# Patient Record
Sex: FEMALE | Race: WHITE | NOT HISPANIC OR LATINO | Employment: OTHER | URBAN - METROPOLITAN AREA
[De-identification: names, ages, dates, MRNs, and addresses within clinical notes are randomized per-mention and may not be internally consistent; named-entity substitution may affect disease eponyms.]

---

## 2017-01-17 ENCOUNTER — ALLSCRIPTS OFFICE VISIT (OUTPATIENT)
Dept: OTHER | Facility: OTHER | Age: 57
End: 2017-01-17

## 2017-01-17 ENCOUNTER — TRANSCRIBE ORDERS (OUTPATIENT)
Dept: ADMINISTRATIVE | Facility: HOSPITAL | Age: 57
End: 2017-01-17

## 2017-01-17 DIAGNOSIS — Z12.31 ENCOUNTER FOR SCREENING MAMMOGRAM FOR MALIGNANT NEOPLASM OF BREAST: ICD-10-CM

## 2017-01-17 DIAGNOSIS — Z12.39 OTHER SCREENING BREAST EXAMINATION: Primary | ICD-10-CM

## 2017-01-27 ENCOUNTER — GENERIC CONVERSION - ENCOUNTER (OUTPATIENT)
Dept: OTHER | Facility: OTHER | Age: 57
End: 2017-01-27

## 2017-01-30 ENCOUNTER — GENERIC CONVERSION - ENCOUNTER (OUTPATIENT)
Dept: OTHER | Facility: OTHER | Age: 57
End: 2017-01-30

## 2017-02-01 ENCOUNTER — ANESTHESIA EVENT (OUTPATIENT)
Dept: GASTROENTEROLOGY | Facility: HOSPITAL | Age: 57
End: 2017-02-01

## 2017-02-02 ENCOUNTER — ANESTHESIA (OUTPATIENT)
Dept: GASTROENTEROLOGY | Facility: HOSPITAL | Age: 57
End: 2017-02-02

## 2017-03-06 ENCOUNTER — ANESTHESIA EVENT (OUTPATIENT)
Dept: GASTROENTEROLOGY | Facility: HOSPITAL | Age: 57
End: 2017-03-06
Payer: COMMERCIAL

## 2017-03-07 ENCOUNTER — ANESTHESIA (OUTPATIENT)
Dept: GASTROENTEROLOGY | Facility: HOSPITAL | Age: 57
End: 2017-03-07
Payer: COMMERCIAL

## 2017-04-11 ENCOUNTER — HOSPITAL ENCOUNTER (OUTPATIENT)
Facility: HOSPITAL | Age: 57
Setting detail: OUTPATIENT SURGERY
Discharge: HOME | End: 2017-04-11
Attending: INTERNAL MEDICINE | Admitting: INTERNAL MEDICINE
Payer: COMMERCIAL

## 2017-04-11 VITALS
TEMPERATURE: 96.7 F | OXYGEN SATURATION: 98 % | WEIGHT: 220 LBS | HEIGHT: 62 IN | HEART RATE: 59 BPM | SYSTOLIC BLOOD PRESSURE: 125 MMHG | RESPIRATION RATE: 18 BRPM | DIASTOLIC BLOOD PRESSURE: 76 MMHG | BODY MASS INDEX: 40.48 KG/M2

## 2017-04-11 PROCEDURE — C1726 CATH, BAL DIL, NON-VASCULAR: HCPCS | Performed by: INTERNAL MEDICINE

## 2017-04-11 RX ORDER — IBUPROFEN 200 MG
200 TABLET ORAL EVERY 6 HOURS PRN
COMMUNITY
End: 2017-07-28 | Stop reason: ALTCHOICE

## 2017-04-11 RX ORDER — SODIUM CHLORIDE 9 MG/ML
125 INJECTION, SOLUTION INTRAVENOUS CONTINUOUS
Status: DISCONTINUED | OUTPATIENT
Start: 2017-04-11 | End: 2017-04-11 | Stop reason: HOSPADM

## 2017-04-11 RX ORDER — PROPOFOL 10 MG/ML
INJECTION, EMULSION INTRAVENOUS AS NEEDED
Status: DISCONTINUED | OUTPATIENT
Start: 2017-04-11 | End: 2017-04-11 | Stop reason: SURG

## 2017-04-11 RX ADMIN — PROPOFOL 120 MG: 10 INJECTION, EMULSION INTRAVENOUS at 14:04

## 2017-04-11 RX ADMIN — PROPOFOL 30 MG: 10 INJECTION, EMULSION INTRAVENOUS at 14:19

## 2017-04-11 RX ADMIN — PROPOFOL 40 MG: 10 INJECTION, EMULSION INTRAVENOUS at 14:12

## 2017-04-11 RX ADMIN — PROPOFOL 40 MG: 10 INJECTION, EMULSION INTRAVENOUS at 14:15

## 2017-04-11 RX ADMIN — SODIUM CHLORIDE 125 ML/HR: 0.9 INJECTION, SOLUTION INTRAVENOUS at 13:08

## 2017-04-19 ENCOUNTER — ALLSCRIPTS OFFICE VISIT (OUTPATIENT)
Dept: OTHER | Facility: OTHER | Age: 57
End: 2017-04-19

## 2017-04-19 DIAGNOSIS — M79.641 PAIN OF RIGHT HAND: ICD-10-CM

## 2017-04-19 DIAGNOSIS — D50.9 IRON DEFICIENCY ANEMIA: ICD-10-CM

## 2017-04-19 DIAGNOSIS — E03.9 HYPOTHYROIDISM: ICD-10-CM

## 2017-04-19 DIAGNOSIS — E78.5 HYPERLIPIDEMIA: ICD-10-CM

## 2017-05-17 ENCOUNTER — ALLSCRIPTS OFFICE VISIT (OUTPATIENT)
Dept: OTHER | Facility: OTHER | Age: 57
End: 2017-05-17

## 2017-06-05 ENCOUNTER — HOSPITAL ENCOUNTER (EMERGENCY)
Facility: HOSPITAL | Age: 57
Discharge: HOME | End: 2017-06-05
Attending: EMERGENCY MEDICINE | Admitting: EMERGENCY MEDICINE
Payer: COMMERCIAL

## 2017-06-05 ENCOUNTER — APPOINTMENT (EMERGENCY)
Dept: CT IMAGING | Facility: HOSPITAL | Age: 57
End: 2017-06-05
Payer: COMMERCIAL

## 2017-06-05 VITALS
RESPIRATION RATE: 18 BRPM | SYSTOLIC BLOOD PRESSURE: 122 MMHG | OXYGEN SATURATION: 97 % | HEART RATE: 64 BPM | DIASTOLIC BLOOD PRESSURE: 60 MMHG | TEMPERATURE: 97.8 F

## 2017-06-05 DIAGNOSIS — S23.9XXA THORACIC BACK SPRAIN, INITIAL ENCOUNTER: Primary | ICD-10-CM

## 2017-06-05 DIAGNOSIS — R10.9 RIGHT FLANK DISCOMFORT: ICD-10-CM

## 2017-06-05 LAB
ALBUMIN SERPL BCP-MCNC: 3.1 G/DL (ref 3.5–5)
ALP SERPL-CCNC: 98 U/L (ref 46–116)
ALT SERPL W P-5'-P-CCNC: 13 U/L (ref 12–78)
ANION GAP SERPL CALCULATED.3IONS-SCNC: 2 MMOL/L (ref 4–13)
AST SERPL W P-5'-P-CCNC: 11 U/L (ref 5–45)
BACTERIA UR QL AUTO: ABNORMAL /HPF
BASOPHILS # BLD AUTO: 0.04 THOUSANDS/ΜL (ref 0–0.1)
BASOPHILS NFR BLD AUTO: 0 % (ref 0–1)
BILIRUB SERPL-MCNC: 0.24 MG/DL (ref 0.2–1)
BILIRUB UR QL STRIP: NEGATIVE
BUN SERPL-MCNC: 18 MG/DL (ref 5–25)
CALCIUM SERPL-MCNC: 9 MG/DL (ref 8.3–10.1)
CHLORIDE SERPL-SCNC: 107 MMOL/L (ref 100–108)
CLARITY UR: CLEAR
CO2 SERPL-SCNC: 32 MMOL/L (ref 21–32)
COLOR UR: YELLOW
CREAT SERPL-MCNC: 0.94 MG/DL (ref 0.6–1.3)
EOSINOPHIL # BLD AUTO: 0.27 THOUSAND/ΜL (ref 0–0.61)
EOSINOPHIL NFR BLD AUTO: 3 % (ref 0–6)
ERYTHROCYTE [DISTWIDTH] IN BLOOD BY AUTOMATED COUNT: 13.6 % (ref 11.6–15.1)
GFR SERPL CREATININE-BSD FRML MDRD: >60 ML/MIN/1.73SQ M
GLUCOSE SERPL-MCNC: 97 MG/DL (ref 65–140)
GLUCOSE UR STRIP-MCNC: NEGATIVE MG/DL
HCT VFR BLD AUTO: 34.3 % (ref 34.8–46.1)
HGB BLD-MCNC: 11.2 G/DL (ref 11.5–15.4)
HGB UR QL STRIP.AUTO: NEGATIVE
KETONES UR STRIP-MCNC: NEGATIVE MG/DL
LEUKOCYTE ESTERASE UR QL STRIP: ABNORMAL
LYMPHOCYTES # BLD AUTO: 2.82 THOUSANDS/ΜL (ref 0.6–4.47)
LYMPHOCYTES NFR BLD AUTO: 31 % (ref 14–44)
MCH RBC QN AUTO: 28.6 PG (ref 26.8–34.3)
MCHC RBC AUTO-ENTMCNC: 32.7 G/DL (ref 31.4–37.4)
MCV RBC AUTO: 88 FL (ref 82–98)
MONOCYTES # BLD AUTO: 0.67 THOUSAND/ΜL (ref 0.17–1.22)
MONOCYTES NFR BLD AUTO: 8 % (ref 4–12)
NEUTROPHILS # BLD AUTO: 5.18 THOUSANDS/ΜL (ref 1.85–7.62)
NEUTS SEG NFR BLD AUTO: 58 % (ref 43–75)
NITRITE UR QL STRIP: NEGATIVE
NON-SQ EPI CELLS URNS QL MICRO: ABNORMAL /HPF
NRBC BLD AUTO-RTO: 0 /100 WBCS
PH UR STRIP.AUTO: 6 [PH] (ref 4.5–8)
PLATELET # BLD AUTO: 287 THOUSANDS/UL (ref 149–390)
PMV BLD AUTO: 9.2 FL (ref 8.9–12.7)
POTASSIUM SERPL-SCNC: 4.5 MMOL/L (ref 3.5–5.3)
PROT SERPL-MCNC: 6.7 G/DL (ref 6.4–8.2)
PROT UR STRIP-MCNC: NEGATIVE MG/DL
RBC # BLD AUTO: 3.91 MILLION/UL (ref 3.81–5.12)
RBC #/AREA URNS AUTO: ABNORMAL /HPF
SODIUM SERPL-SCNC: 141 MMOL/L (ref 136–145)
SP GR UR STRIP.AUTO: <=1.005 (ref 1–1.03)
UROBILINOGEN UR QL STRIP.AUTO: 0.2 E.U./DL
WBC # BLD AUTO: 8.98 THOUSAND/UL (ref 4.31–10.16)
WBC #/AREA URNS AUTO: ABNORMAL /HPF

## 2017-06-05 PROCEDURE — 80053 COMPREHEN METABOLIC PANEL: CPT | Performed by: EMERGENCY MEDICINE

## 2017-06-05 PROCEDURE — 81002 URINALYSIS NONAUTO W/O SCOPE: CPT | Performed by: EMERGENCY MEDICINE

## 2017-06-05 PROCEDURE — 99284 EMERGENCY DEPT VISIT MOD MDM: CPT

## 2017-06-05 PROCEDURE — 85025 COMPLETE CBC W/AUTO DIFF WBC: CPT | Performed by: EMERGENCY MEDICINE

## 2017-06-05 PROCEDURE — 36415 COLL VENOUS BLD VENIPUNCTURE: CPT | Performed by: EMERGENCY MEDICINE

## 2017-06-05 PROCEDURE — 81001 URINALYSIS AUTO W/SCOPE: CPT

## 2017-06-05 PROCEDURE — 74176 CT ABD & PELVIS W/O CONTRAST: CPT

## 2017-06-05 RX ORDER — LIDOCAINE 50 MG/G
1 PATCH TOPICAL EVERY 24 HOURS
Qty: 30 PATCH | Refills: 0 | Status: SHIPPED | OUTPATIENT
Start: 2017-06-05 | End: 2017-07-28 | Stop reason: ALTCHOICE

## 2017-06-05 RX ORDER — TRAMADOL HYDROCHLORIDE 50 MG/1
50 TABLET ORAL EVERY 6 HOURS PRN
Qty: 20 TABLET | Refills: 0 | Status: SHIPPED | OUTPATIENT
Start: 2017-06-05 | End: 2017-06-10

## 2017-06-05 RX ORDER — LIDOCAINE 50 MG/G
1 PATCH TOPICAL ONCE
Status: DISCONTINUED | OUTPATIENT
Start: 2017-06-05 | End: 2017-06-06 | Stop reason: HOSPADM

## 2017-06-05 RX ORDER — METHOCARBAMOL 500 MG/1
1000 TABLET, FILM COATED ORAL ONCE
Status: COMPLETED | OUTPATIENT
Start: 2017-06-05 | End: 2017-06-05

## 2017-06-05 RX ORDER — IBUPROFEN 600 MG/1
600 TABLET ORAL EVERY 8 HOURS PRN
Qty: 30 TABLET | Refills: 0 | Status: SHIPPED | OUTPATIENT
Start: 2017-06-05 | End: 2017-07-28 | Stop reason: ALTCHOICE

## 2017-06-05 RX ORDER — TRAMADOL HYDROCHLORIDE 50 MG/1
50 TABLET ORAL ONCE
Status: COMPLETED | OUTPATIENT
Start: 2017-06-05 | End: 2017-06-05

## 2017-06-05 RX ORDER — METHOCARBAMOL 500 MG/1
1000 TABLET, FILM COATED ORAL EVERY 8 HOURS PRN
Qty: 30 TABLET | Refills: 0 | Status: SHIPPED | OUTPATIENT
Start: 2017-06-05 | End: 2017-07-28 | Stop reason: ALTCHOICE

## 2017-06-05 RX ORDER — IBUPROFEN 600 MG/1
600 TABLET ORAL ONCE
Status: COMPLETED | OUTPATIENT
Start: 2017-06-05 | End: 2017-06-05

## 2017-06-05 RX ADMIN — METHOCARBAMOL 1000 MG: 500 TABLET ORAL at 22:34

## 2017-06-05 RX ADMIN — IBUPROFEN 600 MG: 600 TABLET, FILM COATED ORAL at 22:34

## 2017-06-05 RX ADMIN — TRAMADOL HYDROCHLORIDE 50 MG: 50 TABLET, COATED ORAL at 22:34

## 2017-06-05 RX ADMIN — LIDOCAINE 1 PATCH: 50 PATCH CUTANEOUS at 22:32

## 2017-06-29 ENCOUNTER — ALLSCRIPTS OFFICE VISIT (OUTPATIENT)
Dept: OTHER | Facility: OTHER | Age: 57
End: 2017-06-29

## 2017-07-10 ENCOUNTER — ALLSCRIPTS OFFICE VISIT (OUTPATIENT)
Dept: OTHER | Facility: OTHER | Age: 57
End: 2017-07-10

## 2017-07-10 DIAGNOSIS — R10.9 ABDOMINAL PAIN: ICD-10-CM

## 2017-07-10 DIAGNOSIS — R22.1 LOCALIZED SWELLING, MASS OR LUMP OF NECK: ICD-10-CM

## 2017-07-10 DIAGNOSIS — N12 TUBULO-INTERSTITIAL NEPHRITIS: ICD-10-CM

## 2017-07-11 ENCOUNTER — HOSPITAL ENCOUNTER (OUTPATIENT)
Dept: ULTRASOUND IMAGING | Facility: HOSPITAL | Age: 57
Discharge: HOME | End: 2017-07-11
Payer: COMMERCIAL

## 2017-07-11 DIAGNOSIS — R22.1 LOCALIZED SWELLING, MASS OR LUMP OF NECK: ICD-10-CM

## 2017-07-11 PROCEDURE — 76536 US EXAM OF HEAD AND NECK: CPT

## 2017-07-16 ENCOUNTER — HOSPITAL ENCOUNTER (EMERGENCY)
Facility: HOSPITAL | Age: 57
Discharge: HOME | End: 2017-07-16
Attending: EMERGENCY MEDICINE | Admitting: EMERGENCY MEDICINE
Payer: COMMERCIAL

## 2017-07-16 VITALS
HEART RATE: 99 BPM | RESPIRATION RATE: 16 BRPM | OXYGEN SATURATION: 94 % | DIASTOLIC BLOOD PRESSURE: 89 MMHG | SYSTOLIC BLOOD PRESSURE: 161 MMHG | TEMPERATURE: 97.3 F

## 2017-07-16 DIAGNOSIS — T78.3XXA ANGIOEDEMA, INITIAL ENCOUNTER: Primary | ICD-10-CM

## 2017-07-16 PROCEDURE — 99283 EMERGENCY DEPT VISIT LOW MDM: CPT

## 2017-07-16 PROCEDURE — 96374 THER/PROPH/DIAG INJ IV PUSH: CPT

## 2017-07-16 RX ORDER — ZIPRASIDONE MESYLATE 20 MG/ML
INJECTION, POWDER, LYOPHILIZED, FOR SOLUTION INTRAMUSCULAR
Status: DISCONTINUED
Start: 2017-07-16 | End: 2017-07-16 | Stop reason: WASHOUT

## 2017-07-16 RX ORDER — DIPHENHYDRAMINE HCL 25 MG
25 TABLET ORAL ONCE
Status: COMPLETED | OUTPATIENT
Start: 2017-07-16 | End: 2017-07-16

## 2017-07-16 RX ORDER — LORAZEPAM 2 MG/ML
INJECTION INTRAMUSCULAR
Status: DISCONTINUED
Start: 2017-07-16 | End: 2017-07-16 | Stop reason: WASHOUT

## 2017-07-16 RX ADMIN — DEXAMETHASONE SODIUM PHOSPHATE 10 MG: 10 INJECTION, SOLUTION INTRAMUSCULAR; INTRAVENOUS at 22:37

## 2017-07-16 RX ADMIN — DIPHENHYDRAMINE HCL 25 MG: 25 TABLET ORAL at 22:37

## 2017-07-26 ENCOUNTER — TRANSCRIBE ORDERS (OUTPATIENT)
Dept: ADMINISTRATIVE | Facility: HOSPITAL | Age: 57
End: 2017-07-26

## 2017-07-26 DIAGNOSIS — R22.1 MASS OF LEFT SIDE OF NECK: ICD-10-CM

## 2017-07-26 DIAGNOSIS — Z87.891 EX-SMOKER: Primary | ICD-10-CM

## 2017-07-28 ENCOUNTER — APPOINTMENT (EMERGENCY)
Dept: CT IMAGING | Facility: HOSPITAL | Age: 57
End: 2017-07-28
Payer: COMMERCIAL

## 2017-07-28 ENCOUNTER — HOSPITAL ENCOUNTER (OUTPATIENT)
Dept: CT IMAGING | Facility: HOSPITAL | Age: 57
Discharge: HOME | End: 2017-07-28
Payer: COMMERCIAL

## 2017-07-28 ENCOUNTER — HOSPITAL ENCOUNTER (EMERGENCY)
Facility: HOSPITAL | Age: 57
Discharge: HOME | End: 2017-07-28
Attending: EMERGENCY MEDICINE | Admitting: EMERGENCY MEDICINE
Payer: COMMERCIAL

## 2017-07-28 VITALS
WEIGHT: 210 LBS | RESPIRATION RATE: 18 BRPM | OXYGEN SATURATION: 96 % | HEART RATE: 69 BPM | BODY MASS INDEX: 38.41 KG/M2 | TEMPERATURE: 97.6 F | DIASTOLIC BLOOD PRESSURE: 72 MMHG | SYSTOLIC BLOOD PRESSURE: 145 MMHG

## 2017-07-28 DIAGNOSIS — R22.1 NECK MASS: ICD-10-CM

## 2017-07-28 DIAGNOSIS — R22.1 MASS OF LEFT SIDE OF NECK: ICD-10-CM

## 2017-07-28 DIAGNOSIS — N10 PYELONEPHRITIS, ACUTE: Primary | ICD-10-CM

## 2017-07-28 LAB
ANION GAP SERPL CALCULATED.3IONS-SCNC: 5 MMOL/L (ref 4–13)
BACTERIA UR QL AUTO: ABNORMAL /HPF
BASOPHILS # BLD AUTO: 0.03 THOUSANDS/ΜL (ref 0–0.1)
BASOPHILS NFR BLD AUTO: 0 % (ref 0–1)
BILIRUB UR QL STRIP: NEGATIVE
BUN SERPL-MCNC: 22 MG/DL (ref 5–25)
CALCIUM SERPL-MCNC: 9.1 MG/DL (ref 8.3–10.1)
CHLORIDE SERPL-SCNC: 105 MMOL/L (ref 100–108)
CLARITY UR: ABNORMAL
CO2 SERPL-SCNC: 29 MMOL/L (ref 21–32)
COLOR UR: YELLOW
CREAT SERPL-MCNC: 1.05 MG/DL (ref 0.6–1.3)
EOSINOPHIL # BLD AUTO: 0.26 THOUSAND/ΜL (ref 0–0.61)
EOSINOPHIL NFR BLD AUTO: 3 % (ref 0–6)
ERYTHROCYTE [DISTWIDTH] IN BLOOD BY AUTOMATED COUNT: 13.3 % (ref 11.6–15.1)
GFR SERPL CREATININE-BSD FRML MDRD: 58 ML/MIN/1.73SQ M
GLUCOSE SERPL-MCNC: 84 MG/DL (ref 65–140)
GLUCOSE UR STRIP-MCNC: NEGATIVE MG/DL
HCT VFR BLD AUTO: 38.8 % (ref 34.8–46.1)
HGB BLD-MCNC: 12.9 G/DL (ref 11.5–15.4)
HGB UR QL STRIP.AUTO: NEGATIVE
KETONES UR STRIP-MCNC: NEGATIVE MG/DL
LEUKOCYTE ESTERASE UR QL STRIP: ABNORMAL
LYMPHOCYTES # BLD AUTO: 1.88 THOUSANDS/ΜL (ref 0.6–4.47)
LYMPHOCYTES NFR BLD AUTO: 23 % (ref 14–44)
MCH RBC QN AUTO: 29.4 PG (ref 26.8–34.3)
MCHC RBC AUTO-ENTMCNC: 33.2 G/DL (ref 31.4–37.4)
MCV RBC AUTO: 88 FL (ref 82–98)
MONOCYTES # BLD AUTO: 0.59 THOUSAND/ΜL (ref 0.17–1.22)
MONOCYTES NFR BLD AUTO: 7 % (ref 4–12)
NEUTROPHILS # BLD AUTO: 5.31 THOUSANDS/ΜL (ref 1.85–7.62)
NEUTS SEG NFR BLD AUTO: 67 % (ref 43–75)
NITRITE UR QL STRIP: POSITIVE
NON-SQ EPI CELLS URNS QL MICRO: ABNORMAL /HPF
NRBC BLD AUTO-RTO: 0 /100 WBCS
PH UR STRIP.AUTO: 5.5 [PH] (ref 4.5–8)
PLATELET # BLD AUTO: 290 THOUSANDS/UL (ref 149–390)
PMV BLD AUTO: 9.3 FL (ref 8.9–12.7)
POTASSIUM SERPL-SCNC: 4.5 MMOL/L (ref 3.5–5.3)
PROT UR STRIP-MCNC: NEGATIVE MG/DL
RBC # BLD AUTO: 4.39 MILLION/UL (ref 3.81–5.12)
RBC #/AREA URNS AUTO: ABNORMAL /HPF
SODIUM SERPL-SCNC: 139 MMOL/L (ref 136–145)
SP GR UR STRIP.AUTO: 1.02 (ref 1–1.03)
UROBILINOGEN UR QL STRIP.AUTO: 0.2 E.U./DL
WBC # BLD AUTO: 8.07 THOUSAND/UL (ref 4.31–10.16)
WBC #/AREA URNS AUTO: ABNORMAL /HPF

## 2017-07-28 PROCEDURE — 85025 COMPLETE CBC W/AUTO DIFF WBC: CPT | Performed by: EMERGENCY MEDICINE

## 2017-07-28 PROCEDURE — 36415 COLL VENOUS BLD VENIPUNCTURE: CPT | Performed by: EMERGENCY MEDICINE

## 2017-07-28 PROCEDURE — 87077 CULTURE AEROBIC IDENTIFY: CPT

## 2017-07-28 PROCEDURE — 96361 HYDRATE IV INFUSION ADD-ON: CPT

## 2017-07-28 PROCEDURE — 81002 URINALYSIS NONAUTO W/O SCOPE: CPT | Performed by: EMERGENCY MEDICINE

## 2017-07-28 PROCEDURE — 70490 CT SOFT TISSUE NECK W/O DYE: CPT

## 2017-07-28 PROCEDURE — 81001 URINALYSIS AUTO W/SCOPE: CPT

## 2017-07-28 PROCEDURE — 74176 CT ABD & PELVIS W/O CONTRAST: CPT

## 2017-07-28 PROCEDURE — 87186 SC STD MICRODIL/AGAR DIL: CPT

## 2017-07-28 PROCEDURE — 71250 CT THORAX DX C-: CPT

## 2017-07-28 PROCEDURE — 87086 URINE CULTURE/COLONY COUNT: CPT

## 2017-07-28 PROCEDURE — 96374 THER/PROPH/DIAG INJ IV PUSH: CPT

## 2017-07-28 PROCEDURE — 99284 EMERGENCY DEPT VISIT MOD MDM: CPT

## 2017-07-28 PROCEDURE — 96375 TX/PRO/DX INJ NEW DRUG ADDON: CPT

## 2017-07-28 PROCEDURE — 80048 BASIC METABOLIC PNL TOTAL CA: CPT | Performed by: EMERGENCY MEDICINE

## 2017-07-28 RX ORDER — KETOROLAC TROMETHAMINE 30 MG/ML
15 INJECTION, SOLUTION INTRAMUSCULAR; INTRAVENOUS ONCE
Status: COMPLETED | OUTPATIENT
Start: 2017-07-28 | End: 2017-07-28

## 2017-07-28 RX ORDER — CIPROFLOXACIN 500 MG/1
500 TABLET, FILM COATED ORAL EVERY 12 HOURS
Qty: 20 TABLET | Refills: 0 | Status: SHIPPED | OUTPATIENT
Start: 2017-07-28 | End: 2017-08-07

## 2017-07-28 RX ORDER — MORPHINE SULFATE 4 MG/ML
4 INJECTION, SOLUTION INTRAMUSCULAR; INTRAVENOUS ONCE
Status: COMPLETED | OUTPATIENT
Start: 2017-07-28 | End: 2017-07-28

## 2017-07-28 RX ORDER — TRAMADOL HYDROCHLORIDE 50 MG/1
50 TABLET ORAL EVERY 6 HOURS PRN
Qty: 15 TABLET | Refills: 0 | Status: SHIPPED | OUTPATIENT
Start: 2017-07-28 | End: 2017-09-15 | Stop reason: ALTCHOICE

## 2017-07-28 RX ADMIN — SODIUM CHLORIDE 1000 ML: 0.9 INJECTION, SOLUTION INTRAVENOUS at 16:03

## 2017-07-28 RX ADMIN — MORPHINE SULFATE 4 MG: 4 INJECTION, SOLUTION INTRAMUSCULAR; INTRAVENOUS at 16:01

## 2017-07-28 RX ADMIN — KETOROLAC TROMETHAMINE 15 MG: 30 INJECTION, SOLUTION INTRAMUSCULAR at 16:02

## 2017-07-30 LAB
BACTERIA UR CULT: NORMAL
BACTERIA UR CULT: NORMAL

## 2017-07-31 ENCOUNTER — GENERIC CONVERSION - ENCOUNTER (OUTPATIENT)
Dept: OTHER | Facility: OTHER | Age: 57
End: 2017-07-31

## 2017-08-04 ENCOUNTER — GENERIC CONVERSION - ENCOUNTER (OUTPATIENT)
Dept: OTHER | Facility: OTHER | Age: 57
End: 2017-08-04

## 2017-08-04 ENCOUNTER — ALLSCRIPTS OFFICE VISIT (OUTPATIENT)
Dept: OTHER | Facility: OTHER | Age: 57
End: 2017-08-04

## 2017-08-04 ENCOUNTER — APPOINTMENT (OUTPATIENT)
Dept: LAB | Facility: HOSPITAL | Age: 57
End: 2017-08-04
Payer: COMMERCIAL

## 2017-08-04 DIAGNOSIS — N12 TUBULO-INTERSTITIAL NEPHRITIS: ICD-10-CM

## 2017-08-04 LAB
BILIRUB UR QL STRIP: NORMAL
CLARITY UR: NORMAL
COLOR UR: YELLOW
GLUCOSE (HISTORICAL): NORMAL
HGB UR QL STRIP.AUTO: NORMAL
KETONES UR STRIP-MCNC: NORMAL MG/DL
LEUKOCYTE ESTERASE UR QL STRIP: NORMAL
NITRITE UR QL STRIP: NORMAL
PH UR STRIP.AUTO: 5 [PH]
PROT UR STRIP-MCNC: 15 MG/DL
SP GR UR STRIP.AUTO: 1.03
UROBILINOGEN UR QL STRIP.AUTO: 0.2

## 2017-08-04 PROCEDURE — 87147 CULTURE TYPE IMMUNOLOGIC: CPT

## 2017-08-04 PROCEDURE — 87186 SC STD MICRODIL/AGAR DIL: CPT

## 2017-08-04 PROCEDURE — 87086 URINE CULTURE/COLONY COUNT: CPT

## 2017-08-05 ENCOUNTER — HOSPITAL ENCOUNTER (OUTPATIENT)
Dept: ULTRASOUND IMAGING | Facility: HOSPITAL | Age: 57
Discharge: HOME | End: 2017-08-05
Payer: COMMERCIAL

## 2017-08-05 DIAGNOSIS — R10.9 ABDOMINAL PAIN: ICD-10-CM

## 2017-08-05 PROCEDURE — 76700 US EXAM ABDOM COMPLETE: CPT

## 2017-08-06 LAB
BACTERIA UR CULT: NORMAL
BACTERIA UR CULT: NORMAL

## 2017-08-07 ENCOUNTER — GENERIC CONVERSION - ENCOUNTER (OUTPATIENT)
Dept: OTHER | Facility: OTHER | Age: 57
End: 2017-08-07

## 2017-08-09 ENCOUNTER — GENERIC CONVERSION - ENCOUNTER (OUTPATIENT)
Dept: OTHER | Facility: OTHER | Age: 57
End: 2017-08-09

## 2017-08-15 ENCOUNTER — ALLSCRIPTS OFFICE VISIT (OUTPATIENT)
Dept: OTHER | Facility: OTHER | Age: 57
End: 2017-08-15

## 2017-08-30 ENCOUNTER — GENERIC CONVERSION - ENCOUNTER (OUTPATIENT)
Dept: OTHER | Facility: OTHER | Age: 57
End: 2017-08-30

## 2017-09-15 ENCOUNTER — HOSPITAL ENCOUNTER (EMERGENCY)
Facility: HOSPITAL | Age: 57
Discharge: HOME | End: 2017-09-15
Admitting: EMERGENCY MEDICINE
Payer: COMMERCIAL

## 2017-09-15 ENCOUNTER — APPOINTMENT (EMERGENCY)
Dept: RADIOLOGY | Facility: HOSPITAL | Age: 57
End: 2017-09-15
Payer: COMMERCIAL

## 2017-09-15 ENCOUNTER — APPOINTMENT (EMERGENCY)
Dept: CT IMAGING | Facility: HOSPITAL | Age: 57
End: 2017-09-15
Payer: COMMERCIAL

## 2017-09-15 VITALS
DIASTOLIC BLOOD PRESSURE: 61 MMHG | SYSTOLIC BLOOD PRESSURE: 128 MMHG | WEIGHT: 223 LBS | OXYGEN SATURATION: 98 % | BODY MASS INDEX: 40.79 KG/M2 | TEMPERATURE: 97.7 F | RESPIRATION RATE: 16 BRPM | HEART RATE: 81 BPM

## 2017-09-15 DIAGNOSIS — M25.561 BILATERAL KNEE PAIN: ICD-10-CM

## 2017-09-15 DIAGNOSIS — S92.251A: ICD-10-CM

## 2017-09-15 DIAGNOSIS — E78.5 HYPERLIPIDEMIA: ICD-10-CM

## 2017-09-15 DIAGNOSIS — S93.401A RIGHT ANKLE SPRAIN: ICD-10-CM

## 2017-09-15 DIAGNOSIS — M25.562 BILATERAL KNEE PAIN: ICD-10-CM
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
## 2017-09-15 DIAGNOSIS — S00.83XA TRAUMATIC HEMATOMA OF FOREHEAD, INITIAL ENCOUNTER: ICD-10-CM

## 2017-09-15 DIAGNOSIS — S63.501A RIGHT WRIST SPRAIN, INITIAL ENCOUNTER: ICD-10-CM

## 2017-09-15 PROCEDURE — 99284 EMERGENCY DEPT VISIT MOD MDM: CPT

## 2017-09-15 PROCEDURE — 73564 X-RAY EXAM KNEE 4 OR MORE: CPT

## 2017-09-15 PROCEDURE — 70450 CT HEAD/BRAIN W/O DYE: CPT

## 2017-09-15 PROCEDURE — 73110 X-RAY EXAM OF WRIST: CPT

## 2017-09-15 PROCEDURE — 73610 X-RAY EXAM OF ANKLE: CPT

## 2017-09-15 RX ORDER — HYDROCODONE BITARTRATE AND ACETAMINOPHEN 5; 325 MG/1; MG/1
1 TABLET ORAL EVERY 4 HOURS PRN
Qty: 8 TABLET | Refills: 0 | Status: SHIPPED | OUTPATIENT
Start: 2017-09-15 | End: 2017-09-25

## 2017-09-15 RX ORDER — HYDROCODONE BITARTRATE AND ACETAMINOPHEN 5; 325 MG/1; MG/1
1 TABLET ORAL EVERY 4 HOURS PRN
Qty: 8 TABLET | Refills: 0 | Status: SHIPPED | OUTPATIENT
Start: 2017-09-15 | End: 2017-09-15 | Stop reason: CLARIF

## 2017-09-15 RX ORDER — HYDROCODONE BITARTRATE AND ACETAMINOPHEN 5; 325 MG/1; MG/1
1 TABLET ORAL ONCE
Status: COMPLETED | OUTPATIENT
Start: 2017-09-15 | End: 2017-09-15

## 2017-09-15 RX ADMIN — HYDROCODONE BITARTRATE AND ACETAMINOPHEN 1 TABLET: 5; 325 TABLET ORAL at 13:59

## 2017-09-26 ENCOUNTER — GENERIC CONVERSION - ENCOUNTER (OUTPATIENT)
Dept: OTHER | Facility: OTHER | Age: 57
End: 2017-09-26

## 2017-11-29 ENCOUNTER — HOSPITAL ENCOUNTER (OUTPATIENT)
Dept: MAMMOGRAPHY | Facility: MEDICAL CENTER | Age: 57
Discharge: HOME | End: 2017-11-29
Payer: COMMERCIAL

## 2017-11-29 DIAGNOSIS — Z12.31 ENCOUNTER FOR SCREENING MAMMOGRAM FOR MALIGNANT NEOPLASM OF BREAST: ICD-10-CM

## 2017-11-29 PROCEDURE — G0202 SCR MAMMO BI INCL CAD: HCPCS

## 2017-12-08 ENCOUNTER — ALLSCRIPTS OFFICE VISIT (OUTPATIENT)
Dept: OTHER | Facility: OTHER | Age: 57
End: 2017-12-08

## 2017-12-08 DIAGNOSIS — N60.19 DIFFUSE CYSTIC MASTOPATHY OF BREAST: ICD-10-CM

## 2017-12-08 DIAGNOSIS — E03.9 HYPOTHYROIDISM: ICD-10-CM

## 2017-12-08 DIAGNOSIS — E78.5 HYPERLIPIDEMIA: ICD-10-CM

## 2017-12-08 DIAGNOSIS — D50.9 IRON DEFICIENCY ANEMIA: ICD-10-CM

## 2018-01-10 NOTE — RESULT NOTES
Verified Results  * US ABDOMEN COMPLETE [id number] 11:18AM Yaw Mir Order Number: [id number]    - Patient Instructions: To schedule this appointment, please contact Central Scheduling at [phone]  Test Name Result Flag Reference   US ABDOMEN COMPLETE (Report)     ABDOMEN ULTRASOUND, COMPLETE     INDICATION: Right flank pain for a few weeks, history of cholecystectomy  COMPARISON: CT abdomen and pelvis 7/28/2017     TECHNIQUE:  Real-time ultrasound of the abdomen was performed with a curvilinear transducer with both volumetric sweeps and still imaging techniques  FINDINGS:     PANCREAS: Visualized portions of the pancreas are within normal limits  AORTA AND IVC: Visualized portions are normal for patient age  LIVER:   Size: Within normal range  The liver measures 14 6 cm in the midclavicular line  Contour: Surface contour is smooth  Parenchyma: Echogenicity and echotexture are within normal limits  No evidence of suspicious mass  The main portal vein is patent and hepatopetal       BILIARY:   The gallbladder is surgically absent  No intrahepatic biliary dilatation  CBD measures 5 mm  No choledocholithiasis  KIDNEY:    Right kidney measures 10 2 x 3 1 cm  Within normal limits  Left kidney measures 11 0 x 5 2 cm  Within normal limits  SPLEEN:    Measures 9 7 cm  Within normal limits  ASCITES: None  IMPRESSION:     Unremarkable abdominal ultrasound status post cholecystectomy         Workstation performed: [id number]     Signed by:   Ulises Rudolph DO   8/9/17

## 2018-01-11 NOTE — RESULT NOTES
Verified Results  Urine Dip Non-Automated- POC [id number] 01:34PM Princess Scherer     Test Name Result Flag Reference   Color Yellow     Clarity Transparent     Leukocytes neg     Nitrite neg     Blood neg     Bilirubin neg     Urobilinogen 0 2     Protein 15     Ph 5     Specific Gravity 1 030     Ketone neg     Glucose neg     Color Yellow     Clarity Transparent     Leukocytes neg     Nitrite neg     Blood neg     Bilirubin neg     Urobilinogen 0 2     Protein 15     Ph 5     Specific Gravity 1 030     Ketone neg     Glucose neg

## 2018-01-11 NOTE — RESULT NOTES
Verified Results  (1) THIN PREP PAP FOLLOW UP WITH IMAGING [id number] 11:07AM Ant Juarez     Test Name Result Flag Reference   LAB AP CASE REPORT (Report)     Gynecologic Cytology Report            Case: [id number]                  Authorizing Provider: Fleurette Severin, MD      Collected:      03/24/2016 1107        First Screen:     CARSON Seo    Received:      03/29/2016 1107        Specimen:  LIQUID-BASED PAP, DIAGNOSTIC, Vaginal   LAB AP GYN PRIMARY INTERPRETATION      Negative for intraepithelial lesion or malignancy   LAB AP GYN SPECIMEN ADEQUACY      Satisfactory for evaluation  LAB AP GYN ADDITIONAL INFORMATION (Report)     [employer]'s FDA approved ,  and ThinPrep Imaging System are   utilized with strict adherence to the 's instruction manual to   prepare gynecologic and non-gynecologic cytology specimens for the   production of ThinPrep slides as well as for gynecologic ThinPrep imaging  These processes have been validated by our laboratory and/or by the     The Pap test is not a diagnostic procedure and should not be used as the   sole means to detect cervical cancer  It is only a screening procedure to   aid in the detection of cervical cancer and its precursors  Both   false-negative and false-positive results have been experienced  Your   patient's test result should be interpreted in this context together with   the history and clinical findings     LAB AP LMP years     years

## 2018-01-12 VITALS
WEIGHT: 238 LBS | HEIGHT: 64 IN | DIASTOLIC BLOOD PRESSURE: 72 MMHG | BODY MASS INDEX: 40.63 KG/M2 | SYSTOLIC BLOOD PRESSURE: 120 MMHG | HEART RATE: 70 BPM

## 2018-01-12 NOTE — MISCELLANEOUS
Message  left message to call back for 2nd po missed      Active Problems    1  Anxiety (300 00) (F41 9)   2  Asthma (493 90) (J45 909)   3  Attention deficit disorder (ADD) (314 00) (F90 0)   4  Benign essential hypertension (401 1) (I10)   5  Bipolar Disorder (296 00)   6  Breast cancer genetic susceptibility (V84 01) (Z15 01)   7  Depression (311) (F32 9)   8  Encounter for screening mammogram for breast cancer (V76 12) (Z12 31)   9  Family History (V19 8)   10  Ganglion of hand (727 43) (M67 449)   11  Hammer toe, acquired, right (735 4) (M20 41)   12  Hyperlipidemia (272 4) (E78 5)   13  Hypothyroidism (244 9) (E03 9)   14  Iron deficiency anemia (280 9) (D50 9)   15  Leg cramps (729 82) (R25 2)   16  Lipoma of skin and subcutaneous tissue (214 1) (D17 30)   17  Morbid obesity (278 01) (E66 01)   18  Obesity (278 00) (E66 9)   19  Obstructive sleep apnea (327 23) (G47 33)   20  Paraesophageal hiatal hernia (553 3) (K44 9)   21  Postgastrectomy malabsorption (579 3) (K91 2,Z90 3)   22  Pre-operative cardiovascular examination (V72 81) (Z01 810)   23  Shortness of breath (786 05) (R06 02)   24  Sleep apnea (780 57) (G47 30)   25  Status post gastric bypass for obesity (V45 86) (Z98 84)    Current Meds   1  Amphetamine-Dextroamphetamine 30 MG Oral Tablet (Adderall); 1 PO BID; Therapy: 48LSG0596 to (Evaluate:23Mar2016); Last Rx:48Vzp4290 Ordered   2  Calcium Citrate + D3 TABS Recorded   3  ClonazePAM 2 MG Oral Tablet; 1 PO QD; Therapy: 08FZB9311 to (Evaluate:27Mar2016); Last Rx:59Vdn5698 Ordered   4  FLUoxetine HCl - 40 MG Oral Capsule; TAKE 2 CAPSULES BY MOUTH DAILY; Therapy: 08HNJ6203 to (Evaluate:02Wtd0142)  Requested for: 42JXT2192; Last   Rx:19Jun2015 Ordered   5  Levothyroxine Sodium 100 MCG Oral Tablet; Take 1 tablet daily  Requested for:   28Dec2015; Last Rx:28Dec2015 Ordered   6  Lisinopril 10 MG Oral Tablet; TAKE ONE TABLET BY MOUTH ONCE DAILY;    Therapy: 53BWB8011 to  Requested for: 83Vft7298; Last   Rx:46Wqi5042 Ordered   7  LORazepam 2 MG Oral Tablet; 1 po QID; Therapy: 91XXF6888 to (Nguyen Thompson)  Requested for: 15LEW0386; Last   Rx:62Pvm9642 Ordered   8  Montelukast Sodium 10 MG Oral Tablet; take 1 tablet by mouth daily; Therapy: 66VNA3485 to (Evaluate:27Cov2296)  Requested for: 04QIE3083; Last   Rx:08Mar2016 Ordered   9  Multivitamins/Iron FC TABS Recorded   10  Omeprazole 20 MG Oral Capsule Delayed Release; TAKE 1 CAPSULE DAILY; Therapy: 93Ddt2765 to (Evaluate:98Ehv2485)  Requested for: 33SHN3203; Last    Rx:62Wjl7011 Ordered   11  QUEtiapine Fumarate 100 MG Oral Tablet (SEROquel); Take one at bedtime; Therapy: 97VWM5493 to (Evaluate:13Mvb2409)  Requested for: 71WRH6874; Last    Rx:18Jan2016 Ordered   12  QUEtiapine Fumarate 50 MG Oral Tablet; TAKE 1 TABLET AT BEDTIME; Therapy: 30LXW5587 to (Evaluate:88Tia9986)  Requested for: 18RFX6344; Last    Rx:18Jan2016 Ordered   13  Singulair 10 MG Oral Tablet (Montelukast Sodium); TAKE 1 TABLET DAILY; Therapy: 77EUQ9405 to (Evaluate:18Jan2016)  Requested for: 49Aan6021; Last    Rx:59Mxx4752 Ordered   14  Ventolin  (90 Base) MCG/ACT Inhalation Aerosol Solution; INHALE 1 TO 2    PUFFS EVERY 4 TO 6 HOURS AS NEEDED; Therapy: 67IWN9748 to (Evaluate:28Jun2015)  Requested for: 86KAM1100; Last    Rx:00Lab0542 Ordered    Allergies    1   Viibryd TABS    Signatures   Electronically signed by : Stephany Masterson, ; Mar 17 2016 11:25AM EST                       (Author)

## 2018-01-12 NOTE — MISCELLANEOUS
Provider Comments  Provider Comments:   Patient was a no show for today's new patient appointment  Called patient regarding missed appointment, patient states she forgot  Family had the stomach bug and with Vinny she forgot  Appointment was rescheduled        Signatures   Electronically signed by : Silvestre Forbes, ; Dec 28 2016 12:28PM EST                       (Author)

## 2018-01-12 NOTE — MISCELLANEOUS
Message   Recorded as Task   Date: 01/30/2017 11:07 AM, Created By: Chandrakant Mendez   Task Name: Follow Up   Assigned To: Jina Earl   Regarding Patient: Juan Francisco Saunders, Status: Active   Comment:    Yulissa Webber - 30 Jan 2017 11:07 AM     TASK CREATED  Please call patient to schedule follow up appointment, thanks  Per assigned task, I left message for Adolfo Varela patient about scheduling an appointment at our office since patient is overdue for a follow up  Active Problems    1  Anxiety (300 00) (F41 9)   2  Attention deficit disorder (ADD) (314 00) (F98 8)   3  Benign essential hypertension (401 1) (I10)   4  Bipolar Disorder (296 00)   5  Closed fracture of fourth toe of left foot with routine healing (V54 19) (S92 502D)   6  Depression (311) (F32 9)   7  Encounter for breast cancer screening other than mammogram (V76 10) (Z12 39)   8  Encounter for colorectal cancer screening (V76 51) (Z12 11,Z12 12)   9  Encounter for gynecological examination with abnormal finding (V72 31) (Z01 411)   10  Encounter for screening mammogram for breast cancer (V76 12) (Z12 31)   11  Fibrocystic breast disease (610 1) (N60 19)   12  Flu vaccine need (V04 81) (Z23)   13  Ganglion of hand (727 43) (M67 449)   14  Groin discomfort, left (789 09) (R10 32)   15  Hammer toe, acquired, right (735 4) (M20 41)   16  Hand joint pain, right (719 44) (M79 641)   17  Hyperlipidemia (272 4) (E78 5)   18  Hypothyroidism (244 9) (E03 9)   19  Iron deficiency anemia (280 9) (D50 9)   20  Leg cramps (729 82) (R25 2)   21  Lipoma of skin and subcutaneous tissue (214 1) (D17 30)   22  Mild intermittent asthma without complication (844 71) (K58 39)   23  Morbid obesity (278 01) (E66 01)   24  Nausea and vomiting (787 01) (R11 2)   25  Obesity (278 00) (E66 9)   26  Obstructive sleep apnea (327 23) (G47 33)   27  Pain of toe of left foot (729 5) (M79 675)   28  Paraesophageal hiatal hernia (553 3) (K44 9)   29   Pelvic relaxation (138 89) (N81 89) 30  Postgastrectomy malabsorption (579 3) (K91 2,Z90 3)   31  Pre-operative cardiovascular examination (V72 81) (Z01 810)   32  Sciatica of right side (724 3) (M54 31)   33  Shortness of breath (786 05) (R06 02)   34  Sleep apnea (780 57) (G47 30)   35  Status post gastric bypass for obesity (V45 86) (Z98 84)   36  Vaginal atrophy (627 3) (N95 2)    Current Meds   1  Amphetamine-Dextroamphetamine 30 MG Oral Tablet (Adderall); 1 PO BID; Therapy: 76LLO9242 to (Evaluate:82Kxb1137); Last Rx:24Jan2017 Ordered   2  Calcium Citrate + D3 TABS Recorded   3  ClonazePAM 2 MG Oral Tablet; take 1 tablet by mouth every day; Therapy: 56KTD8864 to (Evaluate:18Feb2017)  Requested for: 57Tku9479; Last   Rx:44Zzj4082 Ordered   4  FLUoxetine HCl - 20 MG Oral Capsule; TAKE 2 CAPSULES BY MOUTH DAILY; Therapy: 55Zka1953 to (Evaluate:25Jun2017)  Requested for: 45Fyt0733; Last   Rx:73Xvr3110 Ordered   5  FLUoxetine HCl - 20 MG Oral Tablet; TAKE 2 TABLETS DAILY; Therapy: 57FET4099 to (Evaluate:25Jun2017)  Requested for: 15Veu0389; Last   Rx:42Fnm1754 Ordered   6  Levothyroxine Sodium 100 MCG Oral Tablet; TAKE ONE TABLET BY MOUTH ONCE   DAILY; Therapy: 44VDA1852 to (Evaluate:26Jun2017)  Requested for: 28XKG9652; Last   Rx:44Cud6938 Ordered   7  Lisinopril 10 MG Oral Tablet; TAKE ONE TABLET BY MOUTH ONCE DAILY; Therapy: 60CQT5442 to (Evaluate:77Ghq6875)  Requested for: 69Goi0440; Last   Rx:07Dxw7084 Ordered   8  LORazepam 2 MG Oral Tablet; 1 po QID; Therapy: 72BVU5876 to (Evaluate:25Mar2017)  Requested for: 76YRK2078; Last   Rx:02Ypo5125 Ordered   9  Montelukast Sodium 10 MG Oral Tablet; take 1 tablet by mouth daily; Therapy: 09XMM1171 to (Silke Nehal)  Requested for: 43KFE9578; Last   Rx:05Oct2016 Ordered   10  Multivitamins/Iron FC TABS Recorded   11  Orphenadrine Citrate  MG Oral Tablet Extended Release 12 Hour; TAKE 1    TABLET TWICE DAILY;     Therapy: 20Apr2016 to (Evaluate:39Eav2178)  Requested for: 20Apr2016; Last    Rx:20Apr2016 Ordered   12  QUEtiapine Fumarate 100 MG Oral Tablet (SEROquel); Take one at bedtime; Therapy: 84ESK7580 to (Reyes Alvarez)  Requested for: 69FHL6293; Last    Rx:27Jan2017 Ordered   13  QUEtiapine Fumarate 50 MG Oral Tablet; Take 1 tablet by mouth at bedtime; Therapy: 37DZM0416 to (Evaluate:86Yds8488)  Requested for: 11AOO9319; Last    Rx:29Jun2016 Ordered   14  Singulair 10 MG Oral Tablet (Montelukast Sodium); TAKE 1 TABLET DAILY; Therapy: 30SEN5149 to (Evaluate:18Jan2016)  Requested for: 57Fxv7514; Last    Rx:74Fik1200 Ordered    Allergies    1  Augmentin   2   Viibryd TABS    Signatures   Electronically signed by : Queen Primitivo, ; Jan 30 2017 12:58PM EST                       (Author)

## 2018-01-13 VITALS
HEART RATE: 72 BPM | OXYGEN SATURATION: 97 % | SYSTOLIC BLOOD PRESSURE: 112 MMHG | WEIGHT: 236.78 LBS | BODY MASS INDEX: 40.42 KG/M2 | TEMPERATURE: 97.5 F | HEIGHT: 64 IN | DIASTOLIC BLOOD PRESSURE: 86 MMHG | RESPIRATION RATE: 15 BRPM

## 2018-01-13 VITALS
BODY MASS INDEX: 40.36 KG/M2 | SYSTOLIC BLOOD PRESSURE: 154 MMHG | DIASTOLIC BLOOD PRESSURE: 98 MMHG | WEIGHT: 236.38 LBS | HEART RATE: 87 BPM | OXYGEN SATURATION: 97 % | TEMPERATURE: 97.4 F | RESPIRATION RATE: 15 BRPM | HEIGHT: 64 IN

## 2018-01-13 VITALS
WEIGHT: 237 LBS | BODY MASS INDEX: 40.46 KG/M2 | TEMPERATURE: 97.6 F | HEART RATE: 76 BPM | SYSTOLIC BLOOD PRESSURE: 124 MMHG | DIASTOLIC BLOOD PRESSURE: 80 MMHG | HEIGHT: 64 IN

## 2018-01-13 NOTE — MISCELLANEOUS
Provider Comments  Provider Comments:   Pt was a no show for today's visit       [address] 8/15/2017      Signatures   Electronically signed by : ERNST Driscoll ; Aug 15 2017  4:23PM EST                       (Author)

## 2018-01-13 NOTE — MISCELLANEOUS
Provider Comments  Provider Comments:     Dear Jori Macias had a scheduled appointment at our office today but were unable to keep  We attempted to call you back but were unable to reach you  It is very important that you follow up with us so that we can assess your physical and nutritional safety after your surgery  Please call our office at [phone] to reschedule your appointment       Sincerely,     Lissette Gleason [facility]            Signatures   Electronically signed by : ERNST Adair ; Apr 14 2016 12:24PM EST                       (Validation)

## 2018-01-14 VITALS
HEART RATE: 70 BPM | RESPIRATION RATE: 16 BRPM | BODY MASS INDEX: 39.23 KG/M2 | WEIGHT: 229.8 LBS | DIASTOLIC BLOOD PRESSURE: 100 MMHG | HEIGHT: 64 IN | TEMPERATURE: 97.1 F | SYSTOLIC BLOOD PRESSURE: 130 MMHG

## 2018-01-14 VITALS
DIASTOLIC BLOOD PRESSURE: 82 MMHG | WEIGHT: 228.38 LBS | RESPIRATION RATE: 18 BRPM | TEMPERATURE: 97.5 F | OXYGEN SATURATION: 96 % | HEIGHT: 64 IN | HEART RATE: 86 BPM | BODY MASS INDEX: 38.99 KG/M2 | SYSTOLIC BLOOD PRESSURE: 128 MMHG

## 2018-01-14 NOTE — RESULT NOTES
Verified Results  (1) URINE CULTURE [id number] 05:00PM Ganesh Arroyo Order Number: [id number]     Test Name Result Flag Reference   CLINICAL REPORT (Report)     Test:        Urine culture  Specimen Type:   Urine  Specimen Date:   8/4/2017 5:00 PM  Result Date:    8/6/2017 5:59 PM  Result Status:   Final result  Resulting Lab:   [address] [address]            Tel: [phone]      CULTURE                                       ------------------                                   40,000-49,000 cfu/ml Staphylococcus coagulase negative    10,000-19,000 cfu/ml Mixed Contaminants X3      SUSCEPTIBILITY                                   ------------------                                                       Staphylococcus coagulase                       negative  METHOD                 CLINT  -------------------------------------  -------------------------  AMOXICILLIN + CLAVULANATE        <=4/2 ug/ml  Resistant  AMPICILLIN ($$)             >8 00 ug/ml  Resistant  AMPICILLIN + SULBACTAM ($)       <=8/4 ug/ml  Resistant  CEFAZOLIN ($)              <=8 00 ug/ml Resistant  GENTAMICIN ($$)             >8 ug/ml   Resistant  NITROFURANTOIN             <=32 ug/ml  Susceptible  OXACILLIN                >2 00 ug/ml  Resistant  TETRACYCLINE              <=4 ug/ml   Susceptible  TRIMETHOPRIM + SULFAMETHOXAZOLE ($$$)  >2/38 ug/ml  Resistant  VANCOMYCIN ($)             4 00 ug/ml  Susceptible

## 2018-01-15 NOTE — RESULT NOTES
Verified Results  * MAMMO SCREENING BILATERAL W CAD [id number] 01:23PM [facility] Order Number: [id number]    - Patient Instructions: To schedule this appointment, please contact Central Scheduling at [phone]  Do not wear any perfume, powder, lotion or deodorant on breast or underarm area  Please bring your doctors order, referral (if needed) and insurance information with you on the day of the test  Failure to bring this information may result in this test being rescheduled  Arrive 15 minutes prior to your appointment time to register  On the day of your test, please bring any prior mammogram or breast studies with you that were not performed at a [facility]  Failure to bring prior exams may result in your test needing to be rescheduled  Test Name Result Flag Reference   MAMMO SCREENING BILATERAL W CAD (Report)     Patient History:   Family history of unknown cancer in father at age 67 and breast    cancer in mother at age 72  Patient is a former smoker  Patient's BMI is 43 3  Reason for exam: screening (asymptomatic)  Mammo Screening Bilateral W CAD: November 18, 2016 - Check In #:    [de-identified]   Bilateral CC and MLO view(s) were taken  Technologist: ALEKSANDRA Mansfield T (R)(M)   Prior study comparison: September 21, 2015, bilateral AMA dig    scrn mamm w/CAD performed at [facility]  April 18, 2014, bilateral digital screening mammogram,    performed at [address]  There are scattered fibroglandular densities  No dominant soft tissue mass, architectural distortion or    suspicious calcifications are noted  The skin and nipple    contours are within normal limits  No evidence of malignancy  No significant changes   when compared with prior studies  ASSESSMENT: BiRad:1 - Negative     Recommendation:   Routine screening mammogram of both breasts in 1 year  A    reminder letter will be sent  Analyzed by CAD     8-10% of cancers will be missed on mammography  Management of a    palpable abnormality must be based on clinical grounds  Patients   will be notified of their results via letter from our facility  Accredited by [employer] of [employer] and FDA  Transcription Location: ALEKSANDRA Sánchez 98: [id number]     Risk Value(s):   Tyrer-Cuzick 10 Year: 6 582%, Tyrer-Cuzick Lifetime: 17 434%,    Myriad Table: 1 5%, SÁNCHEZ 5 Year: 2 8%, NCI Lifetime: 15 5%   Signed by:    Devang Molina MD   11/18/16       Plan  Breast cancer genetic susceptibility    · 1 - Theron Vaughn MD, Aleyda Hua  (Surgical Oncology) Physician Referral  Consult-risk factor analysis  highe rthan excpected-? need for mor eadvanced testing, Thanks  Status: Active   Requested for: 20Nov2016  Care Summary provided  : Yes

## 2018-01-17 NOTE — RESULT NOTES
Verified Results  * CT CHEST WO CONTRAST [id number] 03:23PM Marsha Ownes     Test Name Result Flag Reference   CT CHEST WO CONTRAST (Report)     CT NECK AND CHEST WITHOUT IV CONTRAST     INDICATION: Palpable abnormality left supraclavicular region  COMPARISON: Ultrasound dated 7/11/2017  TECHNIQUE: CT examination of the neck and chest was performed  Reformatted images were created in axial, sagittal, and coronal planes  Radiation dose length product (DLP) for this visit: 792 66 mGy-cm (accession [id number]), 844 13 mGy-cm (accession [id number])  This examination, like all CT scans performed in the [facility], was performed utilizing techniques to minimize   radiation dose exposure, including the use of iterative reconstruction and automated exposure control  IMAGE QUALITY: Diagnostic  FINDINGS:     NECK     VISUALIZED BRAIN PARENCHYMA: No acute intracranial pathology of the visualized brain parenchyma  VISUALIZED ORBITS AND PARANASAL SINUSES: Normal      NASAL CAVITY AND NASOPHARYNX: Normal      SUPRAHYOID NECK: Normal oral cavity, tongue base, tonsillar fossa and epiglottis  INFRAHYOID NECK: Aryepiglottic folds, laryngeal tissues, and piriform sinuses are normal         THYROID GLAND: Normal      PAROTID AND SUBMANDIBULAR GLANDS: Normal      NECK LYMPH NODES: No pathologic or enlarged adenopathy  NECK VASCULAR STRUCTURES: Mild vascular calcification of the distal common carotid arteries and proximal cervical internal carotid arteries  Luminal stenosis cannot be assessed without contrast      NECK BONY STRUCTURES: Minimal cervical degenerative change  No significant central canal stenosis  At C5-6 there is mild canal stenosis with mild to moderate bilateral foraminal narrowing  Mild canal stenosis at C6-7  CHEST     LUNGS: Lungs are clear  There is no tracheal or endobronchial lesion  PLEURA: Unremarkable  No pleural effusion       HEART/GREAT VESSELS: No thoracic aortic aneurysm  Normal cardiac size  No pericardial effusion  MEDIASTINUM AND GIACOMO: Unremarkable  CHEST WALL: Unremarkable  In particular there is no adenopathy or soft tissue mass identified in the left supraclavicular region  The clavicle is unremarkable  VISUALIZED STRUCTURES OF THE UPPER ABDOMEN: Prior gastric bypass surgery  Previous cholecystectomy  CHEST OSSEOUS STRUCTURES: Minimal endplate degenerative change involving the thoracic spine without canal stenosis or foraminal narrowing  IMPRESSION:     Unremarkable CT of the neck and chest without contrast  In particular there is no adenopathy or mass identified in the left supraclavicular region to account for palpable abnormality  Cervical spondylitic degenerative change at C5-6 and C6-7  Workstation performed: [id number]     Signed by:   Carey Santacruz DO   7/31/17     CT SOFT TISSUE NECK WO CONTRAST [id number] 02:54PM Marsha Owens     Test Name Result Flag Reference   CT SOFT TISSUE NECK WO CONTRAST (Report)     CT NECK AND CHEST WITHOUT IV CONTRAST     INDICATION: Palpable abnormality left supraclavicular region  COMPARISON: Ultrasound dated 7/11/2017  TECHNIQUE: CT examination of the neck and chest was performed  Reformatted images were created in axial, sagittal, and coronal planes  Radiation dose length product (DLP) for this visit: 792 66 mGy-cm (accession [id number]), 844 13 mGy-cm (accession [id number])  This examination, like all CT scans performed in the [facility], was performed utilizing techniques to minimize   radiation dose exposure, including the use of iterative reconstruction and automated exposure control  IMAGE QUALITY: Diagnostic  FINDINGS:     NECK     VISUALIZED BRAIN PARENCHYMA: No acute intracranial pathology of the visualized brain parenchyma       VISUALIZED ORBITS AND PARANASAL SINUSES: Normal      NASAL CAVITY AND NASOPHARYNX: Normal      SUPRAHYOID NECK: Normal oral cavity, tongue base, tonsillar fossa and epiglottis  INFRAHYOID NECK: Aryepiglottic folds, laryngeal tissues, and piriform sinuses are normal         THYROID GLAND: Normal      PAROTID AND SUBMANDIBULAR GLANDS: Normal      NECK LYMPH NODES: No pathologic or enlarged adenopathy  NECK VASCULAR STRUCTURES: Mild vascular calcification of the distal common carotid arteries and proximal cervical internal carotid arteries  Luminal stenosis cannot be assessed without contrast      NECK BONY STRUCTURES: Minimal cervical degenerative change  No significant central canal stenosis  At C5-6 there is mild canal stenosis with mild to moderate bilateral foraminal narrowing  Mild canal stenosis at C6-7  CHEST     LUNGS: Lungs are clear  There is no tracheal or endobronchial lesion  PLEURA: Unremarkable  No pleural effusion  HEART/GREAT VESSELS: No thoracic aortic aneurysm  Normal cardiac size  No pericardial effusion  MEDIASTINUM AND GIACOMO: Unremarkable  CHEST WALL: Unremarkable  In particular there is no adenopathy or soft tissue mass identified in the left supraclavicular region  The clavicle is unremarkable  VISUALIZED STRUCTURES OF THE UPPER ABDOMEN: Prior gastric bypass surgery  Previous cholecystectomy  CHEST OSSEOUS STRUCTURES: Minimal endplate degenerative change involving the thoracic spine without canal stenosis or foraminal narrowing  IMPRESSION:     Unremarkable CT of the neck and chest without contrast  In particular there is no adenopathy or mass identified in the left supraclavicular region to account for palpable abnormality  Cervical spondylitic degenerative change at C5-6 and C6-7         Workstation performed: [id number]     Signed by:   Velma Dia DO   7/31/17

## 2018-01-23 VITALS
WEIGHT: 244 LBS | DIASTOLIC BLOOD PRESSURE: 80 MMHG | HEART RATE: 80 BPM | BODY MASS INDEX: 41.66 KG/M2 | RESPIRATION RATE: 16 BRPM | HEIGHT: 64 IN | SYSTOLIC BLOOD PRESSURE: 130 MMHG | TEMPERATURE: 98.9 F

## 2018-02-05 DIAGNOSIS — I10 ESSENTIAL HYPERTENSION: Primary | ICD-10-CM

## 2018-02-05 RX ORDER — AMLODIPINE BESYLATE 5 MG/1
TABLET ORAL
Qty: 30 TABLET | Refills: 0 | Status: SHIPPED | OUTPATIENT
Start: 2018-02-05 | End: 2018-06-18

## 2018-02-07 ENCOUNTER — TELEPHONE (OUTPATIENT)
Dept: FAMILY MEDICINE CLINIC | Facility: CLINIC | Age: 58
End: 2018-02-07

## 2018-02-07 DIAGNOSIS — F98.8 ATTENTION DEFICIT DISORDER (ADD) WITHOUT HYPERACTIVITY: Primary | ICD-10-CM

## 2018-02-07 RX ORDER — DEXTROAMPHETAMINE SACCHARATE, AMPHETAMINE ASPARTATE, DEXTROAMPHETAMINE SULFATE AND AMPHETAMINE SULFATE 7.5; 7.5; 7.5; 7.5 MG/1; MG/1; MG/1; MG/1
30 TABLET ORAL 2 TIMES DAILY
Qty: 60 TABLET | Refills: 0 | Status: SHIPPED | OUTPATIENT
Start: 2018-02-07 | End: 2018-03-08 | Stop reason: SDUPTHER

## 2018-03-02 DIAGNOSIS — G47.00 INSOMNIA, UNSPECIFIED TYPE: Primary | ICD-10-CM

## 2018-03-02 RX ORDER — CLONAZEPAM 2 MG/1
TABLET ORAL
Qty: 90 TABLET | Refills: 0 | Status: SHIPPED | OUTPATIENT
Start: 2018-03-02 | End: 2018-05-22 | Stop reason: SDUPTHER

## 2018-03-05 ENCOUNTER — TELEPHONE (OUTPATIENT)
Dept: FAMILY MEDICINE CLINIC | Facility: CLINIC | Age: 58
End: 2018-03-05

## 2018-03-08 ENCOUNTER — TELEPHONE (OUTPATIENT)
Dept: FAMILY MEDICINE CLINIC | Facility: CLINIC | Age: 58
End: 2018-03-08

## 2018-03-08 ENCOUNTER — DOCUMENTATION (OUTPATIENT)
Dept: FAMILY MEDICINE CLINIC | Facility: CLINIC | Age: 58
End: 2018-03-08

## 2018-03-08 DIAGNOSIS — F98.8 ATTENTION DEFICIT DISORDER (ADD) WITHOUT HYPERACTIVITY: ICD-10-CM

## 2018-03-08 DIAGNOSIS — F41.9 ANXIETY: Primary | ICD-10-CM

## 2018-03-08 RX ORDER — LORAZEPAM 2 MG/1
2 TABLET ORAL 4 TIMES DAILY PRN
Qty: 120 TABLET | Refills: 2 | Status: SHIPPED | OUTPATIENT
Start: 2018-03-08 | End: 2018-07-23 | Stop reason: SDUPTHER

## 2018-03-08 RX ORDER — DEXTROAMPHETAMINE SACCHARATE, AMPHETAMINE ASPARTATE, DEXTROAMPHETAMINE SULFATE AND AMPHETAMINE SULFATE 7.5; 7.5; 7.5; 7.5 MG/1; MG/1; MG/1; MG/1
30 TABLET ORAL 2 TIMES DAILY
Qty: 60 TABLET | Refills: 0 | Status: SHIPPED | OUTPATIENT
Start: 2018-03-08 | End: 2018-04-06 | Stop reason: SDUPTHER

## 2018-04-01 ENCOUNTER — HOSPITAL ENCOUNTER (EMERGENCY)
Facility: HOSPITAL | Age: 58
Discharge: HOME | End: 2018-04-01
Attending: EMERGENCY MEDICINE | Admitting: EMERGENCY MEDICINE
Payer: COMMERCIAL

## 2018-04-01 ENCOUNTER — APPOINTMENT (EMERGENCY)
Dept: CT IMAGING | Facility: HOSPITAL | Age: 58
End: 2018-04-01
Payer: COMMERCIAL

## 2018-04-01 ENCOUNTER — APPOINTMENT (EMERGENCY)
Dept: RADIOLOGY | Facility: HOSPITAL | Age: 58
End: 2018-04-01
Payer: COMMERCIAL

## 2018-04-01 VITALS
HEART RATE: 64 BPM | RESPIRATION RATE: 18 BRPM | DIASTOLIC BLOOD PRESSURE: 74 MMHG | OXYGEN SATURATION: 97 % | TEMPERATURE: 97.8 F | SYSTOLIC BLOOD PRESSURE: 165 MMHG

## 2018-04-01 DIAGNOSIS — R42 VERTIGO: Primary | ICD-10-CM

## 2018-04-01 DIAGNOSIS — N39.0 ACUTE URINARY TRACT INFECTION: ICD-10-CM

## 2018-04-01 DIAGNOSIS — Z91.81 HX OF FALL: ICD-10-CM

## 2018-04-01 LAB
ANION GAP SERPL CALCULATED.3IONS-SCNC: 8 MMOL/L (ref 4–13)
BACTERIA UR QL AUTO: ABNORMAL /HPF
BASOPHILS # BLD AUTO: 0.05 THOUSANDS/ΜL (ref 0–0.1)
BASOPHILS NFR BLD AUTO: 1 % (ref 0–1)
BILIRUB UR QL STRIP: NEGATIVE
BUN SERPL-MCNC: 20 MG/DL (ref 5–25)
CALCIUM SERPL-MCNC: 9.7 MG/DL (ref 8.3–10.1)
CHLORIDE SERPL-SCNC: 102 MMOL/L (ref 100–108)
CLARITY UR: ABNORMAL
CO2 SERPL-SCNC: 30 MMOL/L (ref 21–32)
COLOR UR: YELLOW
CREAT SERPL-MCNC: 1.09 MG/DL (ref 0.6–1.3)
EOSINOPHIL # BLD AUTO: 0.31 THOUSAND/ΜL (ref 0–0.61)
EOSINOPHIL NFR BLD AUTO: 4 % (ref 0–6)
ERYTHROCYTE [DISTWIDTH] IN BLOOD BY AUTOMATED COUNT: 14.6 % (ref 11.6–15.1)
GFR SERPL CREATININE-BSD FRML MDRD: 55 ML/MIN/1.73SQ M
GLUCOSE SERPL-MCNC: 95 MG/DL (ref 65–140)
GLUCOSE UR STRIP-MCNC: NEGATIVE MG/DL
HCT VFR BLD AUTO: 39.7 % (ref 34.8–46.1)
HGB BLD-MCNC: 12.9 G/DL (ref 11.5–15.4)
HGB UR QL STRIP.AUTO: ABNORMAL
KETONES UR STRIP-MCNC: NEGATIVE MG/DL
LEUKOCYTE ESTERASE UR QL STRIP: ABNORMAL
LYMPHOCYTES # BLD AUTO: 2.09 THOUSANDS/ΜL (ref 0.6–4.47)
LYMPHOCYTES NFR BLD AUTO: 28 % (ref 14–44)
MCH RBC QN AUTO: 27.9 PG (ref 26.8–34.3)
MCHC RBC AUTO-ENTMCNC: 32.5 G/DL (ref 31.4–37.4)
MCV RBC AUTO: 86 FL (ref 82–98)
MONOCYTES # BLD AUTO: 0.59 THOUSAND/ΜL (ref 0.17–1.22)
MONOCYTES NFR BLD AUTO: 8 % (ref 4–12)
NEUTROPHILS # BLD AUTO: 4.49 THOUSANDS/ΜL (ref 1.85–7.62)
NEUTS SEG NFR BLD AUTO: 59 % (ref 43–75)
NITRITE UR QL STRIP: POSITIVE
NON-SQ EPI CELLS URNS QL MICRO: ABNORMAL /HPF
NRBC BLD AUTO-RTO: 0 /100 WBCS
PH UR STRIP.AUTO: 5.5 [PH] (ref 4.5–8)
PLATELET # BLD AUTO: 257 THOUSANDS/UL (ref 149–390)
PMV BLD AUTO: 9.8 FL (ref 8.9–12.7)
POTASSIUM SERPL-SCNC: 4.1 MMOL/L (ref 3.5–5.3)
PROT UR STRIP-MCNC: NEGATIVE MG/DL
RBC # BLD AUTO: 4.62 MILLION/UL (ref 3.81–5.12)
RBC #/AREA URNS AUTO: ABNORMAL /HPF
SODIUM SERPL-SCNC: 140 MMOL/L (ref 136–145)
SP GR UR STRIP.AUTO: <=1.005 (ref 1–1.03)
UROBILINOGEN UR QL STRIP.AUTO: 0.2 E.U./DL
WBC # BLD AUTO: 7.53 THOUSAND/UL (ref 4.31–10.16)
WBC #/AREA URNS AUTO: ABNORMAL /HPF

## 2018-04-01 PROCEDURE — 81002 URINALYSIS NONAUTO W/O SCOPE: CPT | Performed by: EMERGENCY MEDICINE

## 2018-04-01 PROCEDURE — 99285 EMERGENCY DEPT VISIT HI MDM: CPT

## 2018-04-01 PROCEDURE — 70450 CT HEAD/BRAIN W/O DYE: CPT

## 2018-04-01 PROCEDURE — 93005 ELECTROCARDIOGRAM TRACING: CPT

## 2018-04-01 PROCEDURE — 36415 COLL VENOUS BLD VENIPUNCTURE: CPT | Performed by: EMERGENCY MEDICINE

## 2018-04-01 PROCEDURE — 80048 BASIC METABOLIC PNL TOTAL CA: CPT | Performed by: EMERGENCY MEDICINE

## 2018-04-01 PROCEDURE — 71046 X-RAY EXAM CHEST 2 VIEWS: CPT

## 2018-04-01 PROCEDURE — 85025 COMPLETE CBC W/AUTO DIFF WBC: CPT | Performed by: EMERGENCY MEDICINE

## 2018-04-01 PROCEDURE — 81001 URINALYSIS AUTO W/SCOPE: CPT

## 2018-04-01 RX ORDER — SCOLOPAMINE TRANSDERMAL SYSTEM 1 MG/1
1 PATCH, EXTENDED RELEASE TRANSDERMAL
Qty: 4 PATCH | Refills: 0 | Status: SHIPPED | OUTPATIENT
Start: 2018-04-01 | End: 2018-05-07 | Stop reason: CLARIF

## 2018-04-01 RX ORDER — NITROFURANTOIN 25; 75 MG/1; MG/1
100 CAPSULE ORAL 2 TIMES DAILY
Qty: 10 CAPSULE | Refills: 0 | Status: SHIPPED | OUTPATIENT
Start: 2018-04-01 | End: 2018-04-06 | Stop reason: CLARIF

## 2018-04-01 RX ORDER — SCOLOPAMINE TRANSDERMAL SYSTEM 1 MG/1
1 PATCH, EXTENDED RELEASE TRANSDERMAL
Status: DISCONTINUED | OUTPATIENT
Start: 2018-04-01 | End: 2018-04-01 | Stop reason: HOSPADM

## 2018-04-01 RX ADMIN — SCOPALAMINE 1 PATCH: 1 PATCH, EXTENDED RELEASE TRANSDERMAL at 15:46

## 2018-04-01 NOTE — ED PROVIDER NOTES
History  Chief Complaint   Patient presents with    Dizziness     fell aprox 2 weeks ago d/t dizziness and hit posterior head on cement  denies LOC at that time  denies thinners  reports dizziness/stumbling x "a couple of years"   "stabbing pain inside R ear comes and goes for years"  denies nausea/vomiting/headache  denies CP/SOB     58-year-old female presents for evaluation of intermittent dizziness over the past several months  She states that associated position changes, occur suddenly when she changes position, last for several minutes and resolved on its own  Associated with feeling unsteady when she walks that is caused her to suffer several falls  She last fell over 1 and half weeks ago  She denies headaches, focal neuro deficits or weakness, tinnitus, cough, URI symptoms, traumatic injuries, neck pain or neck stiffness, chest pain, shortness of breath, cough, URI symptoms, palpitations  History provided by:  Patient  Dizziness   Associated symptoms: no blood in stool, no chest pain, no diarrhea, no headaches, no nausea, no palpitations, no shortness of breath, no vomiting and no weakness        Prior to Admission Medications   Prescriptions Last Dose Informant Patient Reported? Taking?    FLUoxetine (PROzac) 40 MG capsule   Yes No   Sig: Take 20 mg by mouth 2 (two) times a day     LORazepam (ATIVAN) 2 mg tablet   No No   Sig: Take 1 tablet (2 mg total) by mouth 4 (four) times a day as needed for anxiety   QUEtiapine (SEROquel XR) 150 mg 24 hr tablet   Yes No   Sig: Take 100 mg by mouth daily at bedtime     amLODIPine (NORVASC) 5 mg tablet   No No   Sig: TAKE 1 TABLET BY MOUTH DAILY   amphetamine-dextroamphetamine (ADDERALL) 30 MG tablet   No No   Sig: Take 1 tablet (30 mg total) by mouth 2 (two) times a day for 30 days Earliest Fill Date: 3/8/18 Max Daily Amount: 60 mg   clonazePAM (KlonoPIN) 2 mg tablet   No No   Sig: TAKE ONE TABLET BY MOUTH DAILY   levothyroxine 100 mcg tablet   Yes No Sig: Take 100 mcg by mouth daily  montelukast (SINGULAIR) 10 mg tablet   Yes No   Sig: Take 10 mg by mouth daily at bedtime  Facility-Administered Medications: None       Past Medical History:   Diagnosis Date    ADD (attention deficit disorder)     Anxiety     Arthritis     Asthma     Bipolar 1 disorder (HCC)     Cellulitis of leg     Depression     Difficulty swallowing     Disease of thyroid gland     hypothyroid    Dysphagia     Elevated lipids     Hearing loss     Hiatal hernia     diaphragmatic    Hyperlipidemia     Hypertension     Psychiatric disorder     Bipolar    Schatzki's ring     Sleep apnea     Wears eyeglasses        Past Surgical History:   Procedure Laterality Date    AMPUTATION      Right little toe    BARIATRIC SURGERY      gastric sleeve    BUNIONECTOMY      CHOLECYSTECTOMY      ESOPHAGEAL DILATION      FOREARM SURGERY Left     FRACTURE REPAIR WITH METAL AND METAL REPLACED    GASTRIC BYPASS      HYSTERECTOMY      JOINT REPLACEMENT      knee    KNEE SURGERY Left     PENILE ADHESIONS LYSIS      onset: 9/24/14    NM COLONOSCOPY FLX DX W/COLLJ SPEC WHEN PFRMD N/A 6/17/2016    Procedure: EGD AND COLONOSCOPY;  Surgeon: Ashleigh Vega MD;  Location: [facility]; Service: Gastroenterology    NM ESOPHAGOGASTRODUODENOSCOPY TRANSORAL DIAGNOSTIC N/A 4/11/2017    Procedure: ESOPHAGOGASTRODUODENOSCOPY WITH BALLOON DILATATION;  Surgeon: Julita Winters MD;  Location: [facility];   Service: Gastroenterology    REPLACEMENT TOTAL KNEE      Left Side    ROTATOR CUFF REPAIR Left     SKIN LESION EXCISION      thighs    SLEEVE GASTROPLASTY      gastric sleeve    TONSILLECTOMY         Family History   Problem Relation Age of Onset   Gaurang Hernandez Breast cancer Mother 62    Lung cancer Mother     Alcohol abuse Father     Substance Abuse Father     Prostate cancer Father 79    Anxiety disorder Brother     Alcohol abuse Brother     Substance Abuse Brother     Mental illness Brother  Hepatitis Brother     Liver cancer Maternal Grandmother 72    Lung cancer Maternal Grandfather     Brain cancer Maternal Uncle     Breast cancer Paternal Aunt 71    Mental illness Family     Lung cancer Maternal Aunt      I have reviewed and agree with the history as documented  Social History   Substance Use Topics    Smoking status: Former Smoker     Quit date: 2011    Smokeless tobacco: Never Used    Alcohol use Yes      Comment: occasional- per allscripts        Review of Systems   Constitutional: Negative for activity change, appetite change, fatigue and fever  HENT: Negative for congestion, dental problem, ear pain, rhinorrhea and sore throat  Eyes: Negative for pain and redness  Respiratory: Negative for chest tightness, shortness of breath and wheezing  Cardiovascular: Negative for chest pain and palpitations  Gastrointestinal: Negative for abdominal pain, blood in stool, constipation, diarrhea, nausea and vomiting  Endocrine: Negative for cold intolerance and heat intolerance  Genitourinary: Negative for dysuria, frequency and hematuria  Musculoskeletal: Negative for arthralgias and myalgias  Skin: Negative for color change, pallor and rash  Neurological: Positive for dizziness and light-headedness  Negative for tremors, seizures, syncope, facial asymmetry, speech difficulty, weakness, numbness and headaches  Hematological: Does not bruise/bleed easily  Psychiatric/Behavioral: Negative for agitation, hallucinations and suicidal ideas         Physical Exam  ED Triage Vitals   Temperature Pulse Respirations Blood Pressure SpO2   04/01/18 1408 04/01/18 1408 04/01/18 1408 04/01/18 1408 04/01/18 1408   97 8 °F (36 6 °C) 67 18 159/78 95 %      Temp src Heart Rate Source Patient Position - Orthostatic VS BP Location FiO2 (%)   -- 04/01/18 1554 04/01/18 1554 04/01/18 1554 --    Monitor Lying Left arm       Pain Score       04/01/18 1408       No Pain           Orthostatic Vital Signs  Vitals:    04/01/18 1408 04/01/18 1554   BP: 159/78 165/74   Pulse: 67 64   Patient Position - Orthostatic VS:  Lying       Physical Exam   Constitutional: She appears well-developed and well-nourished  HENT:   Normocephalic/atraumatic  There is no facial bone tenderness palpation  No facial bone tenderness  No walsh sign, no raccoon eyes, no hemotympanum  Nose is atraumatic  No evidence of epistaxis  Eyes:   Patient has painless full range of motion in both her eyes  Normal visual fields  No hyphema noted  Neck: No tracheal deviation present  Patient is nontender palpation over her cervical, thoracic, lumbar spines  There is no step-offs, no deformities  Cardiovascular:   No JVD noted  Heart sounds are normal  Regular rate rate and rhythm  Symmetric strong distal pulses  Pulmonary/Chest:   Chest wall is stable and nontender palpation  There is no crepitus noted  Patient has symmetric chest wall expansion  No flail segment noted clear and equal breath sounds bilateral    Abdominal:   No external signs of trauma noted on the abdomen/pelvis  Patient is nontender palpation of the abdomen  There is no rebound, guarding, CVA tenderness  Abdomen is nondistended  Pelvis stable, nttp  Musculoskeletal:   Right upper extremity has full range of motion without pain  There is no tenderness palpation noted  Patient has no external signs of trauma  Patient is neurovascularly intact in this extremity  Left upper extremity has full range of motion without pain  There is no tenderness palpation noted  Patient has no external signs of trauma  Patient is neurovascularly intact in this extremity  Right lower extremity has full range of motion without pain  There is no tenderness palpation noted  Patient has no external signs of trauma  Patient is neurovascularly intact in this extremity  Left Lower  extremity has full range of motion without pain  There is no tenderness palpation noted   Patient has no external signs of trauma  Patient is neurovascularly intact in this extremity  Neurological:   Alert and oriented ×3  Normal mental status exam  Normal cranial nerves II through XII  Normal sensation and strength throughout  Normal cerebellar exam  GCS 15  Horizontal nystagmus which is fatigable  Skin:   No external signs of trauma  Psychiatric: She has a normal mood and affect  Her behavior is normal  Judgment normal    Nursing note and vitals reviewed  ED Medications  Medications   scopolamine (TRANSDERM-SCOP) 1 5 mg/3 days TD 72 hr patch 1 patch (1 patch Transdermal Medication Applied 4/1/18 1546)       Diagnostic Studies  Results Reviewed     Procedure Component Value Units Date/Time    Basic metabolic panel [[id number]] Collected:  04/01/18 1548    Lab Status:  Final result Specimen:  Blood from Arm, Right Updated:  04/01/18 1611     Sodium 140 mmol/L      Potassium 4 1 mmol/L      Chloride 102 mmol/L      CO2 30 mmol/L      Anion Gap 8 mmol/L      BUN 20 mg/dL      Creatinine 1 09 mg/dL      Glucose 95 mg/dL      Calcium 9 7 mg/dL      eGFR 55 ml/min/1 73sq m     Narrative:         National Kidney Disease Education Program recommendations are as follows:  GFR calculation is accurate only with a steady state creatinine  Chronic Kidney disease less than 60 ml/min/1 73 sq  meters  Kidney failure less than 15 ml/min/1 73 sq  meters      Urine Microscopic [[id number]]  (Abnormal) Collected:  04/01/18 1542    Lab Status:  Final result Specimen:  Urine from Urine, Clean Catch Updated:  04/01/18 1604     RBC, UA None Seen /hpf      WBC, UA 1-2 (A) /hpf      Epithelial Cells Occasional /hpf      Bacteria, UA Moderate (A) /hpf     CBC and differential [[id number]]  (Normal) Collected:  04/01/18 1548    Lab Status:  Final result Specimen:  Blood from Arm, Right Updated:  04/01/18 1600     WBC 7 53 Thousand/uL      RBC 4 62 Million/uL      Hemoglobin 12 9 g/dL      Hematocrit 39 7 %      MCV 86 fL      MCH 27 9 pg MCHC 32 5 g/dL      RDW 14 6 %      MPV 9 8 fL      Platelets 219 Thousands/uL      nRBC 0 /100 WBCs      Neutrophils Relative 59 %      Lymphocytes Relative 28 %      Monocytes Relative 8 %      Eosinophils Relative 4 %      Basophils Relative 1 %      Neutrophils Absolute 4 49 Thousands/µL      Lymphocytes Absolute 2 09 Thousands/µL      Monocytes Absolute 0 59 Thousand/µL      Eosinophils Absolute 0 31 Thousand/µL      Basophils Absolute 0 05 Thousands/µL     POCT urinalysis dipstick [[id number]]  (Abnormal) Resulted:  04/01/18 1543    Lab Status:  Final result Specimen:  Urine Updated:  04/01/18 1545    ED Urine Macroscopic [[id number]]  (Abnormal) Collected:  04/01/18 1542    Lab Status:  Final result Specimen:  Urine Updated:  04/01/18 1543     Color, UA Yellow     Clarity, UA Slightly Cloudy     pH, UA 5 5     Leukocytes, UA Trace (A)     Nitrite, UA Positive (A)     Protein, UA Negative mg/dl      Glucose, UA Negative mg/dl      Ketones, UA Negative mg/dl      Urobilinogen, UA 0 2 E U /dl      Bilirubin, UA Negative     Blood, UA Trace (A)     Specific Lyme, UA <=1 005    Narrative:       CLINITEK RESULT                 XR chest 2 views   ED Interpretation by Lee Luis MD (04/01 1607)   Primary reviewed  No acute abnormality  Final Result by Karen Daley MD (04/01 1641)      No acute cardiopulmonary disease  Workstation performed: [id number]         CT head without contrast   ED Interpretation by Lee Luis MD (04/01 1542)   No acute intracranial abnormality  Final Result by Karen Daley MD (04/01 1531)      No acute intracranial abnormality                    Workstation performed: [id number]                    Procedures  ECG 12 Lead Documentation  Date/Time: 4/1/2018 3:00 PM  Performed by: Winsome Bautista by: Gil Grewal     ECG reviewed by me, the ED Provider: yes    Patient location:  ED  Rate:     ECG rate:  62    ECG rate assessment: normal    Rhythm: Rhythm: sinus rhythm    Ectopy:     Ectopy: none    QRS:     QRS axis:  Normal    QRS intervals:  Normal  Conduction:     Conduction: normal    ST segments:     ST segments:  Normal  T waves:     T waves: normal             Phone Contacts  ED Phone Contact    ED Course  ED Course as of Apr 01 1716   Sun Apr 01, 2018   1606 Will tx for uti Nitrite, UA: (!) Positive   1637 Workup reviewed  Patient is suffering from urinary tract infection  Will treat with antibiotics  Patient states her symptoms have resolved  Will discharged home  MDM  Number of Diagnoses or Management Options  Acute urinary tract infection:   Hx of fall:   Vertigo:   Diagnosis management comments: Intermittent vertigo and multiple falls with a benign neuro/trauma exam-will CT head, EKG, cardiac labs, urine dip, symptomatic treatment, reassess  CritCare Time    Disposition  Final diagnoses:   Vertigo   Acute urinary tract infection   Hx of fall     Time reflects when diagnosis was documented in both MDM as applicable and the Disposition within this note     Time User Action Codes Description Comment    4/1/2018  4:37 PM Jimbo Maharaj Add [R42] Vertigo     4/1/2018  4:37 PM Jimbo Maharaj Add [N39 0] Acute urinary tract infection     4/1/2018  4:37 PM Yasmeen Morales Add [Z91 81] Hx of fall       ED Disposition     ED Disposition Condition Comment    Discharge  [place] discharge to home/self care      Condition at discharge: Good        Follow-up Information     Follow up With Specialties Details Why Rosa Cui MD Family Medicine Schedule an appointment as soon as possible for a visit in 2 days  [facility]  [phone]          Discharge Medication List as of 4/1/2018  4:41 PM      START taking these medications    Details   nitrofurantoin (MACROBID) 100 mg capsule Take 1 capsule (100 mg total) by mouth 2 (two) times a day for 5 days, Starting Sun 4/1/2018, Until Fri 4/6/2018, Normal      scopolamine (TRANSDERM-SCOP) 1 5 mg/3 days TD 72 hr patch Place 1 patch on the skin every third day, Starting Sun 4/1/2018, Normal         CONTINUE these medications which have NOT CHANGED    Details   amLODIPine (NORVASC) 5 mg tablet TAKE 1 TABLET BY MOUTH DAILY, Normal      amphetamine-dextroamphetamine (ADDERALL) 30 MG tablet Take 1 tablet (30 mg total) by mouth 2 (two) times a day for 30 days Earliest Fill Date: 3/8/18 Max Daily Amount: 60 mg, Starting u 3/8/2018, Until Sat 4/7/2018, Print      clonazePAM (KlonoPIN) 2 mg tablet TAKE ONE TABLET BY MOUTH DAILY, Print      FLUoxetine (PROzac) 40 MG capsule Take 20 mg by mouth 2 (two) times a day  , Until Discontinued, Historical Med      levothyroxine 100 mcg tablet Take 100 mcg by mouth daily  , Until Discontinued, Historical Med      LORazepam (ATIVAN) 2 mg tablet Take 1 tablet (2 mg total) by mouth 4 (four) times a day as needed for anxiety, Starting Thu 3/8/2018, Print      montelukast (SINGULAIR) 10 mg tablet Take 10 mg by mouth daily at bedtime  , Until Discontinued, Historical Med      QUEtiapine (SEROquel XR) 150 mg 24 hr tablet Take 100 mg by mouth daily at bedtime  , Until Discontinued, Historical Med           No discharge procedures on file      ED Provider  Electronically Signed by           Jono Dasilva MD  04/01/18 619 [address], [place]  04/01/18 2922

## 2018-04-01 NOTE — DISCHARGE INSTRUCTIONS
Dizziness, Ambulatory Care   GENERAL INFORMATION:   Dizziness  is a term used to describe a feeling of being off balance or unsteady  Common causes of dizziness are an inner ear fluid imbalance or a lack of oxygen in your blood  Your dizziness may be acute (lasts 3 days or less) or chronic (lasts longer than 3 days)  You may have dizzy spells that last from seconds to a few hours  Common symptoms related to dizziness include the following:   · A feeling that your surroundings are moving even though you are standing still    · Ringing in your ears or hearing loss     · Feeling faint or lightheaded     · Weakness or unsteadiness     · Double vision or eye movements you cannot control    · Nausea or vomiting     · Confusion  Seek immediate care for the following symptoms:   · You have a headache that does not go away with medicine  · You have shaking chills and a fever  · You vomit over and over with no relief  · Your vomit or bowel movements are red or black  · You have pain in your chest, back, or abdomen  · You have numbness, especially in your face, arms, or legs  · You have trouble moving your arms or legs  Treatment for dizziness  depends on the cause of your dizziness  You may need medicines to decrease your dizziness or nausea  You may need to be admitted to the hospital for treatment  Manage your symptoms:  Get up slowly from sitting or lying down  Do not drive or operate machinery when you are dizzy  Follow up with your healthcare provider as directed:  Write down your questions so you remember to ask them during your visits  CARE AGREEMENT:   You have the right to help plan your care  Learn about your health condition and how it may be treated  Discuss treatment options with your caregivers to decide what care you want to receive  You always have the right to refuse treatment  The above information is an  only   It is not intended as medical advice for individual conditions or treatments  Talk to your doctor, nurse or pharmacist before following any medical regimen to see if it is safe and effective for you  © 2014 [address] is for End User's use only and may not be sold, redistributed or otherwise used for commercial purposes  All illustrations and images included in CareNotes® are the copyrighted property of [employer]  or Ignacio Koehler  Urinary Tract Infection in Women, Ambulatory Care   GENERAL INFORMATION:   A urinary tract infection (UTI)  is caused by bacteria that get inside your urinary tract  Most bacteria that enter your urinary tract are expelled when you urinate  If the bacteria stay in your urinary tract, you may get an infection  Your urinary tract includes your kidneys, ureters, bladder, and urethra  Urine is made in your kidneys, and it flows from the ureters to the bladder  Urine leaves the bladder through the urethra  A UTI is more common in your lower urinary tract, which includes your bladder and urethra  Common symptoms include the following:   · Urinating more often or waking from sleep to urinate    · Pain or burning when you urinate    · Pain or pressure in your lower abdomen     · Urine that smells bad    · Blood in your urine    · Leaking urine  Seek immediate care for the following symptoms:   · Urinating very little or not at all    · Vomiting    · Shaking chills with a fever    · Side or back pain that gets worse  Treatment for a UTI  may include medicines to treat a bacterial infection  You may also need medicines to decrease pain and burning, or decrease the urge to urinate often  Prevent a UTI:   · Urinate when you feel the urge  Do not hold your urine  Urinate as soon as you feel you have to  · Drink liquids as directed  Ask how much liquid to drink each day and which liquids are best for you  You may need to drink more fluids than usual to help flush out the bacteria   Do not drink alcohol, caffeine, and citrus juices  These can irritate your bladder and increase your symptoms  · Apply heat  on your abdomen for 20 to 30 minutes every 2 hours for as many days as directed  Heat helps decrease discomfort and pressure in your bladder  Follow up with your healthcare provider as directed:  Write down your questions so you remember to ask them during your visits  CARE AGREEMENT:   You have the right to help plan your care  Learn about your health condition and how it may be treated  Discuss treatment options with your caregivers to decide what care you want to receive  You always have the right to refuse treatment  The above information is an  only  It is not intended as medical advice for individual conditions or treatments  Talk to your doctor, nurse or pharmacist before following any medical regimen to see if it is safe and effective for you  © 2014 [address] is for End User's use only and may not be sold, redistributed or otherwise used for commercial purposes  All illustrations and images included in CareNotes® are the copyrighted property of [employer]  or Ignacio Koehler

## 2018-04-02 LAB
ATRIAL RATE: 62 BPM
P AXIS: 65 DEGREES
PR INTERVAL: 146 MS
QRS AXIS: 60 DEGREES
QRSD INTERVAL: 82 MS
QT INTERVAL: 420 MS
QTC INTERVAL: 426 MS
T WAVE AXIS: 30 DEGREES
VENTRICULAR RATE: 62 BPM

## 2018-04-02 PROCEDURE — 93010 ELECTROCARDIOGRAM REPORT: CPT | Performed by: INTERNAL MEDICINE

## 2018-04-03 DIAGNOSIS — F32.A DEPRESSION, UNSPECIFIED DEPRESSION TYPE: Primary | ICD-10-CM

## 2018-04-04 RX ORDER — QUETIAPINE 150 MG/1
150 TABLET, FILM COATED, EXTENDED RELEASE ORAL
Qty: 90 TABLET | Refills: 0 | Status: SHIPPED | OUTPATIENT
Start: 2018-04-04 | End: 2018-04-06 | Stop reason: SDUPTHER

## 2018-04-06 ENCOUNTER — OFFICE VISIT (OUTPATIENT)
Dept: FAMILY MEDICINE CLINIC | Facility: CLINIC | Age: 58
End: 2018-04-06
Payer: COMMERCIAL

## 2018-04-06 VITALS
RESPIRATION RATE: 18 BRPM | TEMPERATURE: 98.6 F | SYSTOLIC BLOOD PRESSURE: 122 MMHG | BODY MASS INDEX: 42.91 KG/M2 | WEIGHT: 242.2 LBS | HEIGHT: 63 IN | HEART RATE: 78 BPM | DIASTOLIC BLOOD PRESSURE: 88 MMHG

## 2018-04-06 DIAGNOSIS — R42 VERTIGO: Primary | ICD-10-CM

## 2018-04-06 DIAGNOSIS — R42 VERTIGO: ICD-10-CM

## 2018-04-06 DIAGNOSIS — F98.8 ATTENTION DEFICIT DISORDER (ADD) WITHOUT HYPERACTIVITY: ICD-10-CM

## 2018-04-06 DIAGNOSIS — F31.78 BIPOLAR 1 DISORDER, MIXED, FULL REMISSION (HCC): ICD-10-CM

## 2018-04-06 DIAGNOSIS — H91.93 BILATERAL HEARING LOSS, UNSPECIFIED HEARING LOSS TYPE: Primary | ICD-10-CM

## 2018-04-06 DIAGNOSIS — H91.93 BILATERAL HEARING LOSS, UNSPECIFIED HEARING LOSS TYPE: ICD-10-CM

## 2018-04-06 PROCEDURE — 3725F SCREEN DEPRESSION PERFORMED: CPT | Performed by: FAMILY MEDICINE

## 2018-04-06 PROCEDURE — 99213 OFFICE O/P EST LOW 20 MIN: CPT | Performed by: FAMILY MEDICINE

## 2018-04-06 RX ORDER — QUETIAPINE FUMARATE 100 MG/1
100 TABLET, FILM COATED ORAL
Qty: 90 TABLET | Refills: 1 | Status: SHIPPED | OUTPATIENT
Start: 2018-04-06 | End: 2018-09-26 | Stop reason: SDUPTHER

## 2018-04-06 RX ORDER — FLUOXETINE HYDROCHLORIDE 20 MG/1
40 CAPSULE ORAL DAILY
Refills: 0 | COMMUNITY
Start: 2018-03-24 | End: 2018-06-21 | Stop reason: SDUPTHER

## 2018-04-06 RX ORDER — DEXTROAMPHETAMINE SACCHARATE, AMPHETAMINE ASPARTATE, DEXTROAMPHETAMINE SULFATE AND AMPHETAMINE SULFATE 7.5; 7.5; 7.5; 7.5 MG/1; MG/1; MG/1; MG/1
30 TABLET ORAL 2 TIMES DAILY
Qty: 60 TABLET | Refills: 0 | Status: SHIPPED | OUTPATIENT
Start: 2018-04-06 | End: 2018-05-07 | Stop reason: SDUPTHER

## 2018-04-06 RX ORDER — QUETIAPINE FUMARATE 100 MG/1
1 TABLET, FILM COATED ORAL
Refills: 0 | COMMUNITY
Start: 2018-01-15 | End: 2018-04-06 | Stop reason: CLARIF

## 2018-04-06 NOTE — PROGRESS NOTES
Assessment/Plan:    No problem-specific Assessment & Plan notes found for this encounter  There are no diagnoses linked to this encounter  Subjective:      Patient ID: Amando Moe is a 59 y o  female  Dizziness   This is a new problem  The current episode started more than 1 month ago  The problem occurs daily  The problem has been gradually worsening  Pertinent negatives include no chest pain, nausea, numbness, sore throat or vomiting  Nothing aggravates the symptoms  She has tried nothing for the symptoms  The following portions of the patient's history were reviewed and updated as appropriate: allergies, current medications, past family history, past medical history, past social history, past surgical history and problem list     Review of Systems   Constitutional: Negative for unexpected weight change  HENT: Positive for ear pain  Negative for sore throat  Respiratory: Negative for shortness of breath  Cardiovascular: Negative for chest pain  Gastrointestinal: Negative for nausea and vomiting  Neurological: Positive for dizziness  Negative for numbness  Hematological: Negative for adenopathy  Objective:      /88 (BP Location: Left arm, Patient Position: Sitting, Cuff Size: Adult)   Pulse 78   Temp 98 6 °F (37 °C) (Temporal)   Resp 18   Ht 5' 3" (1 6 m)   Wt 110 kg (242 lb 3 2 oz)   BMI 42 90 kg/m²          Physical Exam   Constitutional: She appears well-developed and well-nourished  HENT:   Right Ear: External ear normal    Left Ear: External ear normal    Mouth/Throat: Oropharynx is clear and moist    Cardiovascular: Normal rate, regular rhythm and normal heart sounds  Pulmonary/Chest: Breath sounds normal    Lymphadenopathy:     She has no cervical adenopathy

## 2018-04-16 ENCOUNTER — TELEPHONE (OUTPATIENT)
Dept: FAMILY MEDICINE CLINIC | Facility: CLINIC | Age: 58
End: 2018-04-16

## 2018-04-18 ENCOUNTER — TRANSCRIBE ORDERS (OUTPATIENT)
Dept: ADMINISTRATIVE | Facility: HOSPITAL | Age: 58
End: 2018-04-18

## 2018-04-18 DIAGNOSIS — H90.3 SENSORY HEARING LOSS, BILATERAL: Primary | ICD-10-CM

## 2018-04-19 ENCOUNTER — TELEPHONE (OUTPATIENT)
Dept: FAMILY MEDICINE CLINIC | Facility: CLINIC | Age: 58
End: 2018-04-19

## 2018-05-07 ENCOUNTER — OFFICE VISIT (OUTPATIENT)
Dept: FAMILY MEDICINE CLINIC | Facility: CLINIC | Age: 58
End: 2018-05-07
Payer: COMMERCIAL

## 2018-05-07 VITALS
WEIGHT: 243 LBS | BODY MASS INDEX: 43.05 KG/M2 | RESPIRATION RATE: 18 BRPM | HEART RATE: 76 BPM | HEIGHT: 63 IN | DIASTOLIC BLOOD PRESSURE: 84 MMHG | SYSTOLIC BLOOD PRESSURE: 124 MMHG

## 2018-05-07 DIAGNOSIS — Z13.9 SCREENING FOR CONDITION: ICD-10-CM

## 2018-05-07 DIAGNOSIS — R42 VERTIGO: ICD-10-CM

## 2018-05-07 DIAGNOSIS — E03.9 HYPOTHYROIDISM, UNSPECIFIED TYPE: Primary | ICD-10-CM

## 2018-05-07 DIAGNOSIS — F98.8 ATTENTION DEFICIT DISORDER (ADD) WITHOUT HYPERACTIVITY: ICD-10-CM

## 2018-05-07 DIAGNOSIS — I10 BENIGN ESSENTIAL HYPERTENSION: ICD-10-CM

## 2018-05-07 PROCEDURE — 99213 OFFICE O/P EST LOW 20 MIN: CPT | Performed by: FAMILY MEDICINE

## 2018-05-07 PROCEDURE — 3074F SYST BP LT 130 MM HG: CPT | Performed by: FAMILY MEDICINE

## 2018-05-07 PROCEDURE — 3079F DIAST BP 80-89 MM HG: CPT | Performed by: FAMILY MEDICINE

## 2018-05-07 RX ORDER — DEXTROAMPHETAMINE SACCHARATE, AMPHETAMINE ASPARTATE, DEXTROAMPHETAMINE SULFATE AND AMPHETAMINE SULFATE 7.5; 7.5; 7.5; 7.5 MG/1; MG/1; MG/1; MG/1
30 TABLET ORAL 2 TIMES DAILY
Qty: 60 TABLET | Refills: 0 | Status: SHIPPED | OUTPATIENT
Start: 2018-05-07 | End: 2018-06-07 | Stop reason: SDUPTHER

## 2018-05-07 RX ORDER — MECLIZINE HCL 12.5 MG/1
12.5 TABLET ORAL EVERY 8 HOURS PRN
Qty: 30 TABLET | Refills: 0 | Status: SHIPPED | OUTPATIENT
Start: 2018-05-07 | End: 2021-07-14

## 2018-05-07 NOTE — PROGRESS NOTES
Assessment/Plan:    Benign essential hypertension  Await lab    Hypothyroidism  Await lab       Diagnoses and all orders for this visit:    Hypothyroidism, unspecified type  -     TSH, 3rd generation; Future  -     Lipid Panel with Direct LDL reflex; Future    Benign essential hypertension  -     Comprehensive metabolic panel; Future  -     CBC and differential; Future    Screening for condition  -     Hepatitis C antibody; Future  -     HIV 1/2 AG-AB combo; Future    Vertigo  -     meclizine (ANTIVERT) 12 5 MG tablet; Take 1 tablet (12 5 mg total) by mouth every 8 (eight) hours as needed for dizziness    Attention deficit disorder (ADD) without hyperactivity  -     amphetamine-dextroamphetamine (ADDERALL) 30 MG tablet; Take 1 tablet (30 mg total) by mouth 2 (two) times a day for 30 days Max Daily Amount: 60 mg          Subjective:      Patient ID: Keny Kaur is a 59 y o  female  Dizziness   This is a chronic problem  The current episode started more than 1 month ago  The problem occurs intermittently  The problem has been waxing and waning  Associated symptoms include vertigo  Pertinent negatives include no chest pain, headaches, nausea, visual change or vomiting  Nothing aggravates the symptoms  Treatments tried: therapy  The treatment provided no relief  The following portions of the patient's history were reviewed and updated as appropriate: allergies, current medications, past family history, past medical history, past social history, past surgical history and problem list     Review of Systems   Constitutional: Negative for unexpected weight change  Respiratory: Negative for shortness of breath  Cardiovascular: Negative for chest pain  Gastrointestinal: Negative for nausea and vomiting  Neurological: Positive for dizziness and vertigo  Negative for headaches           Objective:      /84 (BP Location: Left arm, Patient Position: Sitting, Cuff Size: Adult)   Pulse 76   Resp 18   Ht 5' 3" (1 6 m)   Wt 110 kg (243 lb)   BMI 43 05 kg/m²          Physical Exam   Constitutional: She appears well-developed and well-nourished  Cardiovascular: Normal rate, regular rhythm and normal heart sounds  Musculoskeletal: She exhibits no edema  Lymphadenopathy:     She has no cervical adenopathy

## 2018-05-22 DIAGNOSIS — G47.00 INSOMNIA, UNSPECIFIED TYPE: ICD-10-CM

## 2018-05-22 RX ORDER — CLONAZEPAM 2 MG/1
2 TABLET ORAL DAILY
Qty: 90 TABLET | Refills: 0 | Status: SHIPPED | OUTPATIENT
Start: 2018-05-22 | End: 2018-09-05 | Stop reason: SDUPTHER

## 2018-06-07 ENCOUNTER — TELEPHONE (OUTPATIENT)
Dept: FAMILY MEDICINE CLINIC | Facility: CLINIC | Age: 58
End: 2018-06-07

## 2018-06-07 DIAGNOSIS — F98.8 ATTENTION DEFICIT DISORDER (ADD) WITHOUT HYPERACTIVITY: ICD-10-CM

## 2018-06-07 RX ORDER — DEXTROAMPHETAMINE SACCHARATE, AMPHETAMINE ASPARTATE, DEXTROAMPHETAMINE SULFATE AND AMPHETAMINE SULFATE 7.5; 7.5; 7.5; 7.5 MG/1; MG/1; MG/1; MG/1
30 TABLET ORAL 2 TIMES DAILY
Qty: 60 TABLET | Refills: 0 | Status: SHIPPED | OUTPATIENT
Start: 2018-06-07 | End: 2018-07-03 | Stop reason: SDUPTHER

## 2018-06-12 DIAGNOSIS — E03.9 HYPOTHYROIDISM, UNSPECIFIED TYPE: Primary | ICD-10-CM

## 2018-06-12 RX ORDER — LEVOTHYROXINE SODIUM 0.1 MG/1
TABLET ORAL
Qty: 30 TABLET | Refills: 11 | Status: SHIPPED | OUTPATIENT
Start: 2018-06-12 | End: 2018-08-03 | Stop reason: SDUPTHER

## 2018-06-18 ENCOUNTER — HOSPITAL ENCOUNTER (EMERGENCY)
Facility: HOSPITAL | Age: 58
Discharge: HOME | End: 2018-06-18
Attending: EMERGENCY MEDICINE | Admitting: EMERGENCY MEDICINE
Payer: COMMERCIAL

## 2018-06-18 ENCOUNTER — APPOINTMENT (EMERGENCY)
Dept: RADIOLOGY | Facility: HOSPITAL | Age: 58
End: 2018-06-18
Payer: COMMERCIAL

## 2018-06-18 VITALS
WEIGHT: 240 LBS | SYSTOLIC BLOOD PRESSURE: 148 MMHG | DIASTOLIC BLOOD PRESSURE: 72 MMHG | HEART RATE: 78 BPM | RESPIRATION RATE: 20 BRPM | TEMPERATURE: 98.3 F | OXYGEN SATURATION: 94 % | BODY MASS INDEX: 42.51 KG/M2

## 2018-06-18 DIAGNOSIS — L03.113 CELLULITIS OF RIGHT ELBOW: Primary | ICD-10-CM

## 2018-06-18 PROCEDURE — 90471 IMMUNIZATION ADMIN: CPT

## 2018-06-18 PROCEDURE — 73080 X-RAY EXAM OF ELBOW: CPT

## 2018-06-18 PROCEDURE — 90715 TDAP VACCINE 7 YRS/> IM: CPT | Performed by: EMERGENCY MEDICINE

## 2018-06-18 PROCEDURE — 99283 EMERGENCY DEPT VISIT LOW MDM: CPT

## 2018-06-18 RX ORDER — SULFAMETHOXAZOLE AND TRIMETHOPRIM 800; 160 MG/1; MG/1
1 TABLET ORAL EVERY 12 HOURS SCHEDULED
Qty: 14 TABLET | Refills: 0 | Status: SHIPPED | OUTPATIENT
Start: 2018-06-18 | End: 2018-06-25

## 2018-06-18 RX ORDER — AMLODIPINE BESYLATE 10 MG/1
20 TABLET ORAL DAILY
COMMUNITY
End: 2019-08-05 | Stop reason: SDUPTHER

## 2018-06-18 RX ORDER — TRAMADOL HYDROCHLORIDE 50 MG/1
50 TABLET ORAL EVERY 6 HOURS PRN
Qty: 10 TABLET | Refills: 0 | Status: SHIPPED | OUTPATIENT
Start: 2018-06-18 | End: 2019-05-02 | Stop reason: CLARIF

## 2018-06-18 RX ADMIN — TETANUS TOXOID, REDUCED DIPHTHERIA TOXOID AND ACELLULAR PERTUSSIS VACCINE, ADSORBED 0.5 ML: 5; 2.5; 8; 8; 2.5 SUSPENSION INTRAMUSCULAR at 20:19

## 2018-06-18 NOTE — ED PROVIDER NOTES
History  Chief Complaint   Patient presents with    Elbow Swelling     Injured elbow 1 week ago and now scabbed, pink and swollen     78-year-old female with a history of depression and hypertension presents to the emergency department with increasing right elbow swelling and redness  She states that she fell scraping her right elbow about a week ago  She states over the last 2 days she has noticed increased redness and swelling around the abrasion site and then today some of the scab came off and she noticed some purulent drainage from the area  She has had pain  No fevers or chills  No decreased range of motion  History provided by:  Patient   used: No    Elbow Swelling   Location:  Elbow  Elbow location:  R elbow  Injury: yes    Time since incident:  7 days  Mechanism of injury: fall    Fall:     Fall occurred:  Walking    Impact surface:  [place]  Pain details:     Quality:  Shooting    Radiates to:  Does not radiate    Severity:  Unable to specify    Onset quality:  Gradual    Duration:  1 week    Timing:  Constant    Progression:  Waxing and waning  Dislocation: no    Foreign body present:  Unable to specify  Tetanus status:  Out of date  Prior injury to area:  No  Relieved by:  None tried  Worsened by: Movement  Ineffective treatments:  None tried  Associated symptoms: swelling    Associated symptoms: no back pain, no decreased range of motion, no fatigue, no fever, no numbness, no stiffness and no tingling        Prior to Admission Medications   Prescriptions Last Dose Informant Patient Reported? Taking?    FLUoxetine (PROzac) 20 mg capsule   Yes Yes   Sig: Take 40 mg by mouth daily     LORazepam (ATIVAN) 2 mg tablet   No Yes   Sig: Take 1 tablet (2 mg total) by mouth 4 (four) times a day as needed for anxiety   Pediatric Multivitamins-Fl (MULTIVITAMINS/FL PO) Unknown at Unknown time  Yes No   Sig: Take by mouth   QUEtiapine (SEROquel) 100 mg tablet   No Yes   Sig: Take 1 tablet (100 mg total) by mouth daily at bedtime   amLODIPine (NORVASC) 10 mg tablet   Yes Yes   Sig: Take 20 mg by mouth daily   amphetamine-dextroamphetamine (ADDERALL) 30 MG tablet   No Yes   Sig: Take 1 tablet (30 mg total) by mouth 2 (two) times a day for 30 days Max Daily Amount: 60 mg   clonazePAM (KlonoPIN) 2 mg tablet   No Yes   Sig: Take 1 tablet (2 mg total) by mouth daily   levothyroxine 100 mcg tablet   No Yes   Sig: TAKE ONE TABLET BY MOUTH ONCE DAILY   meclizine (ANTIVERT) 12 5 MG tablet Unknown at Unknown time  No No   Sig: Take 1 tablet (12 5 mg total) by mouth every 8 (eight) hours as needed for dizziness   montelukast (SINGULAIR) 10 mg tablet   Yes Yes   Sig: Take 10 mg by mouth daily at bedtime  Facility-Administered Medications: None       Past Medical History:   Diagnosis Date    ADD (attention deficit disorder)     Anxiety     Arthritis     Asthma     Bipolar 1 disorder (HCC)     Cellulitis     Cellulitis of leg     Depression     Difficulty swallowing     Disease of thyroid gland     hypothyroid    Dysphagia     Elevated lipids     Hearing loss     Hiatal hernia     diaphragmatic    Hyperlipidemia     Hypertension     Psychiatric disorder     Bipolar    Schatzki's ring     Sleep apnea     Wears eyeglasses        Past Surgical History:   Procedure Laterality Date    AMPUTATION      Right little toe    BARIATRIC SURGERY      gastric sleeve    BUNIONECTOMY      CHOLECYSTECTOMY      ESOPHAGEAL DILATION      FOREARM SURGERY Left     FRACTURE REPAIR WITH METAL AND METAL REPLACED    GASTRIC BYPASS      HYSTERECTOMY      JOINT REPLACEMENT      knee    KNEE SURGERY Left     PENILE ADHESIONS LYSIS      onset: 9/24/14    NM COLONOSCOPY FLX DX W/COLLJ SPEC WHEN PFRMD N/A 6/17/2016    Procedure: EGD AND COLONOSCOPY;  Surgeon: Brice Ortega MD;  Location: [facility];   Service: Gastroenterology    NM ESOPHAGOGASTRODUODENOSCOPY TRANSORAL DIAGNOSTIC N/A 4/11/2017 Procedure: ESOPHAGOGASTRODUODENOSCOPY WITH BALLOON DILATATION;  Surgeon: Zach Pérez MD;  Location: [facility]; Service: Gastroenterology    REPLACEMENT TOTAL KNEE      Left Side    ROTATOR CUFF REPAIR Left     SKIN LESION EXCISION      thighs    SLEEVE GASTROPLASTY      gastric sleeve    TONSILLECTOMY         Family History   Problem Relation Age of Onset   Ilir Cabello Breast cancer Mother 62    Lung cancer Mother     Alcohol abuse Father     Substance Abuse Father     Prostate cancer Father 79    Anxiety disorder Brother     Alcohol abuse Brother     Substance Abuse Brother     Mental illness Brother     Hepatitis Brother     Liver cancer Maternal Grandmother 72    Lung cancer Maternal Grandfather     Brain cancer Maternal Uncle     Breast cancer Paternal Aunt 71    Mental illness Family     Lung cancer Maternal Aunt      I have reviewed and agree with the history as documented  Social History   Substance Use Topics    Smoking status: Former Smoker     Quit date: 2011    Smokeless tobacco: Never Used    Alcohol use Yes      Comment: occasionally         Review of Systems   Constitutional: Negative  Negative for chills, diaphoresis, fatigue and fever  HENT: Negative  Negative for congestion, rhinorrhea and sore throat  Eyes: Negative  Negative for discharge, redness and itching  Respiratory: Negative  Negative for apnea, cough, chest tightness, shortness of breath and wheezing  Cardiovascular: Negative for chest pain, palpitations and leg swelling  Gastrointestinal: Negative  Negative for abdominal pain  Endocrine: Negative  Genitourinary: Negative  Negative for flank pain, frequency and urgency  Musculoskeletal: Negative  Negative for back pain and stiffness  Skin: Negative  Allergic/Immunologic: Negative  Neurological: Negative  Negative for dizziness, syncope, weakness, light-headedness, numbness and headaches  Hematological: Negative      All other systems reviewed and are negative  Physical Exam  Physical Exam   Constitutional: She is oriented to person, place, and time  She appears well-developed and well-nourished  Non-toxic appearance  She does not have a sickly appearance  She does not appear ill  No distress  HENT:   Head: Normocephalic and atraumatic  Right Ear: External ear normal    Left Ear: External ear normal    Eyes: Conjunctivae are normal  Pupils are equal, round, and reactive to light  Right eye exhibits no discharge  Left eye exhibits no discharge  Cardiovascular: Normal rate, regular rhythm and normal heart sounds  Exam reveals no gallop and no friction rub  No murmur heard  Pulmonary/Chest: Effort normal and breath sounds normal  No respiratory distress  She has no wheezes  She has no rales  She exhibits no tenderness  Musculoskeletal: Normal range of motion  She exhibits no edema or deformity  Right elbow: She exhibits swelling  She exhibits normal range of motion, no effusion and no deformity  Tenderness found  Olecranon process tenderness noted  Arms:  Neurological: She is alert and oriented to person, place, and time  She has normal reflexes  She exhibits normal muscle tone  Skin: Skin is warm and dry  No rash noted  She is not diaphoretic  No erythema  No pallor  Psychiatric: She has a normal mood and affect  Nursing note and vitals reviewed        Vital Signs  ED Triage Vitals   Temperature Pulse Respirations Blood Pressure SpO2   06/18/18 1913 06/18/18 1913 06/18/18 1913 06/18/18 1913 06/18/18 1913   98 3 °F (36 8 °C) 93 20 165/78 95 %      Temp Source Heart Rate Source Patient Position - Orthostatic VS BP Location FiO2 (%)   06/18/18 1913 06/18/18 2020 06/18/18 2020 06/18/18 2020 --   Oral Monitor Sitting Left arm       Pain Score       06/18/18 1913       Worst Possible Pain           Vitals:    06/18/18 1913 06/18/18 2020   BP: 165/78 148/72   Pulse: 93 78   Patient Position - Orthostatic VS:  Sitting Visual Acuity      ED Medications  Medications   tetanus-diphtheria-acellular pertussis (BOOSTRIX) IM injection 0 5 mL (0 5 mL Intramuscular Given 6/18/18 2019)       Diagnostic Studies  Results Reviewed     None                 XR elbow 3+ views RIGHT   ED Interpretation by Tamera Dumont DO (06/18 2024)   No fracture                 Procedures  Procedures       Phone Contacts  ED Phone Contact    ED Course                               MDM  Number of Diagnoses or Management Options  Diagnosis management comments: 53-year-old female presents with increased swelling and redness over the right elbow  She had fallen a week ago onto concrete and scraped the right elbow  She states over the last 2 days she has noticed some redness and part of the scabbed had fallen off today and she noticed some drainage from the area  No fevers or chills  On exam she appears well and is in no acute distress  She does have erythema to the right elbow and extending a few centimeters proximal and distal   No induration or fluctuance  No effusion  She does have full range of motion of the right elbow  She has a small amount of purulent drainage from the scabbed area  Will do x-ray to rule out fracture/possible radiopaque foreign body  At this time I do not feel she has an acute joint infection as she has full range of motion and no effusion  This is most likely a superficial cellulitis  Will treat with antibiotics, pain medication and have patient return to the emergency department with any worsening symptoms         Amount and/or Complexity of Data Reviewed  Tests in the radiology section of CPT®: ordered and reviewed  Independent visualization of images, tracings, or specimens: yes      CritCare Time    Disposition  Final diagnoses:   Cellulitis of right elbow     Time reflects when diagnosis was documented in both MDM as applicable and the Disposition within this note     Time User Action Codes Description Comment 6/18/2018  8:25 PM Tasha Dominguezrobert SANDOVAL Add [B20 076] Cellulitis of right elbow       ED Disposition     ED Disposition Condition Comment    Discharge  Emmanuel Route discharge to home/self care  Condition at discharge: Good        Follow-up Information     Follow up With Specialties Details Why Sheryl Miller MD Family Medicine Schedule an appointment as soon as possible for a visit in 2 days Or return to the emergency department with any worsening symptoms [phone] [facility] [address]  [phone]            Patient's Medications   Discharge Prescriptions    SULFAMETHOXAZOLE-TRIMETHOPRIM (BACTRIM DS) 800-160 MG PER TABLET    Take 1 tablet by mouth every 12 (twelve) hours for 7 days smx-tmp DS (BACTRIM) 800-160 mg tabs (1tab q12 D10)       Start Date: 6/18/2018 End Date: 6/25/2018       Order Dose: 1 tablet       Quantity: 14 tablet    Refills: 0    TRAMADOL (ULTRAM) 50 MG TABLET    Take 1 tablet (50 mg total) by mouth every 6 (six) hours as needed for moderate pain       Start Date: 6/18/2018 End Date: --       Order Dose: 50 mg       Quantity: 10 tablet    Refills: 0     No discharge procedures on file      ED Provider  Electronically Signed by           Nabeel Contreras DO  06/18/18 2026

## 2018-06-19 NOTE — DISCHARGE INSTRUCTIONS
Cellulitis   WHAT YOU NEED TO KNOW:   Cellulitis is a skin infection caused by bacteria  Cellulitis may go away on its own or you may need treatment  Your healthcare provider may draw a [place] around the outside edges of your cellulitis  If your cellulitis spreads, your healthcare provider will see it outside of the [place]  DISCHARGE INSTRUCTIONS:   Call 911 if:   · You have sudden trouble breathing or chest pain  Return to the emergency department if:   · Your wound gets larger and more painful  · You feel a crackling under your skin when you touch it  · You have purple dots or bumps on your skin, or you see bleeding under your skin  · You have new swelling and pain in your legs  · The red, warm, swollen area gets larger  · You see red streaks coming from the infected area  Contact your healthcare provider if:   · You have a fever  · Your fever or pain does not go away or gets worse  · The area does not get smaller after 2 days of antibiotics  · Your skin is flaking or peeling off  · You have questions or concerns about your condition or care  Medicines:   · Antibiotics  help treat the bacterial infection  · NSAIDs , such as ibuprofen, help decrease swelling, pain, and fever  NSAIDs can cause stomach bleeding or kidney problems in certain people  If you take blood thinner medicine, always ask if NSAIDs are safe for you  Always read the medicine label and follow directions  Do not give these medicines to children under 10months of age without direction from your child's healthcare provider  · Acetaminophen  decreases pain and fever  It is available without a doctor's order  Ask how much to take and how often to take it  Follow directions  Read the labels of all other medicines you are using to see if they also contain acetaminophen, or ask your doctor or pharmacist  Acetaminophen can cause liver damage if not taken correctly   Do not use more than 4 grams (4,000 milligrams) total of acetaminophen in one day  · Take your medicine as directed  Contact your healthcare provider if you think your medicine is not helping or if you have side effects  Tell him or her if you are allergic to any medicine  Keep a list of the medicines, vitamins, and herbs you take  Include the amounts, and when and why you take them  Bring the list or the pill bottles to follow-up visits  Carry your medicine list with you in case of an emergency  Self-care:   · Elevate the area above the level of your heart  as often as you can  This will help decrease swelling and pain  Prop the area on pillows or blankets to keep it elevated comfortably  · Clean the area daily until the wound scabs over  Gently wash the area with soap and water  Pat dry  Use dressings as directed  · Place cool or warm, wet cloths on the area as directed  Use clean cloths and clean water  Leave it on the area until the cloth is room temperature  Pat the area dry with a clean, dry cloth  The cloths may help decrease pain  Prevent cellulitis:   · Do not scratch bug bites or areas of injury  You increase your risk for cellulitis by scratching these areas  · Do not share personal items, such as towels, clothing, and razors  · Clean exercise equipment  with germ-killing  before and after you use it  · Wash your hands often  Use soap and water  Wash your hands after you use the bathroom, change a child's diapers, or sneeze  Wash your hands before you prepare or eat food  Use lotion to prevent dry, cracked skin  · Wear pressure stockings as directed  You may be told to wear the stockings if you have peripheral edema  The stockings improve blood flow and decrease swelling  · Treat athlete's foot  This can help prevent the spread of a bacterial skin infection  Follow up with your healthcare provider within 3 days, or as directed:   Your healthcare provider will check if your cellulitis is getting better  You may need different medicine  Write down your questions so you remember to ask them during your visits  © 2017 [employer] [employer] Information is for End User's use only and may not be sold, redistributed or otherwise used for commercial purposes  All illustrations and images included in CareNotes® are the copyrighted property of A D A M , Inc  or [employer]acio Koehler  The above information is an  only  It is not intended as medical advice for individual conditions or treatments  Talk to your doctor, nurse or pharmacist before following any medical regimen to see if it is safe and effective for you

## 2018-06-21 DIAGNOSIS — F32.A DEPRESSION, UNSPECIFIED DEPRESSION TYPE: Primary | ICD-10-CM

## 2018-06-22 RX ORDER — FLUOXETINE HYDROCHLORIDE 20 MG/1
CAPSULE ORAL
Qty: 180 CAPSULE | Refills: 0 | Status: SHIPPED | OUTPATIENT
Start: 2018-06-22 | End: 2018-09-23 | Stop reason: SDUPTHER

## 2018-07-03 DIAGNOSIS — F98.8 ATTENTION DEFICIT DISORDER (ADD) WITHOUT HYPERACTIVITY: ICD-10-CM

## 2018-07-05 RX ORDER — DEXTROAMPHETAMINE SACCHARATE, AMPHETAMINE ASPARTATE, DEXTROAMPHETAMINE SULFATE AND AMPHETAMINE SULFATE 7.5; 7.5; 7.5; 7.5 MG/1; MG/1; MG/1; MG/1
30 TABLET ORAL 2 TIMES DAILY
Qty: 60 TABLET | Refills: 0 | Status: SHIPPED | OUTPATIENT
Start: 2018-07-05 | End: 2018-08-03 | Stop reason: SDUPTHER

## 2018-07-09 ENCOUNTER — TRANSCRIBE ORDERS (OUTPATIENT)
Dept: ADMINISTRATIVE | Facility: HOSPITAL | Age: 58
End: 2018-07-09

## 2018-07-09 ENCOUNTER — APPOINTMENT (OUTPATIENT)
Dept: LAB | Facility: MEDICAL CENTER | Age: 58
End: 2018-07-09
Payer: COMMERCIAL

## 2018-07-09 DIAGNOSIS — Z01.812 PRE-OPERATIVE LABORATORY EXAMINATION: ICD-10-CM

## 2018-07-09 DIAGNOSIS — Z01.812 PRE-OPERATIVE LABORATORY EXAMINATION: Primary | ICD-10-CM

## 2018-07-09 LAB
BUN SERPL-MCNC: 17 MG/DL (ref 5–25)
CREAT SERPL-MCNC: 0.95 MG/DL (ref 0.6–1.3)
GFR SERPL CREATININE-BSD FRML MDRD: 65 ML/MIN/1.73SQ M

## 2018-07-09 PROCEDURE — 82565 ASSAY OF CREATININE: CPT

## 2018-07-09 PROCEDURE — 84520 ASSAY OF UREA NITROGEN: CPT

## 2018-07-09 PROCEDURE — 36415 COLL VENOUS BLD VENIPUNCTURE: CPT

## 2018-07-23 DIAGNOSIS — F41.9 ANXIETY: ICD-10-CM

## 2018-07-23 RX ORDER — LORAZEPAM 2 MG/1
2 TABLET ORAL 4 TIMES DAILY PRN
Qty: 120 TABLET | Refills: 2 | Status: SHIPPED | OUTPATIENT
Start: 2018-07-23 | End: 2018-07-23 | Stop reason: SDUPTHER

## 2018-07-23 RX ORDER — LORAZEPAM 2 MG/1
2 TABLET ORAL 4 TIMES DAILY PRN
Qty: 120 TABLET | Refills: 0 | OUTPATIENT
Start: 2018-07-23

## 2018-07-23 RX ORDER — LORAZEPAM 2 MG/1
2 TABLET ORAL 4 TIMES DAILY PRN
Qty: 120 TABLET | Refills: 0 | Status: SHIPPED | OUTPATIENT
Start: 2018-07-23 | End: 2018-07-24 | Stop reason: SDUPTHER

## 2018-07-23 NOTE — TELEPHONE ENCOUNTER
patient called in for refills on  LORazepam (ATIVAN) 2 mg tablet  90 days 360 qty   Sent to [facility] on file   Last ov was 05/09/18 with katelin  Please advise

## 2018-07-24 DIAGNOSIS — F41.9 ANXIETY: ICD-10-CM

## 2018-07-24 RX ORDER — LORAZEPAM 2 MG/1
2 TABLET ORAL 4 TIMES DAILY PRN
Qty: 120 TABLET | Refills: 0 | Status: SHIPPED | OUTPATIENT
Start: 2018-07-24 | End: 2018-08-21 | Stop reason: SDUPTHER

## 2018-07-24 NOTE — TELEPHONE ENCOUNTER
Pt called in and stated rx was sent [facility] instead of [facility] pt will like to know if you can resend rx to [facility] on file please advise

## 2018-08-01 ENCOUNTER — APPOINTMENT (OUTPATIENT)
Dept: LAB | Facility: MEDICAL CENTER | Age: 58
End: 2018-08-01
Payer: COMMERCIAL

## 2018-08-01 DIAGNOSIS — Z13.9 SCREENING FOR CONDITION: ICD-10-CM

## 2018-08-01 DIAGNOSIS — E03.9 HYPOTHYROIDISM, UNSPECIFIED TYPE: ICD-10-CM

## 2018-08-01 DIAGNOSIS — I10 BENIGN ESSENTIAL HYPERTENSION: ICD-10-CM

## 2018-08-01 LAB
ALBUMIN SERPL BCP-MCNC: 3.3 G/DL (ref 3.5–5)
ALP SERPL-CCNC: 101 U/L (ref 46–116)
ALT SERPL W P-5'-P-CCNC: 11 U/L (ref 12–78)
ANION GAP SERPL CALCULATED.3IONS-SCNC: 7 MMOL/L (ref 4–13)
AST SERPL W P-5'-P-CCNC: 11 U/L (ref 5–45)
BASOPHILS # BLD AUTO: 0.05 THOUSANDS/ΜL (ref 0–0.1)
BASOPHILS NFR BLD AUTO: 1 % (ref 0–1)
BILIRUB SERPL-MCNC: 0.4 MG/DL (ref 0.2–1)
BUN SERPL-MCNC: 12 MG/DL (ref 5–25)
CALCIUM SERPL-MCNC: 9.4 MG/DL (ref 8.3–10.1)
CHLORIDE SERPL-SCNC: 103 MMOL/L (ref 100–108)
CHOLEST SERPL-MCNC: 230 MG/DL (ref 50–200)
CO2 SERPL-SCNC: 31 MMOL/L (ref 21–32)
CREAT SERPL-MCNC: 0.92 MG/DL (ref 0.6–1.3)
EOSINOPHIL # BLD AUTO: 0.28 THOUSAND/ΜL (ref 0–0.61)
EOSINOPHIL NFR BLD AUTO: 4 % (ref 0–6)
ERYTHROCYTE [DISTWIDTH] IN BLOOD BY AUTOMATED COUNT: 14.6 % (ref 11.6–15.1)
GFR SERPL CREATININE-BSD FRML MDRD: 68 ML/MIN/1.73SQ M
GLUCOSE P FAST SERPL-MCNC: 87 MG/DL (ref 65–99)
HCT VFR BLD AUTO: 42.4 % (ref 34.8–46.1)
HCV AB SER QL: NORMAL
HDLC SERPL-MCNC: 89 MG/DL (ref 40–60)
HGB BLD-MCNC: 12.8 G/DL (ref 11.5–15.4)
IMM GRANULOCYTES # BLD AUTO: 0.02 THOUSAND/UL (ref 0–0.2)
IMM GRANULOCYTES NFR BLD AUTO: 0 % (ref 0–2)
LDLC SERPL CALC-MCNC: 109 MG/DL (ref 0–100)
LYMPHOCYTES # BLD AUTO: 2.09 THOUSANDS/ΜL (ref 0.6–4.47)
LYMPHOCYTES NFR BLD AUTO: 32 % (ref 14–44)
MCH RBC QN AUTO: 27.1 PG (ref 26.8–34.3)
MCHC RBC AUTO-ENTMCNC: 30.2 G/DL (ref 31.4–37.4)
MCV RBC AUTO: 90 FL (ref 82–98)
MONOCYTES # BLD AUTO: 0.59 THOUSAND/ΜL (ref 0.17–1.22)
MONOCYTES NFR BLD AUTO: 9 % (ref 4–12)
NEUTROPHILS # BLD AUTO: 3.44 THOUSANDS/ΜL (ref 1.85–7.62)
NEUTS SEG NFR BLD AUTO: 54 % (ref 43–75)
NRBC BLD AUTO-RTO: 0 /100 WBCS
PLATELET # BLD AUTO: 346 THOUSANDS/UL (ref 149–390)
PMV BLD AUTO: 9.8 FL (ref 8.9–12.7)
POTASSIUM SERPL-SCNC: 4.1 MMOL/L (ref 3.5–5.3)
PROT SERPL-MCNC: 7.6 G/DL (ref 6.4–8.2)
RBC # BLD AUTO: 4.73 MILLION/UL (ref 3.81–5.12)
SODIUM SERPL-SCNC: 141 MMOL/L (ref 136–145)
TRIGL SERPL-MCNC: 159 MG/DL
TSH SERPL DL<=0.05 MIU/L-ACNC: 2.12 UIU/ML (ref 0.36–3.74)
WBC # BLD AUTO: 6.47 THOUSAND/UL (ref 4.31–10.16)

## 2018-08-01 PROCEDURE — 80061 LIPID PANEL: CPT

## 2018-08-01 PROCEDURE — 86803 HEPATITIS C AB TEST: CPT

## 2018-08-01 PROCEDURE — 87389 HIV-1 AG W/HIV-1&-2 AB AG IA: CPT

## 2018-08-01 PROCEDURE — 80053 COMPREHEN METABOLIC PANEL: CPT

## 2018-08-01 PROCEDURE — 36415 COLL VENOUS BLD VENIPUNCTURE: CPT

## 2018-08-01 PROCEDURE — 85025 COMPLETE CBC W/AUTO DIFF WBC: CPT

## 2018-08-01 PROCEDURE — 84443 ASSAY THYROID STIM HORMONE: CPT

## 2018-08-02 DIAGNOSIS — J45.40 MODERATE PERSISTENT ASTHMA WITHOUT COMPLICATION: Primary | ICD-10-CM

## 2018-08-02 DIAGNOSIS — I10 ESSENTIAL HYPERTENSION: Primary | ICD-10-CM

## 2018-08-02 RX ORDER — MONTELUKAST SODIUM 10 MG/1
TABLET ORAL
Qty: 90 TABLET | Refills: 0 | Status: SHIPPED | OUTPATIENT
Start: 2018-08-02 | End: 2018-11-06 | Stop reason: SDUPTHER

## 2018-08-02 RX ORDER — AMLODIPINE BESYLATE 5 MG/1
TABLET ORAL
Qty: 90 TABLET | Refills: 0 | Status: SHIPPED | OUTPATIENT
Start: 2018-08-02 | End: 2018-08-03 | Stop reason: DRUGHIGH

## 2018-08-03 ENCOUNTER — OFFICE VISIT (OUTPATIENT)
Dept: FAMILY MEDICINE CLINIC | Facility: CLINIC | Age: 58
End: 2018-08-03
Payer: COMMERCIAL

## 2018-08-03 VITALS
RESPIRATION RATE: 16 BRPM | TEMPERATURE: 97.1 F | HEART RATE: 82 BPM | BODY MASS INDEX: 43.08 KG/M2 | SYSTOLIC BLOOD PRESSURE: 138 MMHG | DIASTOLIC BLOOD PRESSURE: 90 MMHG | HEIGHT: 63 IN | WEIGHT: 243.13 LBS

## 2018-08-03 DIAGNOSIS — E78.5 HYPERLIPIDEMIA, UNSPECIFIED HYPERLIPIDEMIA TYPE: Primary | ICD-10-CM

## 2018-08-03 DIAGNOSIS — M67.40 GANGLION CYST: ICD-10-CM

## 2018-08-03 DIAGNOSIS — E03.9 HYPOTHYROIDISM, UNSPECIFIED TYPE: ICD-10-CM

## 2018-08-03 DIAGNOSIS — I10 BENIGN ESSENTIAL HYPERTENSION: ICD-10-CM

## 2018-08-03 DIAGNOSIS — R42 DIZZINESS: ICD-10-CM

## 2018-08-03 DIAGNOSIS — F98.8 ATTENTION DEFICIT DISORDER (ADD) WITHOUT HYPERACTIVITY: ICD-10-CM

## 2018-08-03 LAB — HIV 1+2 AB+HIV1 P24 AG SERPL QL IA: NORMAL

## 2018-08-03 PROCEDURE — 99214 OFFICE O/P EST MOD 30 MIN: CPT | Performed by: FAMILY MEDICINE

## 2018-08-03 PROCEDURE — 3008F BODY MASS INDEX DOCD: CPT | Performed by: FAMILY MEDICINE

## 2018-08-03 PROCEDURE — 1036F TOBACCO NON-USER: CPT | Performed by: FAMILY MEDICINE

## 2018-08-03 RX ORDER — DEXTROAMPHETAMINE SACCHARATE, AMPHETAMINE ASPARTATE, DEXTROAMPHETAMINE SULFATE AND AMPHETAMINE SULFATE 7.5; 7.5; 7.5; 7.5 MG/1; MG/1; MG/1; MG/1
30 TABLET ORAL 2 TIMES DAILY
Qty: 60 TABLET | Refills: 0 | Status: SHIPPED | OUTPATIENT
Start: 2018-08-03 | End: 2018-08-29 | Stop reason: SDUPTHER

## 2018-08-03 RX ORDER — LEVOTHYROXINE SODIUM 0.1 MG/1
100 TABLET ORAL DAILY
Qty: 90 TABLET | Refills: 1 | Status: SHIPPED | OUTPATIENT
Start: 2018-08-03 | End: 2018-12-21 | Stop reason: SDUPTHER

## 2018-08-03 RX ORDER — MECLIZINE HCL 12.5 MG/1
12.5 TABLET ORAL EVERY 8 HOURS PRN
Qty: 30 TABLET | Refills: 2 | Status: SHIPPED | OUTPATIENT
Start: 2018-08-03 | End: 2018-12-21 | Stop reason: SDUPTHER

## 2018-08-03 NOTE — PROGRESS NOTES
Assessment/Plan:    No problem-specific Assessment & Plan notes found for this encounter  There are no diagnoses linked to this encounter  Subjective:      Patient ID: Brian Haney is a 59 y o  female  Hypertension   This is a chronic problem  The current episode started more than 1 year ago  The problem is unchanged  The problem is controlled  Associated symptoms include anxiety  Pertinent negatives include no blurred vision, chest pain, headaches, peripheral edema or shortness of breath  There are no associated agents to hypertension  Risk factors for coronary artery disease include obesity, dyslipidemia and smoking/tobacco exposure  Past treatments include calcium channel blockers  The current treatment provides moderate improvement  There are no compliance problems  The following portions of the patient's history were reviewed and updated as appropriate: allergies, current medications, past family history, past medical history, past social history, past surgical history and problem list       Review of Systems   Constitutional: Negative for activity change, appetite change and unexpected weight change  Eyes: Negative for blurred vision  Respiratory: Negative for shortness of breath  Cardiovascular: Negative for chest pain  Neurological: Negative for headaches  Psychiatric/Behavioral: The patient is not nervous/anxious  Objective:      /90 (BP Location: Left arm, Patient Position: Sitting, Cuff Size: Adult)   Pulse 82   Temp (!) 97 1 °F (36 2 °C) (Temporal)   Resp 16   Ht 5' 3 02" (1 601 m)   Wt 110 kg (243 lb 2 oz)   BMI 43 03 kg/m²          Physical Exam   Constitutional: She appears well-developed and well-nourished  Neck: No thyromegaly present  Cardiovascular: Normal rate, regular rhythm and normal heart sounds  Pulmonary/Chest: Breath sounds normal    Musculoskeletal: She exhibits no edema  Lymphadenopathy:     She has no cervical adenopathy  Psychiatric: She has a normal mood and affect  Vitals reviewed

## 2018-08-06 ENCOUNTER — TELEPHONE (OUTPATIENT)
Dept: FAMILY MEDICINE CLINIC | Facility: CLINIC | Age: 58
End: 2018-08-06

## 2018-08-06 NOTE — TELEPHONE ENCOUNTER
----- Message from Liliane Colbert MD sent at 8/5/2018  5:56 PM EDT -----  All lab nl, same meds, lipids,cmp, TSH  6  months

## 2018-08-20 DIAGNOSIS — F41.9 ANXIETY: ICD-10-CM

## 2018-08-21 DIAGNOSIS — F41.9 ANXIETY: ICD-10-CM

## 2018-08-21 RX ORDER — LORAZEPAM 2 MG/1
2 TABLET ORAL 4 TIMES DAILY PRN
Qty: 360 TABLET | Refills: 0 | OUTPATIENT
Start: 2018-08-21 | End: 2018-11-19

## 2018-08-21 RX ORDER — LORAZEPAM 2 MG/1
TABLET ORAL
Qty: 360 TABLET | Refills: 0 | Status: SHIPPED | OUTPATIENT
Start: 2018-08-21 | End: 2018-11-22 | Stop reason: SDUPTHER

## 2018-08-21 NOTE — TELEPHONE ENCOUNTER
Pt father states he confirmed with Nina and pt is due for her refill  Rx was last filled 7/24/18, pt father also stated is for a 90 day supply  Pls advise

## 2018-08-29 DIAGNOSIS — F98.8 ATTENTION DEFICIT DISORDER (ADD) WITHOUT HYPERACTIVITY: ICD-10-CM

## 2018-08-29 NOTE — TELEPHONE ENCOUNTER
Patient's spouse called in requesting medication refill on her Adderall to be sent to [facility]  Per Lorena Barton she will be out of her meds on Monday and he was told Dr Lott Sic will be off on Friday that is why he called today    Last OV was 8/3/2018

## 2018-08-30 DIAGNOSIS — F98.8 ATTENTION DEFICIT DISORDER (ADD) WITHOUT HYPERACTIVITY: ICD-10-CM

## 2018-08-30 RX ORDER — DEXTROAMPHETAMINE SACCHARATE, AMPHETAMINE ASPARTATE, DEXTROAMPHETAMINE SULFATE AND AMPHETAMINE SULFATE 7.5; 7.5; 7.5; 7.5 MG/1; MG/1; MG/1; MG/1
30 TABLET ORAL 2 TIMES DAILY
Qty: 60 TABLET | Refills: 0 | Status: SHIPPED | OUTPATIENT
Start: 2018-08-30 | End: 2018-08-30 | Stop reason: SDUPTHER

## 2018-08-30 RX ORDER — DEXTROAMPHETAMINE SACCHARATE, AMPHETAMINE ASPARTATE, DEXTROAMPHETAMINE SULFATE AND AMPHETAMINE SULFATE 7.5; 7.5; 7.5; 7.5 MG/1; MG/1; MG/1; MG/1
30 TABLET ORAL 2 TIMES DAILY
Qty: 60 TABLET | Refills: 0 | Status: SHIPPED | OUTPATIENT
Start: 2018-08-30 | End: 2018-09-26 | Stop reason: SDUPTHER

## 2018-09-05 DIAGNOSIS — G47.00 INSOMNIA, UNSPECIFIED TYPE: ICD-10-CM

## 2018-09-05 RX ORDER — CLONAZEPAM 2 MG/1
2 TABLET ORAL DAILY
Qty: 90 TABLET | Refills: 0 | Status: SHIPPED | OUTPATIENT
Start: 2018-09-05 | End: 2018-12-04 | Stop reason: SDUPTHER

## 2018-09-05 NOTE — TELEPHONE ENCOUNTER
Pt's  Pieter Anders called in and stated his wife needed an rx refill, I confirmed dose, quantity and pharmacy on file

## 2018-09-13 ENCOUNTER — TELEPHONE (OUTPATIENT)
Dept: FAMILY MEDICINE CLINIC | Facility: CLINIC | Age: 58
End: 2018-09-13

## 2018-09-18 ENCOUNTER — TELEPHONE (OUTPATIENT)
Dept: FAMILY MEDICINE CLINIC | Facility: CLINIC | Age: 58
End: 2018-09-18

## 2018-09-23 DIAGNOSIS — F32.A DEPRESSION, UNSPECIFIED DEPRESSION TYPE: ICD-10-CM

## 2018-09-23 RX ORDER — FLUOXETINE HYDROCHLORIDE 20 MG/1
CAPSULE ORAL
Qty: 180 CAPSULE | Refills: 0 | Status: SHIPPED | OUTPATIENT
Start: 2018-09-23 | End: 2018-12-21 | Stop reason: SDUPTHER

## 2018-09-26 DIAGNOSIS — F31.78 BIPOLAR 1 DISORDER, MIXED, FULL REMISSION (HCC): ICD-10-CM

## 2018-09-26 DIAGNOSIS — F98.8 ATTENTION DEFICIT DISORDER (ADD) WITHOUT HYPERACTIVITY: ICD-10-CM

## 2018-09-26 NOTE — TELEPHONE ENCOUNTER
Patient called in for refill on  QUEtiapine (SEROquel) 100 mg tablet  amphetamine-dextroamphetamine (ADDERALL) 30 MG tablet  [facility]   Last ov was 08/03/18

## 2018-09-27 RX ORDER — DEXTROAMPHETAMINE SACCHARATE, AMPHETAMINE ASPARTATE, DEXTROAMPHETAMINE SULFATE AND AMPHETAMINE SULFATE 7.5; 7.5; 7.5; 7.5 MG/1; MG/1; MG/1; MG/1
30 TABLET ORAL 2 TIMES DAILY
Qty: 60 TABLET | Refills: 0 | Status: SHIPPED | OUTPATIENT
Start: 2018-09-27 | End: 2018-10-23 | Stop reason: SDUPTHER

## 2018-09-27 RX ORDER — QUETIAPINE FUMARATE 100 MG/1
100 TABLET, FILM COATED ORAL
Qty: 90 TABLET | Refills: 0 | Status: SHIPPED | OUTPATIENT
Start: 2018-09-27 | End: 2018-12-21 | Stop reason: SDUPTHER

## 2018-10-09 ENCOUNTER — TELEPHONE (OUTPATIENT)
Dept: FAMILY MEDICINE CLINIC | Facility: CLINIC | Age: 58
End: 2018-10-09

## 2018-10-09 NOTE — TELEPHONE ENCOUNTER
Pt called following up on MRI results, pt states MRI was done Sat 10/6/18  Pt was made aware no results has been received  Pt was made aware that she will reciev a call back as soon as results become available

## 2018-10-23 DIAGNOSIS — F98.8 ATTENTION DEFICIT DISORDER (ADD) WITHOUT HYPERACTIVITY: ICD-10-CM

## 2018-10-26 RX ORDER — DEXTROAMPHETAMINE SACCHARATE, AMPHETAMINE ASPARTATE, DEXTROAMPHETAMINE SULFATE AND AMPHETAMINE SULFATE 7.5; 7.5; 7.5; 7.5 MG/1; MG/1; MG/1; MG/1
30 TABLET ORAL 2 TIMES DAILY
Qty: 60 TABLET | Refills: 0 | Status: SHIPPED | OUTPATIENT
Start: 2018-10-26 | End: 2018-11-22 | Stop reason: SDUPTHER

## 2018-11-06 DIAGNOSIS — J45.40 MODERATE PERSISTENT ASTHMA WITHOUT COMPLICATION: ICD-10-CM

## 2018-11-06 DIAGNOSIS — I10 ESSENTIAL HYPERTENSION: ICD-10-CM

## 2018-11-07 RX ORDER — MONTELUKAST SODIUM 10 MG/1
TABLET ORAL
Qty: 90 TABLET | Refills: 0 | Status: SHIPPED | OUTPATIENT
Start: 2018-11-07 | End: 2019-02-03 | Stop reason: SDUPTHER

## 2018-11-07 RX ORDER — AMLODIPINE BESYLATE 5 MG/1
TABLET ORAL
Qty: 90 TABLET | Refills: 0 | Status: SHIPPED | OUTPATIENT
Start: 2018-11-07 | End: 2019-01-31 | Stop reason: SDUPTHER

## 2018-11-21 DIAGNOSIS — F98.8 ATTENTION DEFICIT DISORDER (ADD) WITHOUT HYPERACTIVITY: ICD-10-CM

## 2018-11-21 DIAGNOSIS — F41.9 ANXIETY: ICD-10-CM

## 2018-11-21 RX ORDER — DEXTROAMPHETAMINE SACCHARATE, AMPHETAMINE ASPARTATE, DEXTROAMPHETAMINE SULFATE AND AMPHETAMINE SULFATE 7.5; 7.5; 7.5; 7.5 MG/1; MG/1; MG/1; MG/1
30 TABLET ORAL 2 TIMES DAILY
Qty: 60 TABLET | Refills: 0 | Status: CANCELLED | OUTPATIENT
Start: 2018-11-21 | End: 2018-12-21

## 2018-11-21 RX ORDER — LORAZEPAM 2 MG/1
TABLET ORAL
Qty: 360 TABLET | Refills: 0 | Status: CANCELLED | OUTPATIENT
Start: 2018-11-21

## 2018-11-21 NOTE — TELEPHONE ENCOUNTER
PATIENT CALLED IN FOR RX   LORazepam (ATIVAN) 2 mg tablet  90 DAYS 360QTY  amphetamine-dextroamphetamine (ADDERALL) 30 MG tablet  30 DAY 60 QTY  SENT TO [facility] ON FILE

## 2018-11-22 DIAGNOSIS — F98.8 ATTENTION DEFICIT DISORDER (ADD) WITHOUT HYPERACTIVITY: ICD-10-CM

## 2018-11-22 DIAGNOSIS — F41.9 ANXIETY: ICD-10-CM

## 2018-11-22 RX ORDER — DEXTROAMPHETAMINE SACCHARATE, AMPHETAMINE ASPARTATE, DEXTROAMPHETAMINE SULFATE AND AMPHETAMINE SULFATE 7.5; 7.5; 7.5; 7.5 MG/1; MG/1; MG/1; MG/1
30 TABLET ORAL 2 TIMES DAILY
Qty: 60 TABLET | Refills: 0 | Status: SHIPPED | OUTPATIENT
Start: 2018-11-22 | End: 2018-12-21 | Stop reason: SDUPTHER

## 2018-11-22 RX ORDER — LORAZEPAM 2 MG/1
2 TABLET ORAL EVERY 6 HOURS PRN
Qty: 360 TABLET | Refills: 0 | Status: SHIPPED | OUTPATIENT
Start: 2018-11-22 | End: 2019-02-18 | Stop reason: SDUPTHER

## 2018-12-04 DIAGNOSIS — G47.00 INSOMNIA, UNSPECIFIED TYPE: ICD-10-CM

## 2018-12-04 RX ORDER — CLONAZEPAM 2 MG/1
2 TABLET ORAL DAILY
Qty: 90 TABLET | Refills: 0 | Status: SHIPPED | OUTPATIENT
Start: 2018-12-04 | End: 2019-03-07 | Stop reason: SDUPTHER

## 2018-12-21 ENCOUNTER — OFFICE VISIT (OUTPATIENT)
Dept: FAMILY MEDICINE CLINIC | Facility: CLINIC | Age: 58
End: 2018-12-21
Payer: COMMERCIAL

## 2018-12-21 VITALS
HEART RATE: 96 BPM | WEIGHT: 241 LBS | BODY MASS INDEX: 42.66 KG/M2 | RESPIRATION RATE: 18 BRPM | TEMPERATURE: 97.5 F | DIASTOLIC BLOOD PRESSURE: 90 MMHG | SYSTOLIC BLOOD PRESSURE: 142 MMHG

## 2018-12-21 DIAGNOSIS — F98.8 ATTENTION DEFICIT DISORDER (ADD) WITHOUT HYPERACTIVITY: ICD-10-CM

## 2018-12-21 DIAGNOSIS — E03.9 HYPOTHYROIDISM, UNSPECIFIED TYPE: ICD-10-CM

## 2018-12-21 DIAGNOSIS — F32.A DEPRESSION, UNSPECIFIED DEPRESSION TYPE: ICD-10-CM

## 2018-12-21 DIAGNOSIS — Z12.39 SCREENING FOR BREAST CANCER: Primary | ICD-10-CM

## 2018-12-21 DIAGNOSIS — F31.78 BIPOLAR 1 DISORDER, MIXED, FULL REMISSION (HCC): ICD-10-CM

## 2018-12-21 PROCEDURE — 99214 OFFICE O/P EST MOD 30 MIN: CPT | Performed by: NURSE PRACTITIONER

## 2018-12-21 PROCEDURE — 1036F TOBACCO NON-USER: CPT | Performed by: NURSE PRACTITIONER

## 2018-12-21 PROCEDURE — 80324 DRUG SCREEN AMPHETAMINES 1/2: CPT | Performed by: NURSE PRACTITIONER

## 2018-12-21 RX ORDER — FLUOXETINE HYDROCHLORIDE 20 MG/1
40 CAPSULE ORAL DAILY
Qty: 180 CAPSULE | Refills: 0 | Status: SHIPPED | OUTPATIENT
Start: 2018-12-21 | End: 2019-03-18 | Stop reason: SDUPTHER

## 2018-12-21 RX ORDER — QUETIAPINE FUMARATE 100 MG/1
100 TABLET, FILM COATED ORAL
Qty: 90 TABLET | Refills: 0 | Status: SHIPPED | OUTPATIENT
Start: 2018-12-21 | End: 2019-03-18 | Stop reason: SDUPTHER

## 2018-12-21 RX ORDER — LEVOTHYROXINE SODIUM 0.1 MG/1
100 TABLET ORAL DAILY
Qty: 90 TABLET | Refills: 0 | Status: SHIPPED | OUTPATIENT
Start: 2018-12-21 | End: 2019-03-18 | Stop reason: SDUPTHER

## 2018-12-21 RX ORDER — DEXTROAMPHETAMINE SACCHARATE, AMPHETAMINE ASPARTATE, DEXTROAMPHETAMINE SULFATE AND AMPHETAMINE SULFATE 7.5; 7.5; 7.5; 7.5 MG/1; MG/1; MG/1; MG/1
30 TABLET ORAL 2 TIMES DAILY
Qty: 60 TABLET | Refills: 0 | Status: SHIPPED | OUTPATIENT
Start: 2018-12-21 | End: 2019-01-16 | Stop reason: SDUPTHER

## 2018-12-21 NOTE — PROGRESS NOTES
Chief Complaint   Patient presents with    Follow-up     Patient present today for a follow up on her Thyroid  Per hema she was told she needed to get BW done and she needs refills on her Levothyroxine which [facility] and adderall seroquel and prozac needs to be sent to [facility]  Assessment/Plan:     Diagnoses and all orders for this visit:    Screening for breast cancer  -     Mammo screening bilateral w 3d & cad; Future    Hypothyroidism, unspecified type  -     levothyroxine 100 mcg tablet; Take 1 tablet (100 mcg total) by mouth daily  -     Lipid panel  -     TSH, 3rd generation with Free T4 reflex; Future  -     Basic metabolic panel    Attention deficit disorder (ADD) without hyperactivity  -     amphetamine-dextroamphetamine (ADDERALL) 30 MG tablet; Take 1 tablet (30 mg total) by mouth 2 (two) times a day for 30 days Max Daily Amount: 60 mg  -     Cancel: URINE,AMPHETAMINE CONFIRMATION  -     URINE,AMPHETAMINE CONFIRMATION    Bipolar 1 disorder, mixed, full remission (HCC)  -     QUEtiapine (SEROquel) 100 mg tablet; Take 1 tablet (100 mg total) by mouth daily at bedtime    Depression, unspecified depression type  -     FLUoxetine (PROzac) 20 mg capsule; Take 2 capsules (40 mg total) by mouth daily          PDMP checked, verified  Prescription request appropriate  Subjective:      Patient ID: Lori Arriaga is a 59 y o  female  HPI    The following portions of the patient's history were reviewed and updated as appropriate: allergies, current medications, past family history, past medical history, past social history, past surgical history and problem list     Review of Systems   Constitutional: Negative for fever  HENT: Negative  Respiratory: Negative for chest tightness and shortness of breath  Cardiovascular: Negative for chest pain and palpitations  Gastrointestinal: Negative for constipation  Psychiatric/Behavioral: Negative for decreased concentration   The patient is not nervous/anxious  Objective:      /90 (BP Location: Left arm, Patient Position: Sitting, Cuff Size: Standard)   Pulse 96   Temp 97 5 °F (36 4 °C) (Temporal)   Resp 18   Wt 109 kg (241 lb)   BMI 42 66 kg/m²          Physical Exam   Constitutional: She is oriented to person, place, and time  She appears well-developed and well-nourished  HENT:   Head: Normocephalic and atraumatic  Neck: No JVD present  Cardiovascular: Normal rate, regular rhythm and normal heart sounds  Pulmonary/Chest: Effort normal and breath sounds normal    Neurological: She is alert and oriented to person, place, and time  Psychiatric: She has a normal mood and affect  Thought content normal    Nursing note and vitals reviewed

## 2018-12-22 DIAGNOSIS — F32.A DEPRESSION, UNSPECIFIED DEPRESSION TYPE: ICD-10-CM

## 2018-12-23 RX ORDER — FLUOXETINE HYDROCHLORIDE 20 MG/1
CAPSULE ORAL
Qty: 180 CAPSULE | Refills: 0 | Status: SHIPPED | OUTPATIENT
Start: 2018-12-23 | End: 2019-03-18 | Stop reason: SDUPTHER

## 2018-12-27 LAB — AMPHET+METHAMPHET UR QL: POSITIVE

## 2018-12-28 ENCOUNTER — TELEPHONE (OUTPATIENT)
Dept: FAMILY MEDICINE CLINIC | Facility: CLINIC | Age: 58
End: 2018-12-28

## 2018-12-28 DIAGNOSIS — J45.41 MODERATE PERSISTENT ASTHMA WITH ACUTE EXACERBATION: Primary | ICD-10-CM

## 2018-12-28 RX ORDER — PREDNISONE 20 MG/1
20 TABLET ORAL 2 TIMES DAILY WITH MEALS
Qty: 10 TABLET | Refills: 0 | Status: SHIPPED | OUTPATIENT
Start: 2018-12-28 | End: 2019-01-02

## 2018-12-28 NOTE — TELEPHONE ENCOUNTER
Patient's spouse called in stating  Rhys Lopez is having an asthma flare up  He will like to know if Dr Rick Everett can send something to her local pharmacy on file  Please advice

## 2019-01-16 DIAGNOSIS — F98.8 ATTENTION DEFICIT DISORDER (ADD) WITHOUT HYPERACTIVITY: ICD-10-CM

## 2019-01-16 NOTE — TELEPHONE ENCOUNTER
Needs refill on:   Adderall 30 mg for a 30 day supply called into [facility]

## 2019-01-18 RX ORDER — DEXTROAMPHETAMINE SACCHARATE, AMPHETAMINE ASPARTATE, DEXTROAMPHETAMINE SULFATE AND AMPHETAMINE SULFATE 7.5; 7.5; 7.5; 7.5 MG/1; MG/1; MG/1; MG/1
30 TABLET ORAL 2 TIMES DAILY
Qty: 60 TABLET | Refills: 0 | Status: SHIPPED | OUTPATIENT
Start: 2019-01-18 | End: 2019-02-14 | Stop reason: SDUPTHER

## 2019-01-31 DIAGNOSIS — I10 ESSENTIAL HYPERTENSION: ICD-10-CM

## 2019-01-31 RX ORDER — AMLODIPINE BESYLATE 5 MG/1
5 TABLET ORAL DAILY
Qty: 90 TABLET | Refills: 1 | Status: SHIPPED | OUTPATIENT
Start: 2019-01-31 | End: 2019-05-02 | Stop reason: SDUPTHER

## 2019-02-03 DIAGNOSIS — J45.40 MODERATE PERSISTENT ASTHMA WITHOUT COMPLICATION: ICD-10-CM

## 2019-02-03 RX ORDER — MONTELUKAST SODIUM 10 MG/1
TABLET ORAL
Qty: 90 TABLET | Refills: 0 | Status: SHIPPED | OUTPATIENT
Start: 2019-02-03 | End: 2019-05-09 | Stop reason: SDUPTHER

## 2019-02-13 ENCOUNTER — TELEPHONE (OUTPATIENT)
Dept: FAMILY MEDICINE CLINIC | Facility: CLINIC | Age: 59
End: 2019-02-13

## 2019-02-13 NOTE — TELEPHONE ENCOUNTER
Pt spouse notified  Pt spouse advises that pharmacy had advise to call a couple of day early for refills

## 2019-02-13 NOTE — TELEPHONE ENCOUNTER
Patient's  called in requesting a refill of her aderall to be sent to [facility]   Thank you

## 2019-02-14 DIAGNOSIS — F98.8 ATTENTION DEFICIT DISORDER (ADD) WITHOUT HYPERACTIVITY: ICD-10-CM

## 2019-02-14 DIAGNOSIS — I10 ESSENTIAL HYPERTENSION: ICD-10-CM

## 2019-02-14 RX ORDER — DEXTROAMPHETAMINE SACCHARATE, AMPHETAMINE ASPARTATE, DEXTROAMPHETAMINE SULFATE AND AMPHETAMINE SULFATE 7.5; 7.5; 7.5; 7.5 MG/1; MG/1; MG/1; MG/1
30 TABLET ORAL 2 TIMES DAILY
Qty: 60 TABLET | Refills: 0 | Status: SHIPPED | OUTPATIENT
Start: 2019-02-14 | End: 2019-03-12 | Stop reason: SDUPTHER

## 2019-02-14 RX ORDER — AMLODIPINE BESYLATE 5 MG/1
TABLET ORAL
Qty: 90 TABLET | Refills: 0 | Status: SHIPPED | OUTPATIENT
Start: 2019-02-14 | End: 2019-05-02 | Stop reason: SDUPTHER

## 2019-02-18 DIAGNOSIS — F41.9 ANXIETY: ICD-10-CM

## 2019-02-18 NOTE — TELEPHONE ENCOUNTER
Pt's  called in requesting a refill on her Lorazepam to be sent to the pharmacy on file    Last ov was: 12/21/2018

## 2019-02-19 ENCOUNTER — TELEPHONE (OUTPATIENT)
Dept: FAMILY MEDICINE CLINIC | Facility: CLINIC | Age: 59
End: 2019-02-19

## 2019-02-19 RX ORDER — LORAZEPAM 2 MG/1
2 TABLET ORAL EVERY 6 HOURS PRN
Qty: 360 TABLET | Refills: 0 | Status: SHIPPED | OUTPATIENT
Start: 2019-02-19 | End: 2019-05-16 | Stop reason: SDUPTHER

## 2019-02-20 NOTE — TELEPHONE ENCOUNTER
[email or web address] Hemostasis: Pressure Render Post-Care Instructions In Note?: no Anesthesia Type: 1% Xylocaine with epinephrine Post-Care Instructions: I reviewed with the patient in detail post-care instructions. Patient is to keep the biopsy site dry overnight, and then apply bacitracin twice daily until healed. Patient may apply hydrogen peroxide soaks to remove any crusting. X Size Of Lesion In Cm (Optional): 0 Wound Care: Aquaphor Anesthesia Volume In Cc: 1 Detail Level: Detailed Home Suture Removal Text: Patient was provided a home suture removal kit and will remove their sutures at home.  If they have any questions or difficulties they will call the office. Biopsy Type: H and E Billing Type: Third-Party Bill Notification Instructions: Patient will be notified of biopsy results. However, patient instructed to call the office if not contacted within 2 weeks. Was A Bandage Applied: Yes Punch Size In Mm: 4 Epidermal Sutures: 4-0 Prolene Consent: Verbal consent was obtained and risks were reviewed including but not limited to scarring, infection, bleeding, scabbing, incomplete removal, nerve damage and allergy to anesthesia. Suture Removal: 7 days Dressing: Band-Aid

## 2019-03-07 ENCOUNTER — HOSPITAL ENCOUNTER (OUTPATIENT)
Dept: RADIOLOGY | Age: 59
Discharge: HOME | End: 2019-03-07
Payer: COMMERCIAL

## 2019-03-07 VITALS — HEIGHT: 63 IN | BODY MASS INDEX: 42.7 KG/M2 | WEIGHT: 241 LBS

## 2019-03-07 DIAGNOSIS — G47.00 INSOMNIA, UNSPECIFIED TYPE: ICD-10-CM

## 2019-03-07 DIAGNOSIS — Z12.39 SCREENING FOR BREAST CANCER: ICD-10-CM

## 2019-03-07 PROCEDURE — 77063 BREAST TOMOSYNTHESIS BI: CPT

## 2019-03-07 PROCEDURE — 77067 SCR MAMMO BI INCL CAD: CPT

## 2019-03-07 RX ORDER — CLONAZEPAM 2 MG/1
2 TABLET ORAL DAILY
Qty: 90 TABLET | Refills: 0 | Status: SHIPPED | OUTPATIENT
Start: 2019-03-07 | End: 2019-06-06 | Stop reason: SDUPTHER

## 2019-03-07 NOTE — TELEPHONE ENCOUNTER
Pt spouse called requesting med refill for her clonazepam 90 day supply to be sent to [facility]

## 2019-03-12 DIAGNOSIS — F98.8 ATTENTION DEFICIT DISORDER (ADD) WITHOUT HYPERACTIVITY: ICD-10-CM

## 2019-03-13 RX ORDER — DEXTROAMPHETAMINE SACCHARATE, AMPHETAMINE ASPARTATE, DEXTROAMPHETAMINE SULFATE AND AMPHETAMINE SULFATE 7.5; 7.5; 7.5; 7.5 MG/1; MG/1; MG/1; MG/1
30 TABLET ORAL 2 TIMES DAILY
Qty: 60 TABLET | Refills: 0 | Status: SHIPPED | OUTPATIENT
Start: 2019-03-13 | End: 2019-04-11 | Stop reason: SDUPTHER

## 2019-03-18 DIAGNOSIS — F32.A DEPRESSION, UNSPECIFIED DEPRESSION TYPE: ICD-10-CM

## 2019-03-18 DIAGNOSIS — E03.9 HYPOTHYROIDISM, UNSPECIFIED TYPE: ICD-10-CM

## 2019-03-18 DIAGNOSIS — F31.78 BIPOLAR 1 DISORDER, MIXED, FULL REMISSION (HCC): ICD-10-CM

## 2019-03-18 RX ORDER — LEVOTHYROXINE SODIUM 0.1 MG/1
100 TABLET ORAL DAILY
Qty: 90 TABLET | Refills: 0 | Status: SHIPPED | OUTPATIENT
Start: 2019-03-18 | End: 2019-04-23 | Stop reason: SDUPTHER

## 2019-03-18 RX ORDER — QUETIAPINE FUMARATE 100 MG/1
100 TABLET, FILM COATED ORAL
Qty: 90 TABLET | Refills: 0 | Status: SHIPPED | OUTPATIENT
Start: 2019-03-18 | End: 2019-06-14 | Stop reason: SDUPTHER

## 2019-03-18 RX ORDER — FLUOXETINE HYDROCHLORIDE 20 MG/1
CAPSULE ORAL
Qty: 180 CAPSULE | Refills: 0 | Status: SHIPPED | OUTPATIENT
Start: 2019-03-18 | End: 2019-09-24 | Stop reason: SDUPTHER

## 2019-03-19 DIAGNOSIS — F31.78 BIPOLAR 1 DISORDER, MIXED, FULL REMISSION (HCC): ICD-10-CM

## 2019-03-20 RX ORDER — QUETIAPINE FUMARATE 100 MG/1
TABLET, FILM COATED ORAL
Qty: 90 TABLET | Refills: 0 | OUTPATIENT
Start: 2019-03-20

## 2019-04-08 DIAGNOSIS — F98.8 ATTENTION DEFICIT DISORDER (ADD) WITHOUT HYPERACTIVITY: ICD-10-CM

## 2019-04-08 RX ORDER — DEXTROAMPHETAMINE SACCHARATE, AMPHETAMINE ASPARTATE, DEXTROAMPHETAMINE SULFATE AND AMPHETAMINE SULFATE 7.5; 7.5; 7.5; 7.5 MG/1; MG/1; MG/1; MG/1
30 TABLET ORAL 2 TIMES DAILY
Qty: 60 TABLET | Refills: 0 | OUTPATIENT
Start: 2019-04-08 | End: 2019-05-08

## 2019-04-11 DIAGNOSIS — F98.8 ATTENTION DEFICIT DISORDER (ADD) WITHOUT HYPERACTIVITY: ICD-10-CM

## 2019-04-11 RX ORDER — DEXTROAMPHETAMINE SACCHARATE, AMPHETAMINE ASPARTATE, DEXTROAMPHETAMINE SULFATE AND AMPHETAMINE SULFATE 7.5; 7.5; 7.5; 7.5 MG/1; MG/1; MG/1; MG/1
30 TABLET ORAL 2 TIMES DAILY
Qty: 60 TABLET | Refills: 0 | Status: SHIPPED | OUTPATIENT
Start: 2019-04-11 | End: 2019-05-09 | Stop reason: SDUPTHER

## 2019-04-23 DIAGNOSIS — E03.9 HYPOTHYROIDISM, UNSPECIFIED TYPE: ICD-10-CM

## 2019-04-23 RX ORDER — LEVOTHYROXINE SODIUM 0.1 MG/1
TABLET ORAL
Qty: 90 TABLET | Refills: 0 | Status: SHIPPED | OUTPATIENT
Start: 2019-04-23 | End: 2019-04-24 | Stop reason: SDUPTHER

## 2019-04-24 DIAGNOSIS — E03.9 HYPOTHYROIDISM, UNSPECIFIED TYPE: ICD-10-CM

## 2019-04-24 RX ORDER — LEVOTHYROXINE SODIUM 0.1 MG/1
100 TABLET ORAL DAILY
Qty: 90 TABLET | Refills: 1 | Status: SHIPPED | OUTPATIENT
Start: 2019-04-24 | End: 2019-07-16 | Stop reason: SDUPTHER

## 2019-04-25 RX ORDER — LEVOTHYROXINE SODIUM 0.1 MG/1
TABLET ORAL
Qty: 90 TABLET | Refills: 0 | OUTPATIENT
Start: 2019-04-25

## 2019-05-01 ENCOUNTER — APPOINTMENT (OUTPATIENT)
Dept: LAB | Facility: MEDICAL CENTER | Age: 59
End: 2019-05-01
Payer: COMMERCIAL

## 2019-05-01 DIAGNOSIS — E03.9 HYPOTHYROIDISM, UNSPECIFIED TYPE: ICD-10-CM

## 2019-05-01 LAB
ANION GAP SERPL CALCULATED.3IONS-SCNC: 4 MMOL/L (ref 4–13)
BUN SERPL-MCNC: 14 MG/DL (ref 5–25)
CALCIUM SERPL-MCNC: 8.5 MG/DL (ref 8.3–10.1)
CHLORIDE SERPL-SCNC: 109 MMOL/L (ref 100–108)
CHOLEST SERPL-MCNC: 206 MG/DL (ref 50–200)
CO2 SERPL-SCNC: 30 MMOL/L (ref 21–32)
CREAT SERPL-MCNC: 0.96 MG/DL (ref 0.6–1.3)
GFR SERPL CREATININE-BSD FRML MDRD: 64 ML/MIN/1.73SQ M
GLUCOSE P FAST SERPL-MCNC: 87 MG/DL (ref 65–99)
HDLC SERPL-MCNC: 88 MG/DL (ref 40–60)
LDLC SERPL CALC-MCNC: 101 MG/DL (ref 0–100)
NONHDLC SERPL-MCNC: 118 MG/DL
POTASSIUM SERPL-SCNC: 4.4 MMOL/L (ref 3.5–5.3)
SODIUM SERPL-SCNC: 143 MMOL/L (ref 136–145)
TRIGL SERPL-MCNC: 85 MG/DL
TSH SERPL DL<=0.05 MIU/L-ACNC: 0.62 UIU/ML (ref 0.36–3.74)

## 2019-05-01 PROCEDURE — 80048 BASIC METABOLIC PNL TOTAL CA: CPT | Performed by: NURSE PRACTITIONER

## 2019-05-01 PROCEDURE — 84443 ASSAY THYROID STIM HORMONE: CPT

## 2019-05-01 PROCEDURE — 36415 COLL VENOUS BLD VENIPUNCTURE: CPT | Performed by: NURSE PRACTITIONER

## 2019-05-01 PROCEDURE — 80061 LIPID PANEL: CPT | Performed by: NURSE PRACTITIONER

## 2019-05-02 ENCOUNTER — OFFICE VISIT (OUTPATIENT)
Dept: FAMILY MEDICINE CLINIC | Facility: CLINIC | Age: 59
End: 2019-05-02
Payer: COMMERCIAL

## 2019-05-02 VITALS
RESPIRATION RATE: 18 BRPM | DIASTOLIC BLOOD PRESSURE: 86 MMHG | BODY MASS INDEX: 45.18 KG/M2 | SYSTOLIC BLOOD PRESSURE: 140 MMHG | WEIGHT: 255 LBS | HEIGHT: 63 IN | HEART RATE: 104 BPM | TEMPERATURE: 97.8 F

## 2019-05-02 DIAGNOSIS — Z00.00 MEDICARE ANNUAL WELLNESS VISIT, INITIAL: ICD-10-CM

## 2019-05-02 DIAGNOSIS — I10 BENIGN ESSENTIAL HYPERTENSION: ICD-10-CM

## 2019-05-02 DIAGNOSIS — E66.01 MORBID OBESITY WITH BMI OF 45.0-49.9, ADULT (HCC): ICD-10-CM

## 2019-05-02 DIAGNOSIS — Z87.891 HISTORY OF TOBACCO ABUSE: ICD-10-CM

## 2019-05-02 DIAGNOSIS — Z78.0 POSTMENOPAUSAL: Primary | ICD-10-CM

## 2019-05-02 PROCEDURE — G0439 PPPS, SUBSEQ VISIT: HCPCS | Performed by: FAMILY MEDICINE

## 2019-05-02 PROCEDURE — 99214 OFFICE O/P EST MOD 30 MIN: CPT | Performed by: FAMILY MEDICINE

## 2019-05-03 ENCOUNTER — TELEPHONE (OUTPATIENT)
Dept: FAMILY MEDICINE CLINIC | Facility: CLINIC | Age: 59
End: 2019-05-03

## 2019-05-09 DIAGNOSIS — F98.8 ATTENTION DEFICIT DISORDER (ADD) WITHOUT HYPERACTIVITY: ICD-10-CM

## 2019-05-09 DIAGNOSIS — J45.40 MODERATE PERSISTENT ASTHMA WITHOUT COMPLICATION: ICD-10-CM

## 2019-05-09 RX ORDER — DEXTROAMPHETAMINE SACCHARATE, AMPHETAMINE ASPARTATE, DEXTROAMPHETAMINE SULFATE AND AMPHETAMINE SULFATE 7.5; 7.5; 7.5; 7.5 MG/1; MG/1; MG/1; MG/1
30 TABLET ORAL 2 TIMES DAILY
Qty: 60 TABLET | Refills: 0 | Status: SHIPPED | OUTPATIENT
Start: 2019-05-09 | End: 2019-05-10 | Stop reason: SDUPTHER

## 2019-05-09 RX ORDER — MONTELUKAST SODIUM 10 MG/1
TABLET ORAL
Qty: 90 TABLET | Refills: 1 | Status: SHIPPED | OUTPATIENT
Start: 2019-05-09 | End: 2019-08-05 | Stop reason: SDUPTHER

## 2019-05-10 DIAGNOSIS — F98.8 ATTENTION DEFICIT DISORDER (ADD) WITHOUT HYPERACTIVITY: ICD-10-CM

## 2019-05-10 RX ORDER — DEXTROAMPHETAMINE SACCHARATE, AMPHETAMINE ASPARTATE, DEXTROAMPHETAMINE SULFATE AND AMPHETAMINE SULFATE 7.5; 7.5; 7.5; 7.5 MG/1; MG/1; MG/1; MG/1
30 TABLET ORAL 2 TIMES DAILY
Qty: 60 TABLET | Refills: 0 | Status: SHIPPED | OUTPATIENT
Start: 2019-05-10 | End: 2019-06-10 | Stop reason: SDUPTHER

## 2019-05-10 RX ORDER — DEXTROAMPHETAMINE SACCHARATE, AMPHETAMINE ASPARTATE, DEXTROAMPHETAMINE SULFATE AND AMPHETAMINE SULFATE 7.5; 7.5; 7.5; 7.5 MG/1; MG/1; MG/1; MG/1
30 TABLET ORAL 2 TIMES DAILY
Qty: 60 TABLET | Refills: 0 | OUTPATIENT
Start: 2019-05-10 | End: 2019-06-09

## 2019-05-16 DIAGNOSIS — F41.9 ANXIETY: ICD-10-CM

## 2019-05-17 ENCOUNTER — TELEPHONE (OUTPATIENT)
Dept: FAMILY MEDICINE CLINIC | Facility: CLINIC | Age: 59
End: 2019-05-17

## 2019-05-17 RX ORDER — LORAZEPAM 2 MG/1
2 TABLET ORAL EVERY 6 HOURS PRN
Qty: 360 TABLET | Refills: 0 | Status: SHIPPED | OUTPATIENT
Start: 2019-05-17 | End: 2019-08-15 | Stop reason: SDUPTHER

## 2019-06-06 DIAGNOSIS — G47.00 INSOMNIA, UNSPECIFIED TYPE: ICD-10-CM

## 2019-06-06 DIAGNOSIS — F98.8 ATTENTION DEFICIT DISORDER (ADD) WITHOUT HYPERACTIVITY: ICD-10-CM

## 2019-06-06 RX ORDER — CLONAZEPAM 2 MG/1
2 TABLET ORAL DAILY
Qty: 90 TABLET | Refills: 0 | Status: SHIPPED | OUTPATIENT
Start: 2019-06-06 | End: 2019-08-15 | Stop reason: SDUPTHER

## 2019-06-06 RX ORDER — DEXTROAMPHETAMINE SACCHARATE, AMPHETAMINE ASPARTATE, DEXTROAMPHETAMINE SULFATE AND AMPHETAMINE SULFATE 7.5; 7.5; 7.5; 7.5 MG/1; MG/1; MG/1; MG/1
30 TABLET ORAL 2 TIMES DAILY
Qty: 60 TABLET | Refills: 0 | OUTPATIENT
Start: 2019-06-06 | End: 2019-07-06

## 2019-06-06 RX ORDER — CLONAZEPAM 2 MG/1
2 TABLET ORAL DAILY
Qty: 90 TABLET | Refills: 0 | OUTPATIENT
Start: 2019-06-06

## 2019-06-10 ENCOUNTER — TELEPHONE (OUTPATIENT)
Dept: FAMILY MEDICINE CLINIC | Facility: CLINIC | Age: 59
End: 2019-06-10

## 2019-06-10 DIAGNOSIS — F98.8 ATTENTION DEFICIT DISORDER (ADD) WITHOUT HYPERACTIVITY: ICD-10-CM

## 2019-06-10 RX ORDER — DEXTROAMPHETAMINE SACCHARATE, AMPHETAMINE ASPARTATE, DEXTROAMPHETAMINE SULFATE AND AMPHETAMINE SULFATE 7.5; 7.5; 7.5; 7.5 MG/1; MG/1; MG/1; MG/1
30 TABLET ORAL 2 TIMES DAILY
Qty: 60 TABLET | Refills: 0 | Status: SHIPPED | OUTPATIENT
Start: 2019-06-10 | End: 2019-07-08 | Stop reason: SDUPTHER

## 2019-06-14 DIAGNOSIS — F31.78 BIPOLAR 1 DISORDER, MIXED, FULL REMISSION (HCC): ICD-10-CM

## 2019-06-14 RX ORDER — QUETIAPINE FUMARATE 100 MG/1
TABLET, FILM COATED ORAL
Qty: 90 TABLET | Refills: 0 | Status: SHIPPED | OUTPATIENT
Start: 2019-06-14 | End: 2019-08-15 | Stop reason: SDUPTHER

## 2019-06-15 DIAGNOSIS — F32.A DEPRESSION, UNSPECIFIED DEPRESSION TYPE: ICD-10-CM

## 2019-06-16 RX ORDER — FLUOXETINE HYDROCHLORIDE 20 MG/1
CAPSULE ORAL
Qty: 180 CAPSULE | Refills: 0 | Status: SHIPPED | OUTPATIENT
Start: 2019-06-16 | End: 2019-08-05 | Stop reason: SDUPTHER

## 2019-07-08 DIAGNOSIS — F98.8 ATTENTION DEFICIT DISORDER (ADD) WITHOUT HYPERACTIVITY: ICD-10-CM

## 2019-07-08 RX ORDER — DEXTROAMPHETAMINE SACCHARATE, AMPHETAMINE ASPARTATE, DEXTROAMPHETAMINE SULFATE AND AMPHETAMINE SULFATE 7.5; 7.5; 7.5; 7.5 MG/1; MG/1; MG/1; MG/1
30 TABLET ORAL 2 TIMES DAILY
Qty: 60 TABLET | Refills: 0 | Status: SHIPPED | OUTPATIENT
Start: 2019-07-08 | End: 2019-08-05 | Stop reason: SDUPTHER

## 2019-07-16 DIAGNOSIS — E03.9 HYPOTHYROIDISM, UNSPECIFIED TYPE: ICD-10-CM

## 2019-07-16 RX ORDER — LEVOTHYROXINE SODIUM 0.1 MG/1
100 TABLET ORAL DAILY
Qty: 90 TABLET | Refills: 1 | Status: SHIPPED | OUTPATIENT
Start: 2019-07-16 | End: 2019-08-15 | Stop reason: SDUPTHER

## 2019-07-16 NOTE — TELEPHONE ENCOUNTER
Pt requested refill on her levothyroxine to be [facility] [address]

## 2019-08-05 DIAGNOSIS — F41.9 ANXIETY: ICD-10-CM

## 2019-08-05 DIAGNOSIS — F98.8 ATTENTION DEFICIT DISORDER (ADD) WITHOUT HYPERACTIVITY: ICD-10-CM

## 2019-08-05 DIAGNOSIS — I10 BENIGN ESSENTIAL HYPERTENSION: Primary | ICD-10-CM

## 2019-08-05 DIAGNOSIS — F32.A DEPRESSION, UNSPECIFIED DEPRESSION TYPE: ICD-10-CM

## 2019-08-05 DIAGNOSIS — J45.40 MODERATE PERSISTENT ASTHMA WITHOUT COMPLICATION: ICD-10-CM

## 2019-08-05 RX ORDER — AMLODIPINE BESYLATE 10 MG/1
20 TABLET ORAL DAILY
Qty: 180 TABLET | Refills: 3 | OUTPATIENT
Start: 2019-08-05

## 2019-08-05 RX ORDER — DEXTROAMPHETAMINE SACCHARATE, AMPHETAMINE ASPARTATE, DEXTROAMPHETAMINE SULFATE AND AMPHETAMINE SULFATE 7.5; 7.5; 7.5; 7.5 MG/1; MG/1; MG/1; MG/1
30 TABLET ORAL 2 TIMES DAILY
Qty: 60 TABLET | Refills: 0 | OUTPATIENT
Start: 2019-08-05 | End: 2019-09-04

## 2019-08-05 RX ORDER — LORAZEPAM 2 MG/1
2 TABLET ORAL EVERY 6 HOURS PRN
Qty: 360 TABLET | Refills: 0 | OUTPATIENT
Start: 2019-08-05

## 2019-08-05 RX ORDER — MONTELUKAST SODIUM 10 MG/1
10 TABLET ORAL DAILY
Qty: 90 TABLET | Refills: 0 | Status: SHIPPED | OUTPATIENT
Start: 2019-08-05 | End: 2019-08-15 | Stop reason: SDUPTHER

## 2019-08-05 RX ORDER — MONTELUKAST SODIUM 10 MG/1
10 TABLET ORAL DAILY
Qty: 90 TABLET | Refills: 2 | OUTPATIENT
Start: 2019-08-05

## 2019-08-05 RX ORDER — DEXTROAMPHETAMINE SACCHARATE, AMPHETAMINE ASPARTATE, DEXTROAMPHETAMINE SULFATE AND AMPHETAMINE SULFATE 7.5; 7.5; 7.5; 7.5 MG/1; MG/1; MG/1; MG/1
30 TABLET ORAL 2 TIMES DAILY
Qty: 60 TABLET | Refills: 0 | Status: SHIPPED | OUTPATIENT
Start: 2019-08-05 | End: 2019-09-03 | Stop reason: SDUPTHER

## 2019-08-05 RX ORDER — AMLODIPINE BESYLATE 10 MG/1
20 TABLET ORAL DAILY
Qty: 180 TABLET | Refills: 0 | Status: SHIPPED | OUTPATIENT
Start: 2019-08-05 | End: 2019-08-15 | Stop reason: SDUPTHER

## 2019-08-05 RX ORDER — FLUOXETINE HYDROCHLORIDE 20 MG/1
40 CAPSULE ORAL DAILY
Qty: 180 CAPSULE | Refills: 2 | OUTPATIENT
Start: 2019-08-05

## 2019-08-05 RX ORDER — FLUOXETINE HYDROCHLORIDE 20 MG/1
40 CAPSULE ORAL DAILY
Qty: 180 CAPSULE | Refills: 0 | Status: SHIPPED | OUTPATIENT
Start: 2019-08-05 | End: 2019-08-15 | Stop reason: SDUPTHER

## 2019-08-09 ENCOUNTER — HOSPITAL ENCOUNTER (OUTPATIENT)
Dept: BONE DENSITY | Facility: MEDICAL CENTER | Age: 59
Discharge: HOME | End: 2019-08-09
Payer: COMMERCIAL

## 2019-08-09 DIAGNOSIS — Z78.0 POSTMENOPAUSAL: ICD-10-CM

## 2019-08-09 PROCEDURE — 77080 DXA BONE DENSITY AXIAL: CPT

## 2019-08-10 ENCOUNTER — HOSPITAL ENCOUNTER (OUTPATIENT)
Dept: CT IMAGING | Facility: HOSPITAL | Age: 59
Discharge: HOME | End: 2019-08-10
Payer: COMMERCIAL

## 2019-08-10 DIAGNOSIS — Z87.891 HISTORY OF TOBACCO ABUSE: ICD-10-CM

## 2019-08-10 PROCEDURE — G0297 LDCT FOR LUNG CA SCREEN: HCPCS

## 2019-08-15 DIAGNOSIS — I10 BENIGN ESSENTIAL HYPERTENSION: ICD-10-CM

## 2019-08-15 DIAGNOSIS — J45.40 MODERATE PERSISTENT ASTHMA WITHOUT COMPLICATION: ICD-10-CM

## 2019-08-15 DIAGNOSIS — G47.00 INSOMNIA, UNSPECIFIED TYPE: ICD-10-CM

## 2019-08-15 DIAGNOSIS — E03.9 HYPOTHYROIDISM, UNSPECIFIED TYPE: ICD-10-CM

## 2019-08-15 DIAGNOSIS — F31.78 BIPOLAR 1 DISORDER, MIXED, FULL REMISSION (HCC): ICD-10-CM

## 2019-08-15 DIAGNOSIS — F41.9 ANXIETY: ICD-10-CM

## 2019-08-15 DIAGNOSIS — F32.A DEPRESSION, UNSPECIFIED DEPRESSION TYPE: ICD-10-CM

## 2019-08-15 RX ORDER — CLONAZEPAM 2 MG/1
2 TABLET ORAL DAILY
Qty: 90 TABLET | Refills: 1 | Status: SHIPPED | OUTPATIENT
Start: 2019-08-15 | End: 2019-09-24 | Stop reason: CLARIF

## 2019-08-15 RX ORDER — QUETIAPINE FUMARATE 100 MG/1
100 TABLET, FILM COATED ORAL
Qty: 90 TABLET | Refills: 3 | Status: SHIPPED | OUTPATIENT
Start: 2019-08-15 | End: 2020-04-26

## 2019-08-15 RX ORDER — AMLODIPINE BESYLATE 10 MG/1
10 TABLET ORAL DAILY
Qty: 90 TABLET | Refills: 1 | Status: SHIPPED | OUTPATIENT
Start: 2019-08-15 | End: 2019-08-15 | Stop reason: SDUPTHER

## 2019-08-15 RX ORDER — MONTELUKAST SODIUM 10 MG/1
10 TABLET ORAL DAILY
Qty: 90 TABLET | Refills: 1 | Status: SHIPPED | OUTPATIENT
Start: 2019-08-15 | End: 2019-08-15 | Stop reason: SDUPTHER

## 2019-08-15 RX ORDER — FLUOXETINE HYDROCHLORIDE 20 MG/1
40 CAPSULE ORAL DAILY
Qty: 180 CAPSULE | Refills: 1 | Status: SHIPPED | OUTPATIENT
Start: 2019-08-15 | End: 2019-08-15 | Stop reason: SDUPTHER

## 2019-08-15 RX ORDER — LEVOTHYROXINE SODIUM 0.1 MG/1
100 TABLET ORAL DAILY
Qty: 90 TABLET | Refills: 1 | Status: SHIPPED | OUTPATIENT
Start: 2019-08-15 | End: 2019-08-15 | Stop reason: SDUPTHER

## 2019-08-15 RX ORDER — MONTELUKAST SODIUM 10 MG/1
10 TABLET ORAL DAILY
Qty: 90 TABLET | Refills: 3 | Status: SHIPPED | OUTPATIENT
Start: 2019-08-15 | End: 2020-03-20

## 2019-08-15 RX ORDER — QUETIAPINE FUMARATE 100 MG/1
100 TABLET, FILM COATED ORAL
Qty: 90 TABLET | Refills: 1 | Status: SHIPPED | OUTPATIENT
Start: 2019-08-15 | End: 2019-08-15 | Stop reason: SDUPTHER

## 2019-08-15 RX ORDER — AMLODIPINE BESYLATE 10 MG/1
10 TABLET ORAL DAILY
Qty: 90 TABLET | Refills: 3 | Status: SHIPPED | OUTPATIENT
Start: 2019-08-15 | End: 2020-07-24 | Stop reason: SDUPTHER

## 2019-08-15 RX ORDER — LORAZEPAM 2 MG/1
2 TABLET ORAL EVERY 6 HOURS PRN
Qty: 360 TABLET | Refills: 0 | Status: SHIPPED | OUTPATIENT
Start: 2019-08-15 | End: 2019-08-15 | Stop reason: SDUPTHER

## 2019-08-15 RX ORDER — CLONAZEPAM 2 MG/1
2 TABLET ORAL DAILY
Qty: 90 TABLET | Refills: 1 | Status: SHIPPED | OUTPATIENT
Start: 2019-08-15 | End: 2019-08-15 | Stop reason: SDUPTHER

## 2019-08-15 RX ORDER — LORAZEPAM 2 MG/1
2 TABLET ORAL EVERY 6 HOURS PRN
Qty: 360 TABLET | Refills: 0 | Status: SHIPPED | OUTPATIENT
Start: 2019-08-15 | End: 2019-08-16 | Stop reason: SDUPTHER

## 2019-08-15 RX ORDER — FLUOXETINE HYDROCHLORIDE 20 MG/1
40 CAPSULE ORAL DAILY
Qty: 180 CAPSULE | Refills: 1 | Status: SHIPPED | OUTPATIENT
Start: 2019-08-15 | End: 2020-04-26

## 2019-08-15 RX ORDER — LEVOTHYROXINE SODIUM 0.1 MG/1
100 TABLET ORAL DAILY
Qty: 90 TABLET | Refills: 3 | Status: SHIPPED | OUTPATIENT
Start: 2019-08-15 | End: 2020-04-26

## 2019-08-15 NOTE — TELEPHONE ENCOUNTER
Pt is now going to be using [facility] for all of her medications  Can new scripts be sent there for the following  90 day supply with refills she is requesting  1  Clonazepam  2  Amlodipine  3  Fluoxetine  4  Montelukast  5  Levothyroxine  6  Quetiapine  7  Lorazepam  Thank you!

## 2019-08-16 DIAGNOSIS — F41.9 ANXIETY: ICD-10-CM

## 2019-08-16 RX ORDER — LORAZEPAM 2 MG/1
2 TABLET ORAL EVERY 6 HOURS PRN
Qty: 28 TABLET | Refills: 0 | Status: SHIPPED | OUTPATIENT
Start: 2019-08-16 | End: 2019-08-26 | Stop reason: SDUPTHER

## 2019-08-23 ENCOUNTER — TELEPHONE (OUTPATIENT)
Dept: FAMILY MEDICINE CLINIC | Facility: CLINIC | Age: 59
End: 2019-08-23

## 2019-08-23 DIAGNOSIS — G47.00 INSOMNIA, UNSPECIFIED TYPE: ICD-10-CM

## 2019-08-24 RX ORDER — CLONAZEPAM 2 MG/1
TABLET ORAL
Qty: 90 TABLET | Refills: 0 | Status: SHIPPED | OUTPATIENT
Start: 2019-08-24 | End: 2019-08-26 | Stop reason: SDUPTHER

## 2019-08-26 DIAGNOSIS — F98.8 ATTENTION DEFICIT DISORDER (ADD) WITHOUT HYPERACTIVITY: ICD-10-CM

## 2019-08-26 DIAGNOSIS — F41.9 ANXIETY: ICD-10-CM

## 2019-08-26 DIAGNOSIS — G47.00 INSOMNIA, UNSPECIFIED TYPE: ICD-10-CM

## 2019-08-26 RX ORDER — LORAZEPAM 2 MG/1
2 TABLET ORAL EVERY 6 HOURS PRN
Qty: 28 TABLET | Refills: 0 | Status: SHIPPED | OUTPATIENT
Start: 2019-08-26 | End: 2019-09-24 | Stop reason: SDUPTHER

## 2019-08-26 RX ORDER — DEXTROAMPHETAMINE SACCHARATE, AMPHETAMINE ASPARTATE, DEXTROAMPHETAMINE SULFATE AND AMPHETAMINE SULFATE 7.5; 7.5; 7.5; 7.5 MG/1; MG/1; MG/1; MG/1
30 TABLET ORAL 2 TIMES DAILY
Qty: 60 TABLET | Refills: 0 | OUTPATIENT
Start: 2019-08-26 | End: 2019-09-25

## 2019-08-26 RX ORDER — CLONAZEPAM 2 MG/1
2 TABLET ORAL DAILY
Qty: 7 TABLET | Refills: 0 | Status: SHIPPED | OUTPATIENT
Start: 2019-08-26 | End: 2019-09-24 | Stop reason: CLARIF

## 2019-08-26 NOTE — TELEPHONE ENCOUNTER
No has to wait until next week, this cannot be sent to mail order like other  meds-must fill at local pharmacy so can wait until next week

## 2019-08-26 NOTE — TELEPHONE ENCOUNTER
Pt's  called states pt is out and according to the PDMP that is correct  Pt needed us to call [facility] and clarify that she is on a duplicate medication by being on both Clonazepam and Lorazepam  I spoke to Tom the Pharmacist with [facility] and RX will be sent out ASAP but will not arrive for 3 to 5 business days  Can we send a 7 day supply to [facility] until M/O arrive  RENUKA barron

## 2019-08-26 NOTE — TELEPHONE ENCOUNTER
They are aware but want the RX so they don't run out like the did with her Lorazepam and Clonzapem   Can we print and put a DNFB date on it

## 2019-08-28 DIAGNOSIS — F98.8 ATTENTION DEFICIT DISORDER (ADD) WITHOUT HYPERACTIVITY: ICD-10-CM

## 2019-08-28 RX ORDER — DEXTROAMPHETAMINE SACCHARATE, AMPHETAMINE ASPARTATE, DEXTROAMPHETAMINE SULFATE AND AMPHETAMINE SULFATE 7.5; 7.5; 7.5; 7.5 MG/1; MG/1; MG/1; MG/1
30 TABLET ORAL 2 TIMES DAILY
Qty: 60 TABLET | Refills: 0 | OUTPATIENT
Start: 2019-08-28 | End: 2019-09-27

## 2019-09-03 DIAGNOSIS — F98.8 ATTENTION DEFICIT DISORDER (ADD) WITHOUT HYPERACTIVITY: ICD-10-CM

## 2019-09-03 RX ORDER — DEXTROAMPHETAMINE SACCHARATE, AMPHETAMINE ASPARTATE, DEXTROAMPHETAMINE SULFATE AND AMPHETAMINE SULFATE 7.5; 7.5; 7.5; 7.5 MG/1; MG/1; MG/1; MG/1
30 TABLET ORAL 2 TIMES DAILY
Qty: 60 TABLET | Refills: 0 | Status: SHIPPED | OUTPATIENT
Start: 2019-09-03 | End: 2019-10-01 | Stop reason: SDUPTHER

## 2019-09-04 DIAGNOSIS — F98.8 ATTENTION DEFICIT DISORDER (ADD) WITHOUT HYPERACTIVITY: Primary | ICD-10-CM

## 2019-09-04 DIAGNOSIS — F98.8 ATTENTION DEFICIT DISORDER (ADD) WITHOUT HYPERACTIVITY: ICD-10-CM

## 2019-09-04 NOTE — TELEPHONE ENCOUNTER
Nina PINO states that pts Rx for Adderall 30 mg is on b/o, Can we change to Adderall 20 mg have pt take 1 1/2 bid?

## 2019-09-05 ENCOUNTER — TELEPHONE (OUTPATIENT)
Dept: FAMILY MEDICINE CLINIC | Facility: CLINIC | Age: 59
End: 2019-09-05

## 2019-09-05 DIAGNOSIS — F98.8 ATTENTION DEFICIT DISORDER (ADD) WITHOUT HYPERACTIVITY: ICD-10-CM

## 2019-09-05 RX ORDER — DEXTROAMPHETAMINE SACCHARATE, AMPHETAMINE ASPARTATE, DEXTROAMPHETAMINE SULFATE AND AMPHETAMINE SULFATE 5; 5; 5; 5 MG/1; MG/1; MG/1; MG/1
TABLET ORAL
Qty: 90 TABLET | Refills: 0 | Status: SHIPPED | OUTPATIENT
Start: 2019-09-05 | End: 2019-09-05 | Stop reason: SDUPTHER

## 2019-09-05 RX ORDER — DEXTROAMPHETAMINE SACCHARATE, AMPHETAMINE ASPARTATE, DEXTROAMPHETAMINE SULFATE AND AMPHETAMINE SULFATE 5; 5; 5; 5 MG/1; MG/1; MG/1; MG/1
TABLET ORAL
Qty: 90 TABLET | Refills: 0 | Status: SHIPPED | OUTPATIENT
Start: 2019-09-05 | End: 2019-10-22 | Stop reason: SDUPTHER

## 2019-09-05 NOTE — TELEPHONE ENCOUNTER
Per , patient's Adderall sent to the pharmacy is incorrect  At this point, Dr Marj Levine would like to have someone call the pharmacy to straighten this out   Please call pharmacy [phone]

## 2019-09-05 NOTE — TELEPHONE ENCOUNTER
The prescription should be sent to [facility] on [facility] [phone]  The pharmacy has sent a message to change the dispensing dosage due to not having the original order tablets in supply  There is a need for a new prescription to make the adjustment  The available tablets are 20 mg

## 2019-09-05 NOTE — TELEPHONE ENCOUNTER
If this much of an issue with add med cancel all rxes for adderall at pharmacy and gracie pt come in to see me tomorrow or Monday instead of the 17th

## 2019-09-10 RX ORDER — DEXTROAMPHETAMINE SACCHARATE, AMPHETAMINE ASPARTATE, DEXTROAMPHETAMINE SULFATE AND AMPHETAMINE SULFATE 7.5; 7.5; 7.5; 7.5 MG/1; MG/1; MG/1; MG/1
30 TABLET ORAL 2 TIMES DAILY
Qty: 60 TABLET | Refills: 0 | OUTPATIENT
Start: 2019-09-10 | End: 2019-10-10

## 2019-09-10 NOTE — TELEPHONE ENCOUNTER
There is major mix up on ADD meds and since pt no-showed I really would like her to make ov and keep appt

## 2019-09-10 NOTE — TELEPHONE ENCOUNTER
lmom for patient to call back and make an appointment for tomorrow, this is a 11 day old message so I don't know if it is still relavent~cd

## 2019-09-24 ENCOUNTER — OFFICE VISIT (OUTPATIENT)
Dept: FAMILY MEDICINE CLINIC | Facility: CLINIC | Age: 59
End: 2019-09-24
Payer: COMMERCIAL

## 2019-09-24 VITALS
DIASTOLIC BLOOD PRESSURE: 88 MMHG | HEIGHT: 63 IN | BODY MASS INDEX: 45.36 KG/M2 | SYSTOLIC BLOOD PRESSURE: 130 MMHG | HEART RATE: 84 BPM | WEIGHT: 256 LBS

## 2019-09-24 DIAGNOSIS — I10 BENIGN ESSENTIAL HYPERTENSION: ICD-10-CM

## 2019-09-24 DIAGNOSIS — F41.9 ANXIETY: ICD-10-CM

## 2019-09-24 DIAGNOSIS — M67.431 GANGLION CYST OF BOTH WRISTS: Primary | ICD-10-CM

## 2019-09-24 DIAGNOSIS — M67.432 GANGLION CYST OF BOTH WRISTS: Primary | ICD-10-CM

## 2019-09-24 DIAGNOSIS — E03.9 HYPOTHYROIDISM, UNSPECIFIED TYPE: ICD-10-CM

## 2019-09-24 PROCEDURE — 99214 OFFICE O/P EST MOD 30 MIN: CPT | Performed by: FAMILY MEDICINE

## 2019-09-24 RX ORDER — LORAZEPAM 2 MG/1
2 TABLET ORAL EVERY 6 HOURS PRN
Qty: 270 TABLET | Refills: 1 | Status: SHIPPED | OUTPATIENT
Start: 2019-09-24 | End: 2020-02-04 | Stop reason: SDUPTHER

## 2019-09-24 RX ORDER — CLONAZEPAM 1 MG/1
0.5 TABLET ORAL 2 TIMES DAILY
Qty: 30 TABLET | Refills: 0
Start: 2019-09-24 | End: 2020-02-04 | Stop reason: SDUPTHER

## 2019-09-24 NOTE — ASSESSMENT & PLAN NOTE
Has decreased clonazepam to 1 mg 1/2 tab per day due to dizziness, feels better on lower dose with no flare up of anxiety

## 2019-09-24 NOTE — PROGRESS NOTES
Assessment and Plan:    Problem List Items Addressed This Visit        Endocrine    Hypothyroidism     Due for f/u lab in Novemeber         Relevant Orders    TSH, 3rd generation    Comprehensive metabolic panel    Lipid panel       Other    Anxiety    Relevant Medications    LORazepam (ATIVAN) 2 mg tablet    clonazePAM (KlonoPIN) 1 mg tablet      Other Visit Diagnoses     Ganglion cyst of both wrists    -  Primary    Relevant Orders    Ambulatory referral to Orthopedic Surgery                 Diagnoses and all orders for this visit:    Ganglion cyst of both wrists  -     Ambulatory referral to Orthopedic Surgery; Future    Hypothyroidism, unspecified type  -     TSH, 3rd generation; Future  -     Comprehensive metabolic panel; Future  -     Lipid panel; Future    Anxiety  -     LORazepam (ATIVAN) 2 mg tablet; Take 1 tablet (2 mg total) by mouth every 6 (six) hours as needed for anxiety  -     clonazePAM (KlonoPIN) 1 mg tablet; Take 0 5 tablets (0 5 mg total) by mouth 2 (two) times a day              Subjective:      Patient ID: Brian Haney is a 62 y o  female  CC:    Chief Complaint   Patient presents with    Hypothyroidism    Anxiety    Hyperlipidemia    Ganglion Cyst     Pt would referral to have ganglion cysts removed  -  [facility]       HPI:    F/u thyroid-doing well, some dizziness and has taken a couple falls so has cut dose of clonazepam, has ganglion cysts both wrists      The following portions of the patient's history were reviewed and updated as appropriate: allergies, current medications, past family history, past medical history, past social history, past surgical history and problem list         Review of Systems   Constitutional: Negative for activity change, appetite change and unexpected weight change  Respiratory: Negative for shortness of breath  Cardiovascular: Negative for chest pain  Musculoskeletal: Positive for arthralgias          Ganglion cysts   Neurological: Positive for dizziness  Negative for headaches  Data to review:       Objective:    Vitals:    09/24/19 1523   BP: 130/88   BP Location: Left arm   Patient Position: Sitting   Cuff Size: Large   Pulse: 84   Weight: 116 kg (256 lb)   Height: 5' 3" (1 6 m)        Physical Exam   Constitutional: She appears well-developed and well-nourished  Neck: No thyromegaly present  Cardiovascular: Normal rate, regular rhythm and normal heart sounds  Pulmonary/Chest: Effort normal and breath sounds normal    Musculoskeletal: She exhibits deformity  She exhibits no edema  bilat ganglion cysts   Lymphadenopathy:     She has no cervical adenopathy  Vitals reviewed

## 2019-10-01 DIAGNOSIS — F98.8 ATTENTION DEFICIT DISORDER (ADD) WITHOUT HYPERACTIVITY: ICD-10-CM

## 2019-10-03 RX ORDER — DEXTROAMPHETAMINE SACCHARATE, AMPHETAMINE ASPARTATE, DEXTROAMPHETAMINE SULFATE AND AMPHETAMINE SULFATE 7.5; 7.5; 7.5; 7.5 MG/1; MG/1; MG/1; MG/1
30 TABLET ORAL 2 TIMES DAILY
Qty: 60 TABLET | Refills: 0 | Status: SHIPPED | OUTPATIENT
Start: 2019-10-03 | End: 2019-10-30 | Stop reason: SDUPTHER

## 2019-10-22 ENCOUNTER — CONSULT (OUTPATIENT)
Dept: OBGYN CLINIC | Facility: MEDICAL CENTER | Age: 59
End: 2019-10-22
Payer: COMMERCIAL

## 2019-10-22 VITALS
WEIGHT: 259 LBS | SYSTOLIC BLOOD PRESSURE: 173 MMHG | HEIGHT: 63 IN | DIASTOLIC BLOOD PRESSURE: 100 MMHG | BODY MASS INDEX: 45.89 KG/M2 | HEART RATE: 92 BPM

## 2019-10-22 DIAGNOSIS — M18.11 ARTHRITIS OF CARPOMETACARPAL (CMC) JOINT OF RIGHT THUMB: ICD-10-CM

## 2019-10-22 DIAGNOSIS — M67.432 GANGLION CYST OF BOTH WRISTS: ICD-10-CM

## 2019-10-22 DIAGNOSIS — M67.431 GANGLION CYST OF BOTH WRISTS: ICD-10-CM

## 2019-10-22 DIAGNOSIS — G56.03 BILATERAL CARPAL TUNNEL SYNDROME: ICD-10-CM

## 2019-10-22 DIAGNOSIS — M18.12 ARTHRITIS OF CARPOMETACARPAL (CMC) JOINT OF LEFT THUMB: Primary | ICD-10-CM

## 2019-10-22 PROCEDURE — 99204 OFFICE O/P NEW MOD 45 MIN: CPT | Performed by: ORTHOPAEDIC SURGERY

## 2019-10-22 PROCEDURE — 20600 DRAIN/INJ JOINT/BURSA W/O US: CPT | Performed by: ORTHOPAEDIC SURGERY

## 2019-10-22 RX ORDER — BETAMETHASONE SODIUM PHOSPHATE AND BETAMETHASONE ACETATE 3; 3 MG/ML; MG/ML
3 INJECTION, SUSPENSION INTRA-ARTICULAR; INTRALESIONAL; INTRAMUSCULAR; SOFT TISSUE
Status: COMPLETED | OUTPATIENT
Start: 2019-10-22 | End: 2019-10-22

## 2019-10-22 RX ORDER — LIDOCAINE HYDROCHLORIDE 10 MG/ML
0.5 INJECTION, SOLUTION INFILTRATION; PERINEURAL
Status: COMPLETED | OUTPATIENT
Start: 2019-10-22 | End: 2019-10-22

## 2019-10-22 RX ADMIN — LIDOCAINE HYDROCHLORIDE 0.5 ML: 10 INJECTION, SOLUTION INFILTRATION; PERINEURAL at 16:36

## 2019-10-22 RX ADMIN — BETAMETHASONE SODIUM PHOSPHATE AND BETAMETHASONE ACETATE 3 MG: 3; 3 INJECTION, SUSPENSION INTRA-ARTICULAR; INTRALESIONAL; INTRAMUSCULAR; SOFT TISSUE at 16:36

## 2019-10-22 NOTE — LETTER
October 22, 2019     Isaias Herrera, [address]    Patient: Gosia Lubin   YOB: 1960   Date of Visit: 10/22/2019       Dear Dr Pardeep Perkins:    Thank you for referring Karsten Williamson to me for evaluation  Below are my notes for this consultation  If you have questions, please do not hesitate to call me  I look forward to following your patient along with you  Sincerely,        Heriberto Almanza MD        CC: No Recipients  Heriberto Almanza MD  10/22/2019  6:53 PM  Sign at close encounter  Chief Complaint     Bilateral thumb pain and hand numbness and tingling      History of the Present Illness     Gosia Lubin is a 62 y o  female who is right-hand-dominant presents with left greater than right thumb pain  I was asked to see her in consultation at the request of Isaias Herrera  She notes that she has significant pain in the base of the thumbs and this is been there for nearly 10 years but worsening  She notes no clicking and popping but her hands do give out  She has decreased  strength and inability to twist objects to open them such as pill bottles  She notes that she had a volar ganglion cyst on the right that was aspirate a long time ago but that it came back  She also notes that she has numbness and tingling that wakes her from sleep at night and she has to shake her hands out  She has been having difficulty sleeping  She has taken ibuprofen with some help  She has also played IcyHot to her thumbs  She has not tried any braces            Past Medical History:   Diagnosis Date    ADD (attention deficit disorder)     Anxiety     Arthritis     Asthma     Bipolar 1 disorder (HCC)     Cellulitis     Cellulitis of leg     Depression     Difficulty swallowing     Disease of thyroid gland     hypothyroid    Dysphagia     Elevated lipids     Hearing loss     Hiatal hernia     diaphragmatic    Hyperlipidemia     Hypertension     Psychiatric disorder Bipolar    Schatzki's ring     Sleep apnea     Wears eyeglasses        Past Surgical History:   Procedure Laterality Date    AMPUTATION      Right little toe    BARIATRIC SURGERY      gastric sleeve    BUNIONECTOMY      CHOLECYSTECTOMY      ESOPHAGEAL DILATION      FOREARM SURGERY Left     FRACTURE REPAIR WITH METAL AND METAL REPLACED    GASTRIC BYPASS      HYSTERECTOMY  1998    age 34    JOINT REPLACEMENT      knee    KNEE SURGERY Left     PENILE ADHESIONS LYSIS      onset: 9/24/14    DE COLONOSCOPY FLX DX W/COLLJ SPEC WHEN PFRMD N/A 6/17/2016    Procedure: EGD AND COLONOSCOPY;  Surgeon: Ross Gage MD;  Location: [facility]; Service: Gastroenterology    DE ESOPHAGOGASTRODUODENOSCOPY TRANSORAL DIAGNOSTIC N/A 4/11/2017    Procedure: ESOPHAGOGASTRODUODENOSCOPY WITH BALLOON DILATATION;  Surgeon: Jah Perez MD;  Location: [facility];   Service: Gastroenterology    REPLACEMENT TOTAL KNEE      Left Side    ROTATOR CUFF REPAIR Left     SKIN LESION EXCISION      thighs    SLEEVE GASTROPLASTY      gastric sleeve    TONSILLECTOMY         Allergies   Allergen Reactions    Augmentin [Amoxicillin-Pot Clavulanate] GI Intolerance     Doesn't know    Clarithromycin GI Intolerance    Erythromycin GI Intolerance    Other GI Intolerance     vibryd       Current Outpatient Medications on File Prior to Visit   Medication Sig Dispense Refill    amLODIPine (NORVASC) 10 mg tablet Take 1 tablet (10 mg total) by mouth daily 90 tablet 3    amphetamine-dextroamphetamine (ADDERALL) 30 MG tablet Take 1 tablet (30 mg total) by mouth 2 (two) times a dayMax Daily Amount: 60 mg 60 tablet 0    clonazePAM (KlonoPIN) 1 mg tablet Take 0 5 tablets (0 5 mg total) by mouth 2 (two) times a day 30 tablet 0    FLUoxetine (PROzac) 20 mg capsule Take 2 capsules (40 mg total) by mouth daily 180 capsule 1    levothyroxine 100 mcg tablet Take 1 tablet (100 mcg total) by mouth daily 90 tablet 3    LORazepam (ATIVAN) 2 mg tablet Take 1 tablet (2 mg total) by mouth every 6 (six) hours as needed for anxiety 270 tablet 1    montelukast (SINGULAIR) 10 mg tablet Take 1 tablet (10 mg total) by mouth daily 90 tablet 3    Pediatric Multivitamins-Fl (MULTIVITAMINS/FL PO) Take by mouth      QUEtiapine (SEROquel) 100 mg tablet Take 1 tablet (100 mg total) by mouth daily at bedtime 90 tablet 3    meclizine (ANTIVERT) 12 5 MG tablet Take 1 tablet (12 5 mg total) by mouth every 8 (eight) hours as needed for dizziness (Patient not taking: Reported on 10/22/2019) 30 tablet 0    [DISCONTINUED] amphetamine-dextroamphetamine (ADDERALL) 20 mg tablet Take 1 1/2 tablets bid  (Patient not taking: Reported on 10/22/2019) 90 tablet 0     No current facility-administered medications on file prior to visit  Social History     Tobacco Use    Smoking status: Former Smoker     Last attempt to quit: 2011     Years since quittin 8    Smokeless tobacco: Never Used   Substance Use Topics    Alcohol use: Yes     Comment: occasionally     Drug use: No       Family History   Problem Relation Age of Onset   [facility] Breast cancer Mother 62    Lung cancer Mother     Alcohol abuse Father     Substance Abuse Father     Prostate cancer Father 79    Anxiety disorder Brother     Alcohol abuse Brother     Substance Abuse Brother     Mental illness Brother     Hepatitis Brother     Liver cancer Maternal Grandmother 72    Lung cancer Maternal Grandfather     Brain cancer Maternal Uncle     Breast cancer Paternal Aunt 71    Mental illness Family     Lung cancer Maternal Aunt        Review of Systems     As stated in the HPI  All other systems were reviewed and are negative  Physical Exam     BP (!) 173/100   Pulse 92   Ht 5' 3" (1 6 m)   Wt 117 kg (259 lb)   BMI 45 88 kg/m²      GENERAL: This is a well-developed, well-nourished, age-appropriate patient in no acute distress  The patient is alert and oriented x3  Pleasant and cooperative    Eyes: Anicteric sclerae  Extraocular movements appear intact  HENT: Nares are patent with no drainage  Lungs: There is equal chest rise on inspection  Breathing is non-labored with no audible wheezing  Cardiovascular: No cyanosis  No upper extremity lymphadema  Skin: Skin is warm to touch  No obvious skin lesions or rashes other than described below  Neurologic: No ataxia  Psychiatric: Mood and affect are appropriate  Examination of the bilateral upper extremities reveals adduction deformities to the thumbs  There is no thenar wasting  She has full digital range of motion with DPC 0  Slightly decreased sensation in the median nerve distribution  5/5 Motor to the APB, FDI, FDP2, FDP5, EDC  Sensation intact to light touch in the radial, and ulnar nerve distribution  Positive Tinel at the carpal tunnel  Positive Durkan's test at the carpal tunnel  Tenderness at the [facility] joints  Positive adduction stress test   Positive grind tests      Data Review     Results Reviewed     None             Imaging: Three-view imaging of the left wrist taken in 2017 reviewed by me today shows evidence of 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    Assessment and Plan      Diagnoses and all orders for this visit:    Ganglion cyst of both wrists  -     Ambulatory referral to Orthopedic Surgery           49-year-old female with bilateral CMC arthrosis as well as carpal tunnel syndrome  We discussed the etiology and natural course of carpal tunnel syndrome  We discussed that carpal tunnel syndrome is related to increased pressure on the nerve in the carpal canal at the level of the wrist   Increasing pressure can be a result of wrist flexion or extension or changes to the contents of the carpal tunnel  We discussed that progression of this condition from mild to severe can result in numbness and tingling as well as dysfunction of the hand and even atrophy and weakness to the thumb musculature    Treatment options for this condition range from nonoperative to operative and the mainstays are nighttime splinting for nonoperative measures and carpal tunnel release for operative measures  Trial of nonoperative measures for around 6 weeks is typically beneficial prior to any surgical intervention and can help to avoid surgery  Surgical release is performed with a mini open approach and while it can be performed with local only or local and sedation, there are risks to the procedure that include bleeding, infection, damage to surrounding neurovascular structures, weakness, pillar pain, and persistence of symptoms  I also discussed with the patient that if carpal tunnel persists in both wrists that surgical intervention can be performed simultaneously and that recovery is expedited  I have discussed the natural history of thumb carpometacarpal arthritis as well as the treatment options  We discussed that arthritis in the thumb can be treated similarly to how arthritis is treated in many parts of the body with conservative management and then surgical intervention if the nonsurgical options do not succeeded bringing sustained pain relief and good function  The nonsurgical options include oral anti-inflammatory medication, braces, hand therapy, and injectable medications such as corticosteroid  These can all be trialed and repeated multiple times  We have also discussed the surgical management of thumb carpometacarpal arthritis which include thumb carpometacarpal joint fusion or arthroplasty using LRTI or suspensionplasty techniques  We will try carpal tunnel braces for nighttime use as well as opponents splints for daytime activity use and she will receive bilateral thumb basal joint injections  Risks of the injection including pain, infection, tendon rupture, progression of arthritis, fat atrophy, depigmentation, and worsening of symptoms were all discussed with the patient    There was good understanding by the patient and they wish to proceed  Follow Up:  P r n      To Do Next Visit:     PROCEDURES PERFORMED:  Small joint arthrocentesis: R thumb CMC  Date/Time: 10/22/2019 4:36 PM  Consent given by: patient  Site marked: site marked  Timeout: Immediately prior to procedure a time out was called to verify the correct patient, procedure, equipment, support staff and site/side marked as required   Supporting Documentation  Indications: pain   Procedure Details  Location: thumb - R thumb CMC  Preparation: Patient was prepped and draped in the usual sterile fashion  Needle size: 25 G  Medications administered: 3 mg betamethasone acetate-betamethasone sodium phosphate 6 (3-3) mg/mL; 0 5 mL lidocaine 1 %    Patient tolerance: patient tolerated the procedure well with no immediate complications  Dressing:  Sterile dressing applied    Small joint arthrocentesis: L thumb CMC  Date/Time: 10/22/2019 4:36 PM  Consent given by: patient  Site marked: site marked  Timeout: Immediately prior to procedure a time out was called to verify the correct patient, procedure, equipment, support staff and site/side marked as required   Supporting Documentation  Indications: pain   Procedure Details  Location: thumb - L thumb CMC  Preparation: Patient was prepped and draped in the usual sterile fashion  Needle size: 25 G  Medications administered: 0 5 mL lidocaine 1 %; 3 mg betamethasone acetate-betamethasone sodium phosphate 6 (3-3) mg/mL    Patient tolerance: patient tolerated the procedure well with no immediate complications  Dressing:  Sterile dressing applied

## 2019-10-22 NOTE — PROGRESS NOTES
Chief Complaint     Bilateral thumb pain and hand numbness and tingling      History of the Present Illness     Eliot Juares is a 62 y o  female who is right-hand-dominant presents with left greater than right thumb pain  I was asked to see her in consultation at the request of Alma Linda  She notes that she has significant pain in the base of the thumbs and this is been there for nearly 10 years but worsening  She notes no clicking and popping but her hands do give out  She has decreased  strength and inability to twist objects to open them such as pill bottles  She notes that she had a volar ganglion cyst on the right that was aspirate a long time ago but that it came back  She also notes that she has numbness and tingling that wakes her from sleep at night and she has to shake her hands out  She has been having difficulty sleeping  She has taken ibuprofen with some help  She has also played IcyHot to her thumbs  She has not tried any braces            Past Medical History:   Diagnosis Date    ADD (attention deficit disorder)     Anxiety     Arthritis     Asthma     Bipolar 1 disorder (HCC)     Cellulitis     Cellulitis of leg     Depression     Difficulty swallowing     Disease of thyroid gland     hypothyroid    Dysphagia     Elevated lipids     Hearing loss     Hiatal hernia     diaphragmatic    Hyperlipidemia     Hypertension     Psychiatric disorder     Bipolar    Schatzki's ring     Sleep apnea     Wears eyeglasses        Past Surgical History:   Procedure Laterality Date    AMPUTATION      Right little toe    BARIATRIC SURGERY      gastric sleeve    BUNIONECTOMY      CHOLECYSTECTOMY      ESOPHAGEAL DILATION      FOREARM SURGERY Left     FRACTURE REPAIR WITH METAL AND METAL REPLACED    GASTRIC BYPASS      HYSTERECTOMY  1998    age 34    JOINT REPLACEMENT      knee    KNEE SURGERY Left     PENILE ADHESIONS LYSIS      onset: 9/24/14    VA COLONOSCOPY FLX DX W/COLLJ [facility] WHEN PFRMD N/A 6/17/2016    Procedure: EGD AND COLONOSCOPY;  Surgeon: Luann Camacho MD;  Location: [facility]; Service: Gastroenterology    FL ESOPHAGOGASTRODUODENOSCOPY TRANSORAL DIAGNOSTIC N/A 4/11/2017    Procedure: ESOPHAGOGASTRODUODENOSCOPY WITH BALLOON DILATATION;  Surgeon: Steven Santiago MD;  Location: [facility];   Service: Gastroenterology    REPLACEMENT TOTAL KNEE      Left Side    ROTATOR CUFF REPAIR Left     SKIN LESION EXCISION      thighs    SLEEVE GASTROPLASTY      gastric sleeve    TONSILLECTOMY         Allergies   Allergen Reactions    Augmentin [Amoxicillin-Pot Clavulanate] GI Intolerance     Doesn't know    Clarithromycin GI Intolerance    Erythromycin GI Intolerance    Other GI Intolerance     vibryd       Current Outpatient Medications on File Prior to Visit   Medication Sig Dispense Refill    amLODIPine (NORVASC) 10 mg tablet Take 1 tablet (10 mg total) by mouth daily 90 tablet 3    amphetamine-dextroamphetamine (ADDERALL) 30 MG tablet Take 1 tablet (30 mg total) by mouth 2 (two) times a dayMax Daily Amount: 60 mg 60 tablet 0    clonazePAM (KlonoPIN) 1 mg tablet Take 0 5 tablets (0 5 mg total) by mouth 2 (two) times a day 30 tablet 0    FLUoxetine (PROzac) 20 mg capsule Take 2 capsules (40 mg total) by mouth daily 180 capsule 1    levothyroxine 100 mcg tablet Take 1 tablet (100 mcg total) by mouth daily 90 tablet 3    LORazepam (ATIVAN) 2 mg tablet Take 1 tablet (2 mg total) by mouth every 6 (six) hours as needed for anxiety 270 tablet 1    montelukast (SINGULAIR) 10 mg tablet Take 1 tablet (10 mg total) by mouth daily 90 tablet 3    Pediatric Multivitamins-Fl (MULTIVITAMINS/FL PO) Take by mouth      QUEtiapine (SEROquel) 100 mg tablet Take 1 tablet (100 mg total) by mouth daily at bedtime 90 tablet 3    meclizine (ANTIVERT) 12 5 MG tablet Take 1 tablet (12 5 mg total) by mouth every 8 (eight) hours as needed for dizziness (Patient not taking: Reported on 10/22/2019) 30 tablet 0    [DISCONTINUED] amphetamine-dextroamphetamine (ADDERALL) 20 mg tablet Take 1 1/2 tablets bid  (Patient not taking: Reported on 10/22/2019) 90 tablet 0     No current facility-administered medications on file prior to visit  Social History     Tobacco Use    Smoking status: Former Smoker     Last attempt to quit: 2011     Years since quittin 8    Smokeless tobacco: Never Used   Substance Use Topics    Alcohol use: Yes     Comment: occasionally     Drug use: No       Family History   Problem Relation Age of Onset   [employer] Breast cancer Mother 62    Lung cancer Mother     Alcohol abuse Father     Substance Abuse Father     Prostate cancer Father 79    Anxiety disorder Brother     Alcohol abuse Brother     Substance Abuse Brother     Mental illness Brother     Hepatitis Brother     Liver cancer Maternal Grandmother 72    Lung cancer Maternal Grandfather     Brain cancer Maternal Uncle     Breast cancer Paternal Aunt 71    Mental illness Family     Lung cancer Maternal Aunt        Review of Systems     As stated in the HPI  All other systems were reviewed and are negative  Physical Exam     BP (!) 173/100   Pulse 92   Ht 5' 3" (1 6 m)   Wt 117 kg (259 lb)   BMI 45 88 kg/m²     GENERAL: This is a well-developed, well-nourished, age-appropriate patient in no acute distress  The patient is alert and oriented x3  Pleasant and cooperative  Eyes: Anicteric sclerae  Extraocular movements appear intact  HENT: Nares are patent with no drainage  Lungs: There is equal chest rise on inspection  Breathing is non-labored with no audible wheezing  Cardiovascular: No cyanosis  No upper extremity lymphadema  Skin: Skin is warm to touch  No obvious skin lesions or rashes other than described below  Neurologic: No ataxia  Psychiatric: Mood and affect are appropriate  Examination of the bilateral upper extremities reveals adduction deformities to the thumbs  There is no thenar wasting  She has full digital range of motion with DPC 0  Slightly decreased sensation in the median nerve distribution  5/5 Motor to the APB, FDI, FDP2, FDP5, EDC  Sensation intact to light touch in the radial, and ulnar nerve distribution  Positive Tinel at the carpal tunnel  Positive Durkan's test at the carpal tunnel  Tenderness at the [facility] joints  Positive adduction stress test   Positive grind tests      Data Review     Results Reviewed     None             Imaging: Three-view imaging of the left wrist taken in 2017 reviewed by me today shows evidence of 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    Assessment and Plan      Diagnoses and all orders for this visit:    Ganglion cyst of both wrists  -     Ambulatory referral to Orthopedic Surgery           58-year-old female with bilateral CMC arthrosis as well as carpal tunnel syndrome  We discussed the etiology and natural course of carpal tunnel syndrome  We discussed that carpal tunnel syndrome is related to increased pressure on the nerve in the carpal canal at the level of the wrist   Increasing pressure can be a result of wrist flexion or extension or changes to the contents of the carpal tunnel  We discussed that progression of this condition from mild to severe can result in numbness and tingling as well as dysfunction of the hand and even atrophy and weakness to the thumb musculature  Treatment options for this condition range from nonoperative to operative and the mainstays are nighttime splinting for nonoperative measures and carpal tunnel release for operative measures  Trial of nonoperative measures for around 6 weeks is typically beneficial prior to any surgical intervention and can help to avoid surgery    Surgical release is performed with a mini open approach and while it can be performed with local only or local and sedation, there are risks to the procedure that include bleeding, infection, damage to surrounding neurovascular structures, weakness, pillar pain, and persistence of symptoms  I also discussed with the patient that if carpal tunnel persists in both wrists that surgical intervention can be performed simultaneously and that recovery is expedited  I have discussed the natural history of thumb carpometacarpal arthritis as well as the treatment options  We discussed that arthritis in the thumb can be treated similarly to how arthritis is treated in many parts of the body with conservative management and then surgical intervention if the nonsurgical options do not succeeded bringing sustained pain relief and good function  The nonsurgical options include oral anti-inflammatory medication, braces, hand therapy, and injectable medications such as corticosteroid  These can all be trialed and repeated multiple times  We have also discussed the surgical management of thumb carpometacarpal arthritis which include thumb carpometacarpal joint fusion or arthroplasty using LRTI or suspensionplasty techniques  We will try carpal tunnel braces for nighttime use as well as opponents splints for daytime activity use and she will receive bilateral thumb basal joint injections  Risks of the injection including pain, infection, tendon rupture, progression of arthritis, fat atrophy, depigmentation, and worsening of symptoms were all discussed with the patient  There was good understanding by the patient and they wish to proceed  Follow Up:  P r n      To Do Next Visit:     PROCEDURES PERFORMED:  Small joint arthrocentesis: R thumb CMC  Date/Time: 10/22/2019 4:36 PM  Consent given by: patient  Site marked: site marked  Timeout: Immediately prior to procedure a time out was called to verify the correct patient, procedure, equipment, support staff and site/side marked as required   Supporting Documentation  Indications: pain   Procedure Details  Location: thumb - R thumb CMC  Preparation: Patient was prepped and draped in the usual sterile fashion  Needle size: 25 G  Medications administered: 3 mg betamethasone acetate-betamethasone sodium phosphate 6 (3-3) mg/mL; 0 5 mL lidocaine 1 %    Patient tolerance: patient tolerated the procedure well with no immediate complications  Dressing:  Sterile dressing applied    Small joint arthrocentesis: L thumb CMC  Date/Time: 10/22/2019 4:36 PM  Consent given by: patient  Site marked: site marked  Timeout: Immediately prior to procedure a time out was called to verify the correct patient, procedure, equipment, support staff and site/side marked as required   Supporting Documentation  Indications: pain   Procedure Details  Location: thumb - L thumb CMC  Preparation: Patient was prepped and draped in the usual sterile fashion  Needle size: 25 G  Medications administered: 0 5 mL lidocaine 1 %; 3 mg betamethasone acetate-betamethasone sodium phosphate 6 (3-3) mg/mL    Patient tolerance: patient tolerated the procedure well with no immediate complications  Dressing:  Sterile dressing applied

## 2019-10-30 DIAGNOSIS — F98.8 ATTENTION DEFICIT DISORDER (ADD) WITHOUT HYPERACTIVITY: ICD-10-CM

## 2019-11-01 RX ORDER — DEXTROAMPHETAMINE SACCHARATE, AMPHETAMINE ASPARTATE, DEXTROAMPHETAMINE SULFATE AND AMPHETAMINE SULFATE 7.5; 7.5; 7.5; 7.5 MG/1; MG/1; MG/1; MG/1
30 TABLET ORAL 2 TIMES DAILY
Qty: 60 TABLET | Refills: 0 | Status: SHIPPED | OUTPATIENT
Start: 2019-11-01 | End: 2019-11-26 | Stop reason: SDUPTHER

## 2019-11-03 DIAGNOSIS — F32.A DEPRESSION, UNSPECIFIED DEPRESSION TYPE: ICD-10-CM

## 2019-11-03 RX ORDER — FLUOXETINE HYDROCHLORIDE 20 MG/1
CAPSULE ORAL
Qty: 180 CAPSULE | Refills: 1 | Status: SHIPPED | OUTPATIENT
Start: 2019-11-03 | End: 2020-02-04 | Stop reason: SDUPTHER

## 2019-11-26 DIAGNOSIS — F98.8 ATTENTION DEFICIT DISORDER (ADD) WITHOUT HYPERACTIVITY: ICD-10-CM

## 2019-11-26 NOTE — TELEPHONE ENCOUNTER
LMOM for patient to call to see how she ran out of Lorazepam already  She should have about 30 or so left  Janee Bentley

## 2019-11-26 NOTE — TELEPHONE ENCOUNTER
PT  CALLED IN BECAUSE HE SD THAT PT RAN OUT OF LORAZEPAM  THE NORMALLY GET IT THROUGH MAIL ORDER AND HAS NOT RECVD IT  WAS TOLD TO CALL US FOR A 30 DAYS SUPPLY SINCE SHE RAN OUT   UNTIL THEY CAN TRACK PT ORDER   [address]  PH# [phone]  Prachi Heller

## 2019-11-29 RX ORDER — DEXTROAMPHETAMINE SACCHARATE, AMPHETAMINE ASPARTATE, DEXTROAMPHETAMINE SULFATE AND AMPHETAMINE SULFATE 7.5; 7.5; 7.5; 7.5 MG/1; MG/1; MG/1; MG/1
30 TABLET ORAL 2 TIMES DAILY
Qty: 60 TABLET | Refills: 0 | Status: SHIPPED | OUTPATIENT
Start: 2019-11-29 | End: 2019-12-27 | Stop reason: SDUPTHER

## 2019-12-27 DIAGNOSIS — F98.8 ATTENTION DEFICIT DISORDER (ADD) WITHOUT HYPERACTIVITY: ICD-10-CM

## 2019-12-27 RX ORDER — DEXTROAMPHETAMINE SACCHARATE, AMPHETAMINE ASPARTATE, DEXTROAMPHETAMINE SULFATE AND AMPHETAMINE SULFATE 7.5; 7.5; 7.5; 7.5 MG/1; MG/1; MG/1; MG/1
30 TABLET ORAL 2 TIMES DAILY
Qty: 60 TABLET | Refills: 0 | Status: SHIPPED | OUTPATIENT
Start: 2019-12-27 | End: 2020-01-28 | Stop reason: SDUPTHER

## 2020-01-08 DIAGNOSIS — G47.00 INSOMNIA, UNSPECIFIED TYPE: ICD-10-CM

## 2020-01-08 RX ORDER — CLONAZEPAM 2 MG/1
TABLET ORAL
Qty: 90 TABLET | Refills: 0 | OUTPATIENT
Start: 2020-01-08

## 2020-01-09 ENCOUNTER — TELEPHONE (OUTPATIENT)
Dept: FAMILY MEDICINE CLINIC | Facility: CLINIC | Age: 60
End: 2020-01-09

## 2020-01-09 NOTE — TELEPHONE ENCOUNTER
Fyi,pt  called back and I told him Dr Pardeep Perkins wants to see how she does with 3 instead of 4 and he sd he will call if she feels weird

## 2020-01-09 NOTE — TELEPHONE ENCOUNTER
MF, Pts  is questioning why pt  Only got # 270 tablets of her Lorazepam instead of # 360 which she usually gets from her m/o?

## 2020-01-28 DIAGNOSIS — F98.8 ATTENTION DEFICIT DISORDER (ADD) WITHOUT HYPERACTIVITY: ICD-10-CM

## 2020-01-28 RX ORDER — DEXTROAMPHETAMINE SACCHARATE, AMPHETAMINE ASPARTATE, DEXTROAMPHETAMINE SULFATE AND AMPHETAMINE SULFATE 7.5; 7.5; 7.5; 7.5 MG/1; MG/1; MG/1; MG/1
30 TABLET ORAL 2 TIMES DAILY
Qty: 60 TABLET | Refills: 0 | Status: SHIPPED | OUTPATIENT
Start: 2020-01-28 | End: 2020-02-27 | Stop reason: SDUPTHER

## 2020-02-01 ENCOUNTER — APPOINTMENT (OUTPATIENT)
Dept: LAB | Facility: MEDICAL CENTER | Age: 60
End: 2020-02-01
Payer: COMMERCIAL

## 2020-02-01 DIAGNOSIS — E03.9 HYPOTHYROIDISM, UNSPECIFIED TYPE: ICD-10-CM

## 2020-02-01 LAB
ALBUMIN SERPL BCP-MCNC: 3.2 G/DL (ref 3.5–5)
ALP SERPL-CCNC: 118 U/L (ref 46–116)
ALT SERPL W P-5'-P-CCNC: 16 U/L (ref 12–78)
ANION GAP SERPL CALCULATED.3IONS-SCNC: 2 MMOL/L (ref 4–13)
AST SERPL W P-5'-P-CCNC: 14 U/L (ref 5–45)
BILIRUB SERPL-MCNC: 0.34 MG/DL (ref 0.2–1)
BUN SERPL-MCNC: 14 MG/DL (ref 5–25)
CALCIUM SERPL-MCNC: 9.3 MG/DL (ref 8.3–10.1)
CHLORIDE SERPL-SCNC: 106 MMOL/L (ref 100–108)
CHOLEST SERPL-MCNC: 210 MG/DL (ref 50–200)
CO2 SERPL-SCNC: 32 MMOL/L (ref 21–32)
CREAT SERPL-MCNC: 1 MG/DL (ref 0.6–1.3)
GFR SERPL CREATININE-BSD FRML MDRD: 61 ML/MIN/1.73SQ M
GLUCOSE P FAST SERPL-MCNC: 103 MG/DL (ref 65–99)
HDLC SERPL-MCNC: 93 MG/DL
LDLC SERPL CALC-MCNC: 94 MG/DL (ref 0–100)
NONHDLC SERPL-MCNC: 117 MG/DL
POTASSIUM SERPL-SCNC: 3.6 MMOL/L (ref 3.5–5.3)
PROT SERPL-MCNC: 7.2 G/DL (ref 6.4–8.2)
SODIUM SERPL-SCNC: 140 MMOL/L (ref 136–145)
TRIGL SERPL-MCNC: 114 MG/DL
TSH SERPL DL<=0.05 MIU/L-ACNC: 2.82 UIU/ML (ref 0.36–3.74)

## 2020-02-01 PROCEDURE — 80053 COMPREHEN METABOLIC PANEL: CPT

## 2020-02-01 PROCEDURE — 80061 LIPID PANEL: CPT

## 2020-02-01 PROCEDURE — 84443 ASSAY THYROID STIM HORMONE: CPT

## 2020-02-01 PROCEDURE — 36415 COLL VENOUS BLD VENIPUNCTURE: CPT

## 2020-02-04 ENCOUNTER — OFFICE VISIT (OUTPATIENT)
Dept: FAMILY MEDICINE CLINIC | Facility: CLINIC | Age: 60
End: 2020-02-04
Payer: COMMERCIAL

## 2020-02-04 VITALS
HEART RATE: 104 BPM | WEIGHT: 267 LBS | RESPIRATION RATE: 18 BRPM | HEIGHT: 63 IN | SYSTOLIC BLOOD PRESSURE: 138 MMHG | DIASTOLIC BLOOD PRESSURE: 84 MMHG | TEMPERATURE: 98.3 F | BODY MASS INDEX: 47.31 KG/M2

## 2020-02-04 DIAGNOSIS — J45.20 MILD INTERMITTENT ASTHMA WITHOUT COMPLICATION: Primary | ICD-10-CM

## 2020-02-04 DIAGNOSIS — E66.01 MORBID OBESITY (HCC): ICD-10-CM

## 2020-02-04 DIAGNOSIS — F41.9 ANXIETY: ICD-10-CM

## 2020-02-04 DIAGNOSIS — I10 BENIGN ESSENTIAL HYPERTENSION: ICD-10-CM

## 2020-02-04 PROCEDURE — 99214 OFFICE O/P EST MOD 30 MIN: CPT | Performed by: FAMILY MEDICINE

## 2020-02-04 PROCEDURE — 3075F SYST BP GE 130 - 139MM HG: CPT | Performed by: FAMILY MEDICINE

## 2020-02-04 PROCEDURE — 3079F DIAST BP 80-89 MM HG: CPT | Performed by: FAMILY MEDICINE

## 2020-02-04 PROCEDURE — 1036F TOBACCO NON-USER: CPT | Performed by: FAMILY MEDICINE

## 2020-02-04 PROCEDURE — 3008F BODY MASS INDEX DOCD: CPT | Performed by: FAMILY MEDICINE

## 2020-02-04 RX ORDER — CLONAZEPAM 1 MG/1
TABLET ORAL
Qty: 135 TABLET | Refills: 1 | Status: SHIPPED | OUTPATIENT
Start: 2020-02-04 | End: 2020-02-04 | Stop reason: SDUPTHER

## 2020-02-04 RX ORDER — LORAZEPAM 2 MG/1
2 TABLET ORAL EVERY 6 HOURS PRN
Qty: 270 TABLET | Refills: 1 | Status: SHIPPED | OUTPATIENT
Start: 2020-02-04 | End: 2020-09-04

## 2020-02-04 RX ORDER — LORAZEPAM 2 MG/1
2 TABLET ORAL EVERY 6 HOURS PRN
Qty: 270 TABLET | Refills: 1 | Status: SHIPPED | OUTPATIENT
Start: 2020-02-04 | End: 2020-02-04 | Stop reason: SDUPTHER

## 2020-02-04 RX ORDER — AZITHROMYCIN 250 MG/1
TABLET, FILM COATED ORAL
Qty: 6 TABLET | Refills: 0 | Status: SHIPPED | OUTPATIENT
Start: 2020-02-04 | End: 2020-02-08

## 2020-02-04 RX ORDER — ALBUTEROL SULFATE 90 UG/1
2 AEROSOL, METERED RESPIRATORY (INHALATION) EVERY 6 HOURS PRN
Qty: 1 INHALER | Refills: 5 | Status: SHIPPED | OUTPATIENT
Start: 2020-02-04 | End: 2020-07-24 | Stop reason: SDUPTHER

## 2020-02-04 RX ORDER — CLONAZEPAM 1 MG/1
TABLET ORAL
Qty: 135 TABLET | Refills: 1 | Status: SHIPPED | OUTPATIENT
Start: 2020-02-04 | End: 2020-09-24 | Stop reason: SDUPTHER

## 2020-02-04 RX ORDER — PREDNISONE 20 MG/1
20 TABLET ORAL DAILY
Qty: 10 TABLET | Refills: 0 | Status: SHIPPED | OUTPATIENT
Start: 2020-02-04 | End: 2020-07-24 | Stop reason: ALTCHOICE

## 2020-02-04 NOTE — ASSESSMENT & PLAN NOTE
Will decrease clonazepam to 11/2 at bedtime, will try to do 3 of lorazepam on good days but brother is major stress right now

## 2020-02-04 NOTE — PROGRESS NOTES
Assessment and Plan:    Problem List Items Addressed This Visit        Respiratory    Mild intermittent asthma without complication - Primary    Relevant Medications    albuterol (PROVENTIL HFA,VENTOLIN HFA) 90 mcg/act inhaler    azithromycin (ZITHROMAX) 250 mg tablet    predniSONE 20 mg tablet       Cardiovascular and Mediastinum    Benign essential hypertension       Other    Anxiety     Will decrease clonazepam to 11/2 at bedtime, will try to do 3 of lorazepam on good days but brother is major stress right now         Relevant Medications    clonazePAM (KlonoPIN) 1 mg tablet    LORazepam (ATIVAN) 2 mg tablet    Morbid obesity ([facility] [address] )     Continue to work on weight loss                      Diagnoses and all orders for this visit:    Mild intermittent asthma without complication  -     albuterol (PROVENTIL HFA,VENTOLIN HFA) 90 mcg/act inhaler; Inhale 2 puffs every 6 (six) hours as needed for wheezing or shortness of breath  -     azithromycin (ZITHROMAX) 250 mg tablet; 2 1st day, 1/d for 4 days  -     predniSONE 20 mg tablet; Take 1 tablet (20 mg total) by mouth daily    Anxiety  -     Discontinue: clonazePAM (KlonoPIN) 1 mg tablet; 1 1/2 tabs at night  -     Discontinue: LORazepam (ATIVAN) 2 mg tablet; Take 1 tablet (2 mg total) by mouth every 6 (six) hours as needed for anxiety  -     clonazePAM (KlonoPIN) 1 mg tablet; 1 1/2 tabs at night  -     LORazepam (ATIVAN) 2 mg tablet; Take 1 tablet (2 mg total) by mouth every 6 (six) hours as needed for anxiety    Benign essential hypertension    Morbid obesity (HCC)              Subjective:      Patient ID: Olayinka Ford is a 56 y o  female  CC:    Chief Complaint   Patient presents with    Follow-up     Patient present today for a follow up on her medications  Per patient she will like to discuss with Dr Juju Schroeder about her Clonazepam     Cough     Pt complaints of persisten productive cough, runny nose, nasal congestion and post nasal drip for the past 2 weeks  HPI:    Here for f/u anxiety, BP, thyroid, lab reviewed, cough productive yellow mucus      The following portions of the patient's history were reviewed and updated as appropriate: allergies, current medications, past family history, past medical history, past social history, past surgical history and problem list       Review of Systems   Constitutional: Negative for activity change, appetite change and unexpected weight change  HENT: Positive for postnasal drip  Respiratory: Positive for cough and shortness of breath  Cardiovascular: Negative for chest pain  Neurological: Negative for dizziness and headaches  Psychiatric/Behavioral: The patient is nervous/anxious  Data to review:       Objective:    Vitals:    02/04/20 1428 02/04/20 1455   BP: 156/98 138/84   BP Location: Left arm Left arm   Patient Position: Sitting Sitting   Cuff Size: Large Standard   Pulse: 104    Resp: 18    Temp: 98 3 °F (36 8 °C)    TempSrc: Temporal    Weight: 121 kg (267 lb)    Height: 5' 3" (1 6 m)         Physical Exam   Constitutional: She appears well-developed and well-nourished  Neck: No thyromegaly present  Cardiovascular: Normal rate, regular rhythm and normal heart sounds  Pulmonary/Chest: Effort normal  She has wheezes in the right lower field and the left lower field  Musculoskeletal: She exhibits no edema  Lymphadenopathy:     She has no cervical adenopathy  Vitals reviewed

## 2020-02-27 DIAGNOSIS — F98.8 ATTENTION DEFICIT DISORDER (ADD) WITHOUT HYPERACTIVITY: ICD-10-CM

## 2020-02-27 RX ORDER — DEXTROAMPHETAMINE SACCHARATE, AMPHETAMINE ASPARTATE, DEXTROAMPHETAMINE SULFATE AND AMPHETAMINE SULFATE 7.5; 7.5; 7.5; 7.5 MG/1; MG/1; MG/1; MG/1
30 TABLET ORAL 2 TIMES DAILY
Qty: 60 TABLET | Refills: 0 | Status: SHIPPED | OUTPATIENT
Start: 2020-02-27 | End: 2020-03-24 | Stop reason: SDUPTHER

## 2020-03-19 DIAGNOSIS — J45.40 MODERATE PERSISTENT ASTHMA WITHOUT COMPLICATION: ICD-10-CM

## 2020-03-20 RX ORDER — MONTELUKAST SODIUM 10 MG/1
TABLET ORAL
Qty: 90 TABLET | Refills: 3 | Status: SHIPPED | OUTPATIENT
Start: 2020-03-20 | End: 2021-02-28

## 2020-03-24 DIAGNOSIS — F98.8 ATTENTION DEFICIT DISORDER (ADD) WITHOUT HYPERACTIVITY: ICD-10-CM

## 2020-03-24 RX ORDER — DEXTROAMPHETAMINE SACCHARATE, AMPHETAMINE ASPARTATE, DEXTROAMPHETAMINE SULFATE AND AMPHETAMINE SULFATE 7.5; 7.5; 7.5; 7.5 MG/1; MG/1; MG/1; MG/1
30 TABLET ORAL 2 TIMES DAILY
Qty: 60 TABLET | Refills: 0 | Status: SHIPPED | OUTPATIENT
Start: 2020-03-24 | End: 2020-04-21 | Stop reason: SDUPTHER

## 2020-03-24 NOTE — TELEPHONE ENCOUNTER
02/27/2020  2  02/27/2020  DEXTROAMP-AMPHETAMIN 30 MG TAB  60 0  30  [facility]  [id number]  [facility] ([id number])  0   [place]    02/26/2020  2  02/04/2020  CLONAZEPAM 1 MG TABLET  135 0  90  [facility]  [id number]  [facility] ([id number])  0   [place]    01/29/2020  2  01/28/2020  DEXTROAMP-AMPHETAMIN 30 MG TAB  60 0  30  [facility]  [id number]  [facility] (4301)  0   [place]      PDMP as above

## 2020-04-21 DIAGNOSIS — F98.8 ATTENTION DEFICIT DISORDER (ADD) WITHOUT HYPERACTIVITY: ICD-10-CM

## 2020-04-22 RX ORDER — DEXTROAMPHETAMINE SACCHARATE, AMPHETAMINE ASPARTATE, DEXTROAMPHETAMINE SULFATE AND AMPHETAMINE SULFATE 7.5; 7.5; 7.5; 7.5 MG/1; MG/1; MG/1; MG/1
30 TABLET ORAL 2 TIMES DAILY
Qty: 60 TABLET | Refills: 0 | Status: SHIPPED | OUTPATIENT
Start: 2020-04-22 | End: 2020-05-20 | Stop reason: SDUPTHER

## 2020-04-25 DIAGNOSIS — F31.78 BIPOLAR 1 DISORDER, MIXED, FULL REMISSION (HCC): ICD-10-CM

## 2020-04-25 DIAGNOSIS — F32.A DEPRESSION, UNSPECIFIED DEPRESSION TYPE: ICD-10-CM

## 2020-04-25 DIAGNOSIS — E03.9 HYPOTHYROIDISM, UNSPECIFIED TYPE: ICD-10-CM

## 2020-04-26 RX ORDER — FLUOXETINE HYDROCHLORIDE 20 MG/1
CAPSULE ORAL
Qty: 180 CAPSULE | Refills: 1 | Status: SHIPPED | OUTPATIENT
Start: 2020-04-26 | End: 2020-09-24 | Stop reason: SDUPTHER

## 2020-04-26 RX ORDER — QUETIAPINE FUMARATE 100 MG/1
TABLET, FILM COATED ORAL
Qty: 90 TABLET | Refills: 1 | Status: SHIPPED | OUTPATIENT
Start: 2020-04-26 | End: 2020-09-24 | Stop reason: SDUPTHER

## 2020-04-26 RX ORDER — LEVOTHYROXINE SODIUM 0.1 MG/1
TABLET ORAL
Qty: 90 TABLET | Refills: 1 | Status: SHIPPED | OUTPATIENT
Start: 2020-04-26 | End: 2020-07-24 | Stop reason: SDUPTHER

## 2020-05-13 ENCOUNTER — HOSPITAL ENCOUNTER (OUTPATIENT)
Dept: MAMMOGRAPHY | Facility: MEDICAL CENTER | Age: 60
Discharge: HOME | End: 2020-05-13
Payer: COMMERCIAL

## 2020-05-13 VITALS — BODY MASS INDEX: 46.07 KG/M2 | WEIGHT: 260 LBS | HEIGHT: 63 IN

## 2020-05-13 DIAGNOSIS — Z12.31 ENCOUNTER FOR SCREENING MAMMOGRAM FOR MALIGNANT NEOPLASM OF BREAST: ICD-10-CM

## 2020-05-13 PROCEDURE — 77063 BREAST TOMOSYNTHESIS BI: CPT

## 2020-05-13 PROCEDURE — 77067 SCR MAMMO BI INCL CAD: CPT

## 2020-05-20 DIAGNOSIS — F98.8 ATTENTION DEFICIT DISORDER (ADD) WITHOUT HYPERACTIVITY: ICD-10-CM

## 2020-05-20 RX ORDER — DEXTROAMPHETAMINE SACCHARATE, AMPHETAMINE ASPARTATE, DEXTROAMPHETAMINE SULFATE AND AMPHETAMINE SULFATE 7.5; 7.5; 7.5; 7.5 MG/1; MG/1; MG/1; MG/1
30 TABLET ORAL 2 TIMES DAILY
Qty: 60 TABLET | Refills: 0 | Status: SHIPPED | OUTPATIENT
Start: 2020-05-20 | End: 2020-06-16 | Stop reason: SDUPTHER

## 2020-06-01 ENCOUNTER — TELEPHONE (OUTPATIENT)
Dept: FAMILY MEDICINE CLINIC | Facility: CLINIC | Age: 60
End: 2020-06-01

## 2020-06-16 DIAGNOSIS — F98.8 ATTENTION DEFICIT DISORDER (ADD) WITHOUT HYPERACTIVITY: ICD-10-CM

## 2020-06-18 RX ORDER — DEXTROAMPHETAMINE SACCHARATE, AMPHETAMINE ASPARTATE, DEXTROAMPHETAMINE SULFATE AND AMPHETAMINE SULFATE 7.5; 7.5; 7.5; 7.5 MG/1; MG/1; MG/1; MG/1
30 TABLET ORAL 2 TIMES DAILY
Qty: 60 TABLET | Refills: 0 | Status: SHIPPED | OUTPATIENT
Start: 2020-06-18 | End: 2020-07-15 | Stop reason: SDUPTHER

## 2020-07-15 DIAGNOSIS — F98.8 ATTENTION DEFICIT DISORDER (ADD) WITHOUT HYPERACTIVITY: ICD-10-CM

## 2020-07-16 RX ORDER — DEXTROAMPHETAMINE SACCHARATE, AMPHETAMINE ASPARTATE, DEXTROAMPHETAMINE SULFATE AND AMPHETAMINE SULFATE 7.5; 7.5; 7.5; 7.5 MG/1; MG/1; MG/1; MG/1
30 TABLET ORAL 2 TIMES DAILY
Qty: 60 TABLET | Refills: 0 | Status: SHIPPED | OUTPATIENT
Start: 2020-07-16 | End: 2020-08-11 | Stop reason: SDUPTHER

## 2020-07-24 ENCOUNTER — OFFICE VISIT (OUTPATIENT)
Dept: FAMILY MEDICINE CLINIC | Facility: CLINIC | Age: 60
End: 2020-07-24
Payer: COMMERCIAL

## 2020-07-24 VITALS
HEART RATE: 90 BPM | BODY MASS INDEX: 48.48 KG/M2 | TEMPERATURE: 98.4 F | RESPIRATION RATE: 18 BRPM | WEIGHT: 273.6 LBS | SYSTOLIC BLOOD PRESSURE: 148 MMHG | HEIGHT: 63 IN | DIASTOLIC BLOOD PRESSURE: 86 MMHG

## 2020-07-24 DIAGNOSIS — J45.30 MILD PERSISTENT ASTHMA WITHOUT COMPLICATION: ICD-10-CM

## 2020-07-24 DIAGNOSIS — Z00.00 MEDICARE ANNUAL WELLNESS VISIT, SUBSEQUENT: Primary | ICD-10-CM

## 2020-07-24 DIAGNOSIS — I10 BENIGN ESSENTIAL HYPERTENSION: ICD-10-CM

## 2020-07-24 DIAGNOSIS — E66.01 MORBID OBESITY WITH BMI OF 45.0-49.9, ADULT (HCC): ICD-10-CM

## 2020-07-24 DIAGNOSIS — E03.9 HYPOTHYROIDISM, UNSPECIFIED TYPE: ICD-10-CM

## 2020-07-24 PROCEDURE — 1036F TOBACCO NON-USER: CPT | Performed by: FAMILY MEDICINE

## 2020-07-24 PROCEDURE — 3008F BODY MASS INDEX DOCD: CPT | Performed by: FAMILY MEDICINE

## 2020-07-24 PROCEDURE — G0439 PPPS, SUBSEQ VISIT: HCPCS | Performed by: FAMILY MEDICINE

## 2020-07-24 PROCEDURE — 3079F DIAST BP 80-89 MM HG: CPT | Performed by: FAMILY MEDICINE

## 2020-07-24 PROCEDURE — 99214 OFFICE O/P EST MOD 30 MIN: CPT | Performed by: FAMILY MEDICINE

## 2020-07-24 PROCEDURE — 3077F SYST BP >= 140 MM HG: CPT | Performed by: FAMILY MEDICINE

## 2020-07-24 RX ORDER — AMLODIPINE BESYLATE 10 MG/1
10 TABLET ORAL DAILY
Qty: 90 TABLET | Refills: 3 | Status: SHIPPED | OUTPATIENT
Start: 2020-07-24 | End: 2021-04-09

## 2020-07-24 RX ORDER — LEVOTHYROXINE SODIUM 0.1 MG/1
TABLET ORAL
COMMUNITY
End: 2020-09-25 | Stop reason: SDUPTHER

## 2020-07-24 RX ORDER — ALBUTEROL SULFATE 90 UG/1
2 AEROSOL, METERED RESPIRATORY (INHALATION) EVERY 6 HOURS PRN
Qty: 1 INHALER | Refills: 5 | Status: SHIPPED | OUTPATIENT
Start: 2020-07-24 | End: 2020-08-18 | Stop reason: SDUPTHER

## 2020-07-24 NOTE — PROGRESS NOTES
Assessment and Plan:    Problem List Items Addressed This Visit        Endocrine    Hypothyroidism     Lab stable         Relevant Medications    levothyroxine (Synthroid) 100 mcg tablet       Respiratory    Mild intermittent asthma without complication    Relevant Medications    albuterol (PROVENTIL HFA,VENTOLIN HFA) 90 mcg/act inhaler       Cardiovascular and Mediastinum    Benign essential hypertension     BP stable         Relevant Medications    amLODIPine (NORVASC) 10 mg tablet      Other Visit Diagnoses     Medicare annual wellness visit, subsequent    -  Primary                 Diagnoses and all orders for this visit:    Medicare annual wellness visit, subsequent    Mild persistent asthma without complication  -     albuterol (PROVENTIL HFA,VENTOLIN HFA) 90 mcg/act inhaler; Inhale 2 puffs every 6 (six) hours as needed for wheezing or shortness of breath    Benign essential hypertension  -     amLODIPine (NORVASC) 10 mg tablet; Take 1 tablet (10 mg total) by mouth daily    Hypothyroidism, unspecified type    Other orders  -     calcium citrate-Vitamin D (Calcium Citrate + D3) 200 mg-250 units; Take by mouth  -     levothyroxine (Synthroid) 100 mcg tablet; Take by mouth              Subjective:      Patient ID: Ky Nava is a 56 y o  female  CC:    Chief Complaint   Patient presents with   [facility] Wellness Visit    post nasal drip     pt would like a rescue inhaler       HPI:    Here for post nasal drip/allergies  Would like rescue inhaler, knee replacement starting to bother her, occ dizzy spells, hands hurt-ortho did shots in October      The following portions of the patient's history were reviewed and updated as appropriate: allergies, current medications, past family history, past medical history, past social history, past surgical history and problem list      Review of Systems   Constitutional: Negative for activity change, appetite change, chills, fatigue and fever     HENT: Positive for congestion and postnasal drip  Respiratory: Positive for cough  Negative for shortness of breath  Cardiovascular: Negative for chest pain  Musculoskeletal: Positive for arthralgias  Neurological: Positive for dizziness  Negative for numbness and headaches  Data to review:       Objective:    Vitals:    07/24/20 1135   BP: 148/86   BP Location: Left arm   Patient Position: Sitting   Cuff Size: Adult   Pulse: 90   Resp: 18   Temp: 98 4 °F (36 9 °C)   TempSrc: Tympanic   Weight: 124 kg (273 lb 9 6 oz)   Height: 5' 3" (1 6 m)        Physical Exam   Constitutional: She appears well-developed and well-nourished  Neck: No thyromegaly present  Cardiovascular: Normal rate, regular rhythm, normal heart sounds and intact distal pulses  Pulmonary/Chest: Effort normal and breath sounds normal    Musculoskeletal: She exhibits no edema  Lymphadenopathy:     She has no cervical adenopathy  Vitals reviewed  BMI Counseling: Body mass index is 48 47 kg/m²  The BMI is above normal  Nutrition recommendations include reducing portion sizes, decreasing overall calorie intake, 3-5 servings of fruits/vegetables daily, reducing fast food intake and consuming healthier snacks  Exercise recommendations include exercising 3-5 times per week
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
## 2020-07-24 NOTE — PATIENT INSTRUCTIONS
rec zyrtec for allergies as well as inhaler  Obesity   AMBULATORY CARE:   Obesity  is when your body mass index (BMI) is greater than 30  Your healthcare provider will use your height and weight to measure your BMI  The risks of obesity include  many health problems, such as injuries or physical disability  You may need tests to check for the following:  · Diabetes     · High blood pressure or high cholesterol     · Heart disease     · Gallbladder or liver disease     · Cancer of the colon, breast, prostate, liver, or kidney     · Sleep apnea     · Arthritis or gout  Seek care immediately if:   · You have a severe headache, confusion, or difficulty speaking  · You have weakness on one side of your body  · You have chest pain, sweating, or shortness of breath  Contact your healthcare provider if:   · You have symptoms of gallbladder or liver disease, such as pain in your upper abdomen  · You have knee or hip pain and discomfort while walking  · You have symptoms of diabetes, such as intense hunger and thirst, and frequent urination  · You have symptoms of sleep apnea, such as snoring or daytime sleepiness  · You have questions or concerns about your condition or care  Treatment for obesity  focuses on helping you lose weight to improve your health  Even a small decrease in BMI can reduce the risk for many health problems  Your healthcare provider will help you set a weight-loss goal   · Lifestyle changes  are the first step in treating obesity  These include making healthy food choices and getting regular physical activity  Your healthcare provider may suggest a weight-loss program that involves coaching, education, and therapy  · Medicine  may help you lose weight when it is used with a healthy diet and physical activity  · Surgery  can help you lose weight if you are very obese and have other health problems  There are several types of weight-loss surgery   Ask your healthcare provider for more information  Be successful losing weight:   · Set small, realistic goals  An example of a small goal is to walk for 20 minutes 5 days a week  Anther goal is to lose 5% of your body weight  · Tell friends, family members, and coworkers about your goals  and ask for their support  Ask a friend to lose weight with you, or join a weight-loss support group  · Identify foods or triggers that may cause you to overeat , and find ways to avoid them  Remove tempting high-calorie foods from your home and workplace  Place a bowl of fresh fruit on your kitchen counter  If stress causes you to eat, then find other ways to cope with stress  · Keep a diary to track what you eat and drink  Also write down how many minutes of physical activity you do each day  Weigh yourself once a week and record it in your diary  Eating changes: You will need to eat 500 to 1,000 fewer calories each day than you currently eat to lose 1 to 2 pounds a week  The following changes will help you cut calories:  · Eat smaller portions  Use small plates, no larger than 9 inches in diameter  Fill your plate half full of fruits and vegetables  Measure your food using measuring cups until you know what a serving size looks like  · Eat 3 meals and 1 or 2 snacks each day  Plan your meals in advance  Gissell Brumfield and eat at home most of the time  Eat slowly  · Eat fruits and vegetables at every meal   They are low in calories and high in fiber, which makes you feel full  Do not add butter, margarine, or cream sauce to vegetables  Use herbs to season steamed vegetables  · Eat less fat and fewer fried foods  Eat more baked or grilled chicken and fish  These protein sources are lower in calories and fat than red meat  Limit fast food  Dress your salads with olive oil and vinegar instead of bottled dressing  · Limit the amount of sugar you eat  Do not drink sugary beverages  Limit alcohol    Activity changes:  Physical activity is good for your body in many ways  It helps you burn calories and build strong muscles  It decreases stress and depression, and improves your mood  It can also help you sleep better  Talk to your healthcare provider before you begin an exercise program   · Exercise for at least 30 minutes 5 days a week  Start slowly  Set aside time each day for physical activity that you enjoy and that is convenient for you  It is best to do both weight training and an activity that increases your heart rate, such as walking, bicycling, or swimming  · Find ways to be more active  Do yard work and housecleaning  Walk up the stairs instead of using elevators  Spend your leisure time going to events that require walking, such as outdoor festivals or fairs  This extra physical activity can help you lose weight and keep it off  Follow up with your healthcare provider as directed: You may need to meet with a dietitian  Write down your questions so you remember to ask them during your visits  © 2017 [address] [employer] Information is for End User's use only and may not be sold, redistributed or otherwise used for commercial purposes  All illustrations and images included in CareNotes® are the copyrighted property of A D [employer] M [employer]  or [employer] Koe[employer]  The above information is an  only  It is not intended as medical advice for individual conditions or treatments  Talk to your doctor, nurse or pharmacist before following any medical regimen to see if it is safe and effective for you  Weight Management   AMBULATORY CARE:   Why it is important to manage your weight:  Being overweight increases your risk of health conditions such as heart disease, high blood pressure, type 2 diabetes, and certain types of cancer  It can also increase your risk for osteoarthritis, sleep apnea, and other respiratory problems  Aim for a slow, steady weight loss   Even a small amount of weight loss can lower your risk of health problems  How to lose weight safely:  A safe and healthy way to lose weight is to eat fewer calories and get regular exercise  You can lose up about 1 pound a week by decreasing the number of calories you eat by 500 calories each day  You can decrease calories by eating smaller portion sizes or by cutting out high-calorie foods  Read labels to find out how many calories are in the foods you eat  You can also burn calories with exercise such as walking, swimming, or biking  You will be more likely to keep weight off if you make these changes part of your lifestyle  Healthy meal plan for weight management:  A healthy meal plan includes a variety of foods, contains fewer calories, and helps you stay healthy  A healthy meal plan includes the following:  · Eat whole-grain foods more often  A healthy meal plan should contain fiber  Fiber is the part of grains, fruits, and vegetables that is not broken down by your body  Whole-grain foods are healthy and provide extra fiber in your diet  Some examples of whole-grain foods are whole-wheat breads and pastas, oatmeal, brown rice, and bulgur  · Eat a variety of vegetables every day  Include dark, leafy greens such as spinach, kale, acacia greens, and mustard greens  Eat yellow and orange vegetables such as carrots, sweet potatoes, and winter squash  · Eat a variety of fruits every day  Choose fresh or canned fruit (canned in its own juice or light syrup) instead of juice  Fruit juice has very little or no fiber  · Eat low-fat dairy foods  Drink fat-free (skim) milk or 1% milk  Eat fat-free yogurt and low-fat cottage cheese  Try low-fat cheeses such as mozzarella and other reduced-fat cheeses  · Choose meat and other protein foods that are low in fat  Choose beans or other legumes such as split peas or lentils  Choose fish, skinless poultry (chicken or turkey), or lean cuts of red meat (beef or pork)   Before you cook meat or poultry, cut off any visible fat  · Use less fat and oil  Try baking foods instead of frying them  Add less fat, such as margarine, sour cream, regular salad dressing and mayonnaise to foods  Eat fewer high-fat foods  Some examples of high-fat foods include french fries, doughnuts, ice cream, and cakes  · Eat fewer sweets  Limit foods and drinks that are high in sugar  This includes candy, cookies, regular soda, and sweetened drinks  Ways to decrease calories:   · Eat smaller portions  ¨ Use a small plate with smaller servings  ¨ Do not eat second helpings  ¨ When you eat at a restaurant, ask for a box and place half of your meal in the box before you eat  ¨ Share an entrée with someone else  · Replace high-calorie snacks with healthy, low-calorie snacks  ¨ Choose fresh fruit, vegetables, fat-free rice cakes, or air-popped popcorn instead of potato chips, nuts, or chocolate  ¨ Choose water or calorie-free drinks instead of soda or sweetened drinks  · Eat regular meals  Skipping meals can lead to overeating later in the day  Eat a healthy snack in place of a meal if you do not have time to eat a regular meal      · Do not shop for groceries when you are hungry  You may be more likely to make unhealthy food choices  Take a grocery list of healthy foods and shop after you have eaten  Exercise:  Exercise at least 30 minutes per day on most days of the week  Some examples of exercise include walking, biking, dancing, and swimming  You can also fit in more physical activity by taking the stairs instead of the elevator or parking farther away from stores  Ask your healthcare provider about the best exercise plan for you  Other things to consider as you try to lose weight:   · Be aware of situations that may give you the urge to overeat, such as eating while watching television  Find ways to avoid these situations  For example, read a book, go for a walk, or do crafts      · Meet with a weight loss support group or friends who are also trying to lose weight  This may help you stay motivated to continue working on your weight loss goals  © 2017 [employer] [employer] Information is for End User's use only and may not be sold, redistributed or otherwise used for commercial purposes  All illustrations and images included in CareNotes® are the copyrighted property of [employer] M [employer] Inc  or [employer] Koehler  The above information is an  only  It is not intended as medical advice for individual conditions or treatments  Talk to your doctor, nurse or pharmacist before following any medical regimen to see if it is safe and effective for you

## 2020-07-31 ENCOUNTER — TELEPHONE (OUTPATIENT)
Dept: FAMILY MEDICINE CLINIC | Facility: CLINIC | Age: 60
End: 2020-07-31

## 2020-07-31 NOTE — TELEPHONE ENCOUNTER
PT'S  CALLED IS ASKING FOR A CALL BACK REGARDING PT'S B/W RESULTS BACK FROM February  HE STATES THAT SHE NEVER GOT A CALL  PLEASE CALL  THANK YOU

## 2020-08-01 NOTE — TELEPHONE ENCOUNTER
I called and lmom for  that the labs were to be reviewed at 2/4/2020 OV with Terence Oro as her note states

## 2020-08-11 DIAGNOSIS — F98.8 ATTENTION DEFICIT DISORDER (ADD) WITHOUT HYPERACTIVITY: ICD-10-CM

## 2020-08-11 RX ORDER — DEXTROAMPHETAMINE SACCHARATE, AMPHETAMINE ASPARTATE, DEXTROAMPHETAMINE SULFATE AND AMPHETAMINE SULFATE 7.5; 7.5; 7.5; 7.5 MG/1; MG/1; MG/1; MG/1
30 TABLET ORAL 2 TIMES DAILY
Qty: 60 TABLET | Refills: 0 | Status: SHIPPED | OUTPATIENT
Start: 2020-08-11 | End: 2020-09-10 | Stop reason: SDUPTHER

## 2020-08-18 DIAGNOSIS — J45.30 MILD PERSISTENT ASTHMA WITHOUT COMPLICATION: ICD-10-CM

## 2020-08-18 RX ORDER — ALBUTEROL SULFATE 90 UG/1
2 AEROSOL, METERED RESPIRATORY (INHALATION) EVERY 6 HOURS PRN
Qty: 3 INHALER | Refills: 3 | Status: SHIPPED | OUTPATIENT
Start: 2020-08-18 | End: 2020-09-30 | Stop reason: SDUPTHER

## 2020-09-03 DIAGNOSIS — F41.9 ANXIETY: ICD-10-CM

## 2020-09-04 RX ORDER — LORAZEPAM 2 MG/1
TABLET ORAL
Qty: 270 TABLET | Refills: 0 | Status: SHIPPED | OUTPATIENT
Start: 2020-09-04 | End: 2021-02-05 | Stop reason: SDUPTHER

## 2020-09-10 DIAGNOSIS — F98.8 ATTENTION DEFICIT DISORDER (ADD) WITHOUT HYPERACTIVITY: ICD-10-CM

## 2020-09-11 RX ORDER — DEXTROAMPHETAMINE SACCHARATE, AMPHETAMINE ASPARTATE, DEXTROAMPHETAMINE SULFATE AND AMPHETAMINE SULFATE 7.5; 7.5; 7.5; 7.5 MG/1; MG/1; MG/1; MG/1
30 TABLET ORAL 2 TIMES DAILY
Qty: 60 TABLET | Refills: 0 | Status: SHIPPED | OUTPATIENT
Start: 2020-09-11 | End: 2020-10-08 | Stop reason: SDUPTHER

## 2020-09-11 NOTE — TELEPHONE ENCOUNTER
Pt called in to request a refill for her adderall, if possible please send to pt pharmacy, thank you!

## 2020-09-24 DIAGNOSIS — F41.9 ANXIETY: ICD-10-CM

## 2020-09-24 DIAGNOSIS — F32.A DEPRESSION, UNSPECIFIED DEPRESSION TYPE: ICD-10-CM

## 2020-09-24 DIAGNOSIS — F31.78 BIPOLAR 1 DISORDER, MIXED, FULL REMISSION (HCC): ICD-10-CM

## 2020-09-24 RX ORDER — QUETIAPINE FUMARATE 100 MG/1
100 TABLET, FILM COATED ORAL
Qty: 90 TABLET | Refills: 1 | Status: SHIPPED | OUTPATIENT
Start: 2020-09-24 | End: 2021-03-17 | Stop reason: SDUPTHER

## 2020-09-24 RX ORDER — FLUOXETINE HYDROCHLORIDE 20 MG/1
CAPSULE ORAL
Qty: 180 CAPSULE | Refills: 3 | OUTPATIENT
Start: 2020-09-24

## 2020-09-24 RX ORDER — FLUOXETINE HYDROCHLORIDE 20 MG/1
40 CAPSULE ORAL DAILY
Qty: 180 CAPSULE | Refills: 1 | Status: SHIPPED | OUTPATIENT
Start: 2020-09-24 | End: 2021-03-17 | Stop reason: SDUPTHER

## 2020-09-24 RX ORDER — CLONAZEPAM 1 MG/1
TABLET ORAL
Qty: 135 TABLET | OUTPATIENT
Start: 2020-09-24

## 2020-09-24 RX ORDER — CLONAZEPAM 1 MG/1
TABLET ORAL
Qty: 135 TABLET | Refills: 1 | Status: SHIPPED | OUTPATIENT
Start: 2020-09-24 | End: 2021-06-15 | Stop reason: SDUPTHER

## 2020-09-24 RX ORDER — QUETIAPINE FUMARATE 100 MG/1
TABLET, FILM COATED ORAL
Qty: 90 TABLET | Refills: 3 | OUTPATIENT
Start: 2020-09-24

## 2020-09-25 DIAGNOSIS — E03.9 HYPOTHYROIDISM, UNSPECIFIED TYPE: Primary | ICD-10-CM

## 2020-09-26 DIAGNOSIS — E03.9 HYPOTHYROIDISM, UNSPECIFIED TYPE: ICD-10-CM

## 2020-09-26 RX ORDER — LEVOTHYROXINE SODIUM 0.1 MG/1
100 TABLET ORAL DAILY
Qty: 30 TABLET | Refills: 2 | Status: SHIPPED | OUTPATIENT
Start: 2020-09-26 | End: 2020-09-26

## 2020-09-26 RX ORDER — LEVOTHYROXINE SODIUM 0.1 MG/1
TABLET ORAL
Qty: 90 TABLET | Refills: 1 | Status: SHIPPED | OUTPATIENT
Start: 2020-09-26 | End: 2020-11-05 | Stop reason: SDUPTHER

## 2020-09-30 ENCOUNTER — TELEPHONE (OUTPATIENT)
Dept: FAMILY MEDICINE CLINIC | Facility: CLINIC | Age: 60
End: 2020-09-30

## 2020-09-30 DIAGNOSIS — J45.30 MILD PERSISTENT ASTHMA WITHOUT COMPLICATION: ICD-10-CM

## 2020-09-30 RX ORDER — ALBUTEROL SULFATE 90 UG/1
2 AEROSOL, METERED RESPIRATORY (INHALATION) EVERY 6 HOURS PRN
Qty: 3 INHALER | Refills: 3 | Status: SHIPPED | OUTPATIENT
Start: 2020-09-30 | End: 2020-11-05 | Stop reason: SDUPTHER

## 2020-09-30 NOTE — TELEPHONE ENCOUNTER
MF, Pts  called to let us know that the reason why pts inhaler was not working is because it was empty so now pt does not need the old inhaler called into pharmacy

## 2020-09-30 NOTE — TELEPHONE ENCOUNTER
MF, Pts  requesting pt to be changed from Pro-Air back to Ventolin, Pt states the Pro-Air is not working as well, Pt uses [facility]

## 2020-10-05 ENCOUNTER — TELEPHONE (OUTPATIENT)
Dept: FAMILY MEDICINE CLINIC | Facility: CLINIC | Age: 60
End: 2020-10-05

## 2020-10-08 DIAGNOSIS — F98.8 ATTENTION DEFICIT DISORDER (ADD) WITHOUT HYPERACTIVITY: ICD-10-CM

## 2020-10-08 RX ORDER — DEXTROAMPHETAMINE SACCHARATE, AMPHETAMINE ASPARTATE, DEXTROAMPHETAMINE SULFATE AND AMPHETAMINE SULFATE 7.5; 7.5; 7.5; 7.5 MG/1; MG/1; MG/1; MG/1
30 TABLET ORAL 2 TIMES DAILY
Qty: 60 TABLET | Refills: 0 | Status: SHIPPED | OUTPATIENT
Start: 2020-10-08 | End: 2020-11-09 | Stop reason: SDUPTHER

## 2020-10-15 ENCOUNTER — OFFICE VISIT (OUTPATIENT)
Dept: GASTROENTEROLOGY | Facility: CLINIC | Age: 60
End: 2020-10-15
Payer: COMMERCIAL

## 2020-10-15 VITALS
WEIGHT: 280 LBS | DIASTOLIC BLOOD PRESSURE: 81 MMHG | SYSTOLIC BLOOD PRESSURE: 156 MMHG | HEIGHT: 63 IN | BODY MASS INDEX: 49.61 KG/M2 | HEART RATE: 108 BPM | TEMPERATURE: 98.6 F

## 2020-10-15 DIAGNOSIS — Z12.11 COLON CANCER SCREENING: ICD-10-CM

## 2020-10-15 DIAGNOSIS — K22.2 SCHATZKI'S RING: ICD-10-CM

## 2020-10-15 DIAGNOSIS — R13.19 ESOPHAGEAL DYSPHAGIA: Primary | ICD-10-CM

## 2020-10-15 DIAGNOSIS — Z78.9 HEPATITIS C ANTIBODY TEST NEGATIVE: ICD-10-CM

## 2020-10-15 DIAGNOSIS — K44.9 HIATAL HERNIA: ICD-10-CM

## 2020-10-15 PROCEDURE — 99204 OFFICE O/P NEW MOD 45 MIN: CPT | Performed by: INTERNAL MEDICINE

## 2020-10-21 ENCOUNTER — ANESTHESIA EVENT (OUTPATIENT)
Dept: GASTROENTEROLOGY | Facility: HOSPITAL | Age: 60
End: 2020-10-21

## 2020-10-21 RX ORDER — SODIUM CHLORIDE 9 MG/ML
125 INJECTION, SOLUTION INTRAVENOUS CONTINUOUS
Status: CANCELLED | OUTPATIENT
Start: 2020-10-21

## 2020-10-22 ENCOUNTER — ANESTHESIA (OUTPATIENT)
Dept: GASTROENTEROLOGY | Facility: HOSPITAL | Age: 60
End: 2020-10-22

## 2020-10-22 ENCOUNTER — HOSPITAL ENCOUNTER (OUTPATIENT)
Dept: GASTROENTEROLOGY | Facility: HOSPITAL | Age: 60
Setting detail: OUTPATIENT SURGERY
Discharge: HOME | End: 2020-10-22
Attending: INTERNAL MEDICINE
Payer: COMMERCIAL

## 2020-10-22 VITALS
WEIGHT: 280 LBS | BODY MASS INDEX: 49.61 KG/M2 | DIASTOLIC BLOOD PRESSURE: 73 MMHG | HEART RATE: 93 BPM | HEIGHT: 63 IN | RESPIRATION RATE: 25 BRPM | OXYGEN SATURATION: 92 % | SYSTOLIC BLOOD PRESSURE: 149 MMHG

## 2020-10-22 VITALS — HEART RATE: 92 BPM

## 2020-10-22 DIAGNOSIS — K22.2 SCHATZKI'S RING: ICD-10-CM

## 2020-10-22 DIAGNOSIS — K44.9 HIATAL HERNIA: ICD-10-CM

## 2020-10-22 DIAGNOSIS — R13.19 ESOPHAGEAL DYSPHAGIA: ICD-10-CM

## 2020-10-22 PROCEDURE — 43248 EGD GUIDE WIRE INSERTION: CPT | Performed by: INTERNAL MEDICINE

## 2020-10-22 PROCEDURE — C1769 GUIDE WIRE: HCPCS

## 2020-10-22 PROCEDURE — 88305 TISSUE EXAM BY PATHOLOGIST: CPT | Performed by: PATHOLOGY

## 2020-10-22 PROCEDURE — 43239 EGD BIOPSY SINGLE/MULTIPLE: CPT | Performed by: INTERNAL MEDICINE

## 2020-10-22 RX ORDER — PANTOPRAZOLE SODIUM 40 MG/1
40 TABLET, DELAYED RELEASE ORAL DAILY
Qty: 30 TABLET | Refills: 5 | Status: SHIPPED | OUTPATIENT
Start: 2020-10-22 | End: 2020-10-22

## 2020-10-22 RX ORDER — SODIUM CHLORIDE 9 MG/ML
INJECTION, SOLUTION INTRAVENOUS CONTINUOUS PRN
Status: DISCONTINUED | OUTPATIENT
Start: 2020-10-22 | End: 2020-10-22

## 2020-10-22 RX ORDER — PANTOPRAZOLE SODIUM 40 MG/1
TABLET, DELAYED RELEASE ORAL
Qty: 90 TABLET | Refills: 3 | Status: SHIPPED | OUTPATIENT
Start: 2020-10-22 | End: 2020-10-26 | Stop reason: SDUPTHER

## 2020-10-22 RX ORDER — PROPOFOL 10 MG/ML
INJECTION, EMULSION INTRAVENOUS AS NEEDED
Status: DISCONTINUED | OUTPATIENT
Start: 2020-10-22 | End: 2020-10-22

## 2020-10-22 RX ADMIN — PROPOFOL 120 MG: 10 INJECTION, EMULSION INTRAVENOUS at 10:20

## 2020-10-22 RX ADMIN — PROPOFOL 20 MG: 10 INJECTION, EMULSION INTRAVENOUS at 10:22

## 2020-10-22 RX ADMIN — SODIUM CHLORIDE: 0.9 INJECTION, SOLUTION INTRAVENOUS at 10:17

## 2020-10-22 RX ADMIN — PROPOFOL 60 MG: 10 INJECTION, EMULSION INTRAVENOUS at 10:24

## 2020-10-26 RX ORDER — PANTOPRAZOLE SODIUM 40 MG/1
40 TABLET, DELAYED RELEASE ORAL DAILY
Qty: 90 TABLET | Refills: 3 | Status: SHIPPED | OUTPATIENT
Start: 2020-10-26 | End: 2021-08-04

## 2020-10-30 ENCOUNTER — TELEPHONE (OUTPATIENT)
Dept: GASTROENTEROLOGY | Facility: CLINIC | Age: 60
End: 2020-10-30

## 2020-11-05 DIAGNOSIS — E03.9 HYPOTHYROIDISM, UNSPECIFIED TYPE: ICD-10-CM

## 2020-11-05 DIAGNOSIS — J45.30 MILD PERSISTENT ASTHMA WITHOUT COMPLICATION: ICD-10-CM

## 2020-11-05 RX ORDER — LEVOTHYROXINE SODIUM 0.1 MG/1
100 TABLET ORAL DAILY
Qty: 90 TABLET | Refills: 1 | Status: SHIPPED | OUTPATIENT
Start: 2020-11-05 | End: 2021-03-23

## 2020-11-05 RX ORDER — ALBUTEROL SULFATE 90 UG/1
2 AEROSOL, METERED RESPIRATORY (INHALATION) EVERY 6 HOURS PRN
Qty: 3 INHALER | Refills: 3 | Status: SHIPPED | OUTPATIENT
Start: 2020-11-05 | End: 2021-03-11 | Stop reason: SDUPTHER

## 2020-11-09 ENCOUNTER — OFFICE VISIT (OUTPATIENT)
Dept: FAMILY MEDICINE CLINIC | Facility: CLINIC | Age: 60
End: 2020-11-09
Payer: COMMERCIAL

## 2020-11-09 VITALS
WEIGHT: 281 LBS | SYSTOLIC BLOOD PRESSURE: 148 MMHG | RESPIRATION RATE: 16 BRPM | HEIGHT: 63 IN | HEART RATE: 96 BPM | BODY MASS INDEX: 49.79 KG/M2 | TEMPERATURE: 98 F | DIASTOLIC BLOOD PRESSURE: 94 MMHG

## 2020-11-09 DIAGNOSIS — K44.9 HIATAL HERNIA: Primary | ICD-10-CM

## 2020-11-09 DIAGNOSIS — B36.9 FUNGAL DERMATITIS: ICD-10-CM

## 2020-11-09 DIAGNOSIS — I10 BENIGN ESSENTIAL HYPERTENSION: ICD-10-CM

## 2020-11-09 DIAGNOSIS — E78.5 HYPERLIPIDEMIA, UNSPECIFIED HYPERLIPIDEMIA TYPE: ICD-10-CM

## 2020-11-09 DIAGNOSIS — R63.5 WEIGHT GAIN: ICD-10-CM

## 2020-11-09 DIAGNOSIS — F98.8 ATTENTION DEFICIT DISORDER (ADD) WITHOUT HYPERACTIVITY: ICD-10-CM

## 2020-11-09 DIAGNOSIS — R13.10 DYSPHAGIA, UNSPECIFIED TYPE: ICD-10-CM

## 2020-11-09 DIAGNOSIS — E03.9 HYPOTHYROIDISM, UNSPECIFIED TYPE: ICD-10-CM

## 2020-11-09 DIAGNOSIS — F41.9 ANXIETY: ICD-10-CM

## 2020-11-09 DIAGNOSIS — K44.9 PARAESOPHAGEAL HIATAL HERNIA: ICD-10-CM

## 2020-11-09 PROCEDURE — 99214 OFFICE O/P EST MOD 30 MIN: CPT | Performed by: FAMILY MEDICINE

## 2020-11-09 PROCEDURE — 80307 DRUG TEST PRSMV CHEM ANLYZR: CPT | Performed by: FAMILY MEDICINE

## 2020-11-09 RX ORDER — DEXTROAMPHETAMINE SACCHARATE, AMPHETAMINE ASPARTATE, DEXTROAMPHETAMINE SULFATE AND AMPHETAMINE SULFATE 7.5; 7.5; 7.5; 7.5 MG/1; MG/1; MG/1; MG/1
30 TABLET ORAL 2 TIMES DAILY
Qty: 60 TABLET | Refills: 0 | Status: SHIPPED | OUTPATIENT
Start: 2020-11-09 | End: 2020-12-08 | Stop reason: SDUPTHER

## 2020-11-09 RX ORDER — DEXTROAMPHETAMINE SACCHARATE, AMPHETAMINE ASPARTATE, DEXTROAMPHETAMINE SULFATE AND AMPHETAMINE SULFATE 7.5; 7.5; 7.5; 7.5 MG/1; MG/1; MG/1; MG/1
30 TABLET ORAL 2 TIMES DAILY
Qty: 60 TABLET | Refills: 0 | Status: SHIPPED | OUTPATIENT
Start: 2020-11-09 | End: 2020-11-09 | Stop reason: SDUPTHER

## 2020-11-09 RX ORDER — CLOTRIMAZOLE AND BETAMETHASONE DIPROPIONATE 10; .64 MG/G; MG/G
CREAM TOPICAL 2 TIMES DAILY
Qty: 30 G | Refills: 0 | Status: SHIPPED | OUTPATIENT
Start: 2020-11-09 | End: 2021-08-04

## 2020-11-19 LAB
AMPHETAMINES UR QL SCN: POSITIVE
BARBITURATES UR QL SCN: NEGATIVE NG/ML
BENZODIAZ UR QL: NEGATIVE NG/ML
BZE UR QL: NEGATIVE NG/ML
CANNABINOIDS UR QL SCN: NEGATIVE NG/ML
METHADONE UR QL SCN: NEGATIVE NG/ML
OPIATES UR QL: NEGATIVE NG/ML
PCP UR QL: NEGATIVE NG/ML
PROPOXYPH UR QL SCN: NEGATIVE NG/ML

## 2020-12-08 ENCOUNTER — TELEPHONE (OUTPATIENT)
Dept: FAMILY MEDICINE CLINIC | Facility: CLINIC | Age: 60
End: 2020-12-08

## 2020-12-08 DIAGNOSIS — F98.8 ATTENTION DEFICIT DISORDER (ADD) WITHOUT HYPERACTIVITY: ICD-10-CM

## 2020-12-08 RX ORDER — DEXTROAMPHETAMINE SACCHARATE, AMPHETAMINE ASPARTATE, DEXTROAMPHETAMINE SULFATE AND AMPHETAMINE SULFATE 7.5; 7.5; 7.5; 7.5 MG/1; MG/1; MG/1; MG/1
30 TABLET ORAL 2 TIMES DAILY
Qty: 60 TABLET | Refills: 0 | Status: SHIPPED | OUTPATIENT
Start: 2020-12-08 | End: 2021-01-04 | Stop reason: SDUPTHER

## 2021-01-04 DIAGNOSIS — F98.8 ATTENTION DEFICIT DISORDER (ADD) WITHOUT HYPERACTIVITY: ICD-10-CM

## 2021-01-04 RX ORDER — DEXTROAMPHETAMINE SACCHARATE, AMPHETAMINE ASPARTATE, DEXTROAMPHETAMINE SULFATE AND AMPHETAMINE SULFATE 7.5; 7.5; 7.5; 7.5 MG/1; MG/1; MG/1; MG/1
30 TABLET ORAL 2 TIMES DAILY
Qty: 60 TABLET | Refills: 0 | Status: SHIPPED | OUTPATIENT
Start: 2021-01-04 | End: 2021-02-08 | Stop reason: SDUPTHER

## 2021-01-06 DIAGNOSIS — F41.9 ANXIETY: ICD-10-CM

## 2021-01-06 RX ORDER — LORAZEPAM 2 MG/1
2 TABLET ORAL EVERY 6 HOURS PRN
Qty: 270 TABLET | Refills: 0 | OUTPATIENT
Start: 2021-01-06

## 2021-01-18 ENCOUNTER — TELEPHONE (OUTPATIENT)
Dept: FAMILY MEDICINE CLINIC | Facility: CLINIC | Age: 61
End: 2021-01-18

## 2021-01-18 NOTE — TELEPHONE ENCOUNTER
Pt's  called to make Dr Solitario Todd aware that the pt's new pharmacy is now [facility]  They were having too many issues with [facility]; primarily that the medications were being delayed up to 2 weeks       Nina:  [address] [place] [address], [address] [place] [address]  P: [phone]

## 2021-02-03 DIAGNOSIS — F41.9 ANXIETY: ICD-10-CM

## 2021-02-03 NOTE — TELEPHONE ENCOUNTER
Pt's  called asking for a refill and also mentioned that they are very upset that patient was decreased from 4 tablets daily of her Lorazepam to 3  From the directions she has it looks like she can take 4 per day but this may be a quantity issue  Please advise and fill as appropriate

## 2021-02-03 NOTE — TELEPHONE ENCOUNTER
Pt on very high dose of Lorazepam at 2 four time per day, was trying to see if did okay on 3/day, does need to make f/u ov

## 2021-02-05 RX ORDER — LORAZEPAM 2 MG/1
2 TABLET ORAL EVERY 6 HOURS PRN
Qty: 360 TABLET | Refills: 0 | Status: SHIPPED | OUTPATIENT
Start: 2021-02-05 | End: 2021-05-04 | Stop reason: SDUPTHER

## 2021-02-05 NOTE — TELEPHONE ENCOUNTER
I added the RX and it is to go to the local [facility]  Please check the quantity  I don't know what quantity you would generally give them

## 2021-02-05 NOTE — TELEPHONE ENCOUNTER
I spoke to patient and her  and they both state she can not function properly on only 3 or 3 and 1/2 tablets  They notice memory issues, panic attacks out in public when they tried to decrease her  Please advise or fill as patient will be out later today

## 2021-02-08 DIAGNOSIS — F98.8 ATTENTION DEFICIT DISORDER (ADD) WITHOUT HYPERACTIVITY: ICD-10-CM

## 2021-02-08 RX ORDER — DEXTROAMPHETAMINE SACCHARATE, AMPHETAMINE ASPARTATE, DEXTROAMPHETAMINE SULFATE AND AMPHETAMINE SULFATE 7.5; 7.5; 7.5; 7.5 MG/1; MG/1; MG/1; MG/1
30 TABLET ORAL 2 TIMES DAILY
Qty: 60 TABLET | Refills: 0 | Status: SHIPPED | OUTPATIENT
Start: 2021-02-08 | End: 2021-03-04 | Stop reason: SDUPTHER

## 2021-02-27 DIAGNOSIS — J45.40 MODERATE PERSISTENT ASTHMA WITHOUT COMPLICATION: ICD-10-CM

## 2021-02-28 RX ORDER — MONTELUKAST SODIUM 10 MG/1
TABLET ORAL
Qty: 90 TABLET | Refills: 1 | Status: SHIPPED | OUTPATIENT
Start: 2021-02-28 | End: 2021-07-12

## 2021-03-03 ENCOUNTER — TELEPHONE (OUTPATIENT)
Dept: FAMILY MEDICINE CLINIC | Facility: CLINIC | Age: 61
End: 2021-03-03

## 2021-03-03 NOTE — TELEPHONE ENCOUNTER
Needs to make appt and then will send rx, if pt cancels or no shows this will be last rx until has appt

## 2021-03-03 NOTE — TELEPHONE ENCOUNTER
MF, Pts  requesting a refill for pts Adderall but it looks like pt needs an appt, May we refill or does pt need an appt?

## 2021-03-04 DIAGNOSIS — F98.8 ATTENTION DEFICIT DISORDER (ADD) WITHOUT HYPERACTIVITY: ICD-10-CM

## 2021-03-04 RX ORDER — DEXTROAMPHETAMINE SACCHARATE, AMPHETAMINE ASPARTATE, DEXTROAMPHETAMINE SULFATE AND AMPHETAMINE SULFATE 7.5; 7.5; 7.5; 7.5 MG/1; MG/1; MG/1; MG/1
30 TABLET ORAL 2 TIMES DAILY
Qty: 60 TABLET | Refills: 0 | Status: SHIPPED | OUTPATIENT
Start: 2021-03-04 | End: 2021-04-02 | Stop reason: SDUPTHER

## 2021-03-11 DIAGNOSIS — J45.30 MILD PERSISTENT ASTHMA WITHOUT COMPLICATION: ICD-10-CM

## 2021-03-11 RX ORDER — ALBUTEROL SULFATE 90 UG/1
2 AEROSOL, METERED RESPIRATORY (INHALATION) EVERY 6 HOURS PRN
Qty: 3 INHALER | Refills: 3 | Status: SHIPPED | OUTPATIENT
Start: 2021-03-11 | End: 2021-04-13 | Stop reason: CLARIF

## 2021-03-17 ENCOUNTER — OFFICE VISIT (OUTPATIENT)
Dept: FAMILY MEDICINE CLINIC | Facility: CLINIC | Age: 61
End: 2021-03-17
Payer: COMMERCIAL

## 2021-03-17 VITALS
HEIGHT: 63 IN | HEART RATE: 88 BPM | WEIGHT: 292.25 LBS | DIASTOLIC BLOOD PRESSURE: 86 MMHG | SYSTOLIC BLOOD PRESSURE: 144 MMHG | BODY MASS INDEX: 51.78 KG/M2 | TEMPERATURE: 97.8 F

## 2021-03-17 DIAGNOSIS — J45.30 MILD PERSISTENT ASTHMA WITHOUT COMPLICATION: ICD-10-CM

## 2021-03-17 DIAGNOSIS — F98.8 ATTENTION DEFICIT DISORDER (ADD) WITHOUT HYPERACTIVITY: ICD-10-CM

## 2021-03-17 DIAGNOSIS — F32.A DEPRESSION, UNSPECIFIED DEPRESSION TYPE: ICD-10-CM

## 2021-03-17 DIAGNOSIS — I10 BENIGN ESSENTIAL HYPERTENSION: ICD-10-CM

## 2021-03-17 DIAGNOSIS — K44.9 HIATAL HERNIA: ICD-10-CM

## 2021-03-17 DIAGNOSIS — E03.9 HYPOTHYROIDISM, UNSPECIFIED TYPE: ICD-10-CM

## 2021-03-17 DIAGNOSIS — F30.9 BIPOLAR I DISORDER, SINGLE MANIC EPISODE (HCC): ICD-10-CM

## 2021-03-17 DIAGNOSIS — E66.01 MORBID OBESITY (HCC): ICD-10-CM

## 2021-03-17 DIAGNOSIS — F31.78 BIPOLAR 1 DISORDER, MIXED, FULL REMISSION (HCC): ICD-10-CM

## 2021-03-17 DIAGNOSIS — E78.5 HYPERLIPIDEMIA, UNSPECIFIED HYPERLIPIDEMIA TYPE: ICD-10-CM

## 2021-03-17 DIAGNOSIS — Z12.31 ENCOUNTER FOR SCREENING MAMMOGRAM FOR MALIGNANT NEOPLASM OF BREAST: Primary | ICD-10-CM

## 2021-03-17 PROCEDURE — 99214 OFFICE O/P EST MOD 30 MIN: CPT | Performed by: FAMILY MEDICINE

## 2021-03-17 RX ORDER — QUETIAPINE FUMARATE 100 MG/1
100 TABLET, FILM COATED ORAL
Qty: 90 TABLET | Refills: 1 | Status: SHIPPED | OUTPATIENT
Start: 2021-03-17 | End: 2021-11-05 | Stop reason: SDUPTHER

## 2021-03-17 RX ORDER — IPRATROPIUM BROMIDE AND ALBUTEROL SULFATE 2.5; .5 MG/3ML; MG/3ML
3 SOLUTION RESPIRATORY (INHALATION) EVERY 6 HOURS PRN
Qty: 60 VIAL | Refills: 2 | Status: SHIPPED | OUTPATIENT
Start: 2021-03-17 | End: 2021-09-13

## 2021-03-17 RX ORDER — FLUOXETINE HYDROCHLORIDE 20 MG/1
40 CAPSULE ORAL DAILY
Qty: 180 CAPSULE | Refills: 1 | Status: SHIPPED | OUTPATIENT
Start: 2021-03-17 | End: 2021-09-14 | Stop reason: SDUPTHER

## 2021-03-17 NOTE — ASSESSMENT & PLAN NOTE
rec opinion from thoracic surgery since asthma acting up more despite no change is asthma provokers or no no change is asthma meds, ?  Hiatal hernia exacerbating asthma, also having dysphagia

## 2021-03-17 NOTE — PROGRESS NOTES
Assessment and Plan:    Problem List Items Addressed This Visit        Endocrine    Hypothyroidism     Await lab            Cardiovascular and Mediastinum    Benign essential hypertension     BP stable            Other    Attention deficit disorder (ADD)    Relevant Orders    Toxicology screen, urine    URINE,AMPHETAMINE CONFIRMATION    Bipolar I disorder, single manic episode ([facility] [address] )     Slightly more irritable         Hiatal hernia     rec opinion from thoracic surgery since asthma acting up more despite no change is asthma provokers or no no change is asthma meds, ? Hiatal hernia exacerbating asthma, also having dysphagia         Relevant Orders    Ambulatory referral to Thoracic Surgery    CBC and differential    Hyperlipidemia     Await lab         Morbid obesity ([facility] [address] )     counselled to lose           Other Visit Diagnoses     Encounter for screening mammogram for malignant neoplasm of breast    -  Primary    Relevant Orders    Mammo screening bilateral w 3d & cad    Bipolar 1 disorder, mixed, full remission ([facility] [address] )        Mild persistent asthma without complication        Relevant Medications    ipratropium-albuterol (DUO-NEB) 0 5-2 5 mg/3 mL nebulizer solution                 Diagnoses and all orders for this visit:    Encounter for screening mammogram for malignant neoplasm of breast  -     Mammo screening bilateral w 3d & cad; Future    Hiatal hernia  -     Ambulatory referral to Thoracic Surgery;  Future  -     CBC and differential; Future    Bipolar 1 disorder, mixed, full remission (HCC)    Attention deficit disorder (ADD) without hyperactivity  -     Toxicology screen, urine  -     URINE,AMPHETAMINE CONFIRMATION    Morbid obesity ([facility] [address] )    Bipolar I disorder, single manic episode ([facility] [address] )    Hyperlipidemia, unspecified hyperlipidemia type    Benign essential hypertension    Hypothyroidism, unspecified type    Mild persistent asthma without complication  -     ipratropium-albuterol (DUO-NEB) 0 5-2 5 mg/3 mL nebulizer solution; Take 1 vial (3 mL total) by nebulization every 6 (six) hours as needed for wheezing or shortness of breath              Subjective:      Patient ID: Luciano Estrada is a 59 y o  female  CC:    Chief Complaint   Patient presents with    Follow-up     for med refills  mgb       HPI:    HPI    The following portions of the patient's history were reviewed and updated as appropriate: allergies, current medications, past family history, past medical history, past social history, past surgical history and problem list     Review of Systems   Constitutional: Positive for unexpected weight change  Negative for activity change, appetite change, chills and fatigue  11 lb gain   Respiratory: Positive for cough and wheezing  Negative for shortness of breath  Cardiovascular: Negative for chest pain  Neurological: Negative for dizziness and headaches  Hematological: Negative for adenopathy  Data to review:       Objective:    Vitals:    03/17/21 0929   BP: 144/86   BP Location: Left arm   Patient Position: Sitting   Cuff Size: Large   Pulse: 88   Temp: 97 8 °F (36 6 °C)   TempSrc: Temporal   Weight: 133 kg (292 lb 4 oz)   Height: 5' 3" (1 6 m)        Physical Exam  Vitals signs reviewed  Constitutional:       Appearance: Normal appearance  She is obese  Cardiovascular:      Rate and Rhythm: Normal rate and regular rhythm  Pulses: Normal pulses  Heart sounds: Normal heart sounds  Pulmonary:      Effort: Pulmonary effort is normal       Breath sounds: Wheezing present  Musculoskeletal:      Right lower leg: No edema  Left lower leg: No edema  Lymphadenopathy:      Cervical: No cervical adenopathy  Neurological:      Mental Status: She is alert

## 2021-03-19 ENCOUNTER — TELEPHONE (OUTPATIENT)
Dept: HEMATOLOGY ONCOLOGY | Facility: CLINIC | Age: 61
End: 2021-03-19

## 2021-03-19 ENCOUNTER — TELEPHONE (OUTPATIENT)
Dept: SURGICAL ONCOLOGY | Facility: CLINIC | Age: 61
End: 2021-03-19

## 2021-03-19 NOTE — TELEPHONE ENCOUNTER
NEW PATIENT INTAKE   REFERRED TO [facility]    REFERRING PROVIDER'S NAME   Yesica Mark MD   NAME OF PERSON CALL AND THEIR RELATIONSHIP TO PATIENT Pete Patterson,    Florence [facility] OFFICE PHONE #   [phone]   REASON FOR REFERRAL/CONSULT     Hernia above her stomach in her tube at the bottom  Has issues swallowing  DID PATIENT HAVE TESTING COMPLETED? Endoscopy   FACILITY TESTING PERFORMED  (EX  [facility], [facility], ETC)     [facility] Maylin   IS INSURANCE REFERRAL NEEDED? BEST NUMBER TO REACH PATIENT   Home: [phone], leave a message and call cell  Cell: [phone]   COMMENTS:  Needs appt   From 2:30 on

## 2021-03-19 NOTE — TELEPHONE ENCOUNTER
Patient's  called-- stating that someone from the cancer care office (deysi) called his wife  Patient's pcp sent a ref to our office  Patient's  hung up before I could transfer the call

## 2021-03-19 NOTE — TELEPHONE ENCOUNTER
Patient has history of bariatric surgery  Patient should continue care with bariatric surgeon first for consultation

## 2021-03-23 DIAGNOSIS — E03.9 HYPOTHYROIDISM, UNSPECIFIED TYPE: ICD-10-CM

## 2021-03-23 RX ORDER — LEVOTHYROXINE SODIUM 0.1 MG/1
TABLET ORAL
Qty: 90 TABLET | Refills: 1 | Status: SHIPPED | OUTPATIENT
Start: 2021-03-23 | End: 2021-09-07

## 2021-03-25 DIAGNOSIS — J45.20 MILD INTERMITTENT ASTHMA WITHOUT COMPLICATION: Primary | ICD-10-CM

## 2021-03-29 ENCOUNTER — TELEPHONE (OUTPATIENT)
Dept: FAMILY MEDICINE CLINIC | Facility: CLINIC | Age: 61
End: 2021-03-29

## 2021-03-29 DIAGNOSIS — J45.20 MILD INTERMITTENT ASTHMA WITHOUT COMPLICATION: Primary | ICD-10-CM

## 2021-03-29 RX ORDER — SOFT LENS DISINFECTANT
SOLUTION, NON-ORAL MISCELLANEOUS
Qty: 1 EACH | Refills: 0 | Status: CANCELLED | OUTPATIENT
Start: 2021-03-29

## 2021-03-29 NOTE — TELEPHONE ENCOUNTER
ODETTE, Pt is requesting a new Nebulizer and supplies, I put in an order to you to sign and print so I can fax this  Did you get the order?

## 2021-04-02 DIAGNOSIS — F98.8 ATTENTION DEFICIT DISORDER (ADD) WITHOUT HYPERACTIVITY: ICD-10-CM

## 2021-04-02 RX ORDER — DEXTROAMPHETAMINE SACCHARATE, AMPHETAMINE ASPARTATE, DEXTROAMPHETAMINE SULFATE AND AMPHETAMINE SULFATE 7.5; 7.5; 7.5; 7.5 MG/1; MG/1; MG/1; MG/1
30 TABLET ORAL 2 TIMES DAILY
Qty: 60 TABLET | Refills: 0 | Status: SHIPPED | OUTPATIENT
Start: 2021-04-02 | End: 2021-05-04 | Stop reason: SDUPTHER

## 2021-04-07 ENCOUNTER — TELEPHONE (OUTPATIENT)
Dept: FAMILY MEDICINE CLINIC | Facility: CLINIC | Age: 61
End: 2021-04-07

## 2021-04-07 NOTE — TELEPHONE ENCOUNTER
PATIENT'S  CALLED, HE IS CONCERNED THAT PATIENT IS STILL COUGHING SINCE BEING SEEN BY DR Velma Connor TWO WEEKS AGO DESPITE HER USING THE NEBULIZER  HE IS AFRAID THAT THERE MAY BE SOMETHING ELSE GOING ON  WOULD LIKE TO KNOW IF DR Velma Connor WOULD PRESCRIBE SOMETHING FOR HER? SHE IS ALSO WHEEZING WHEN SHE OVER EXERTS HERSELF  PATIENT HAS A HISTORY OF ASTHMA     ASKS IF DR Velma Connor FEELS THE URGENCY FOR PATIENT TO COME IN WOULD SHE MAKE ROOM FOR HER IN THE SCHEDULE SO PATIENT DOES NOT HAVE TO SPEND THEIR TIME IN THE EMERGENCY ROOM  THEY ARE ASKING FOR PREDNISONE  PLEASE CALL [phone] THEY HAVE A MANDATORY APPOINTMENT WITH THE [facility] AUTHORITY SO THEY WILL BE AVAILABLE IN THE AFTERNOON OR ON Friday  I EXPLAINED THAT DR Velma Connor LEAVES EARLY ON THURSDAYS SO THEY UNDERSTAND THAT SHE WILL NOT BE HERE IN THE AFTERNOON  THEY UNDERSTAND AND ARE ASKING FOR PREDNISONE

## 2021-04-08 DIAGNOSIS — J45.20 MILD INTERMITTENT ASTHMA WITHOUT COMPLICATION: Primary | ICD-10-CM

## 2021-04-08 DIAGNOSIS — I10 BENIGN ESSENTIAL HYPERTENSION: ICD-10-CM

## 2021-04-08 RX ORDER — PREDNISONE 20 MG/1
20 TABLET ORAL 2 TIMES DAILY WITH MEALS
Qty: 10 TABLET | Refills: 0 | Status: SHIPPED | OUTPATIENT
Start: 2021-04-08 | End: 2021-04-15 | Stop reason: ALTCHOICE

## 2021-04-09 RX ORDER — AMLODIPINE BESYLATE 10 MG/1
TABLET ORAL
Qty: 90 TABLET | Refills: 1 | Status: SHIPPED | OUTPATIENT
Start: 2021-04-09 | End: 2021-09-22

## 2021-04-13 ENCOUNTER — TELEPHONE (OUTPATIENT)
Dept: FAMILY MEDICINE CLINIC | Facility: CLINIC | Age: 61
End: 2021-04-13

## 2021-04-13 DIAGNOSIS — J45.20 MILD INTERMITTENT ASTHMA WITHOUT COMPLICATION: Primary | ICD-10-CM

## 2021-04-13 RX ORDER — LEVALBUTEROL TARTRATE 45 UG/1
1-2 AEROSOL, METERED ORAL EVERY 4 HOURS PRN
Qty: 1 INHALER | Refills: 3 | Status: SHIPPED | OUTPATIENT
Start: 2021-04-13 | End: 2021-04-15 | Stop reason: ALTCHOICE

## 2021-04-13 NOTE — TELEPHONE ENCOUNTER
MF, Pts  called stating that pt   Is already out of her Inhaler the Albuterol due to the [address] numbers are different according to  states that the [address] # [place] [id number]  Pt has to use to much in order for he inhaler to work and the NPI # [id number] is the inhaler that works better, I spoke to pharmacy and they states that pt not able to get a refill due to pt is 300 days ahead so pharmacist suggested for pt to make an appt, What do you suggest?

## 2021-04-15 ENCOUNTER — OFFICE VISIT (OUTPATIENT)
Dept: FAMILY MEDICINE CLINIC | Facility: CLINIC | Age: 61
End: 2021-04-15
Payer: COMMERCIAL

## 2021-04-15 VITALS — HEART RATE: 85 BPM | OXYGEN SATURATION: 91 %

## 2021-04-15 DIAGNOSIS — J45.20 MILD INTERMITTENT ASTHMA WITHOUT COMPLICATION: Primary | ICD-10-CM

## 2021-04-15 PROCEDURE — 99214 OFFICE O/P EST MOD 30 MIN: CPT | Performed by: NURSE PRACTITIONER

## 2021-04-15 RX ORDER — ALBUTEROL SULFATE 90 UG/1
2 AEROSOL, METERED RESPIRATORY (INHALATION) EVERY 4 HOURS PRN
Qty: 3 INHALER | Refills: 5 | Status: SHIPPED | OUTPATIENT
Start: 2021-04-15 | End: 2021-04-20

## 2021-04-15 NOTE — PROGRESS NOTES
Assessment and Plan:    Problem List Items Addressed This Visit        Respiratory    Mild intermittent asthma without complication - Primary     Patient not on controller inhaler  She is currently taking montelukast  Using ventolin as needed  Patient may benefit from additional combination inhaler  Will send wixella to patient pharmacy  Rescue inhalers patient brought to the office are indeed different from each other although they have same active ingredient  Patient newest rescue inhaler is not traditional ventolin packaging and a different   She finds better relief with ventolin  Will send new rx to alternative pharmacy of choice  Relevant Medications    albuterol (Ventolin HFA) 90 mcg/act inhaler    fluticasone-salmeterol (ADVAIR, WIXELA) 100-50 mcg/dose inhaler                 Diagnoses and all orders for this visit:    Mild intermittent asthma without complication  -     albuterol (Ventolin HFA) 90 mcg/act inhaler; Inhale 2 puffs every 4 (four) hours as needed for wheezing or shortness of breath  -     fluticasone-salmeterol (ADVAIR, WIXELA) 100-50 mcg/dose inhaler; Inhale 1 puff 2 (two) times a day Rinse mouth after use  Subjective:      Patient ID: Shital Oliver is a 59 y o  female  CC:    No chief complaint on file  HPI:    Hybrid visit  Patient reports she has been having increase asthma symptoms about 2 weeks now with her allergies  She seldom uses her rescue inhaler but did go to refill and [facility] gave her a different inhaler than she was using before as their brand generic  She finds this is less effective  Patient is on singulair at bedtime  Her pcp send alternative xopenex into pharmacy however this now needs prior auth  Patient presents today with both new prescription and previous prescription for rescue inhaler         The following portions of the patient's history were reviewed and updated as appropriate: allergies, current medications, past family history, past medical history, past social history, past surgical history and problem list       Review of Systems   Constitutional: Negative for appetite change, chills, diaphoresis, fatigue and fever  HENT: Positive for rhinorrhea  Negative for congestion, postnasal drip, sinus pressure, sinus pain, sore throat and trouble swallowing  Respiratory: Positive for chest tightness and wheezing  Negative for cough and shortness of breath  Gastrointestinal: Negative for abdominal pain, diarrhea, nausea and vomiting  Allergic/Immunologic: Positive for environmental allergies  Neurological: Negative for dizziness, light-headedness and headaches  Data to review:       Objective:    Vitals:    04/15/21 1417   Pulse: 85   SpO2: 91%        Physical Exam  Vitals signs and nursing note reviewed  Constitutional:       General: She is not in acute distress  Appearance: Normal appearance  She is obese  She is not ill-appearing, toxic-appearing or diaphoretic  HENT:      Head: Normocephalic and atraumatic  Right Ear: External ear normal       Left Ear: External ear normal    Eyes:      Extraocular Movements: Extraocular movements intact  Conjunctiva/sclera: Conjunctivae normal    Cardiovascular:      Rate and Rhythm: Normal rate and regular rhythm  Heart sounds: Normal heart sounds, S1 normal and S2 normal  No murmur  Pulmonary:      Effort: Pulmonary effort is normal  No tachypnea, accessory muscle usage or respiratory distress  Breath sounds: Normal breath sounds and air entry  No wheezing  Skin:     Coloration: Skin is not pale  Neurological:      Mental Status: She is alert and oriented to person, place, and time  Psychiatric:         Mood and Affect: Mood normal          Behavior: Behavior normal          Thought Content:  Thought content normal          Judgment: Judgment normal

## 2021-04-15 NOTE — ASSESSMENT & PLAN NOTE
Patient not on controller inhaler  She is currently taking montelukast  Using ventolin as needed  Patient may benefit from additional combination inhaler  Will send wixella to patient pharmacy  Rescue inhalers patient brought to the office are indeed different from each other although they have same active ingredient  Patient newest rescue inhaler is not traditional ventolin packaging and a different   She finds better relief with ventolin  Will send new rx to alternative pharmacy of choice

## 2021-04-19 DIAGNOSIS — J45.20 MILD INTERMITTENT ASTHMA WITHOUT COMPLICATION: ICD-10-CM

## 2021-04-20 RX ORDER — ALBUTEROL SULFATE 90 UG/1
AEROSOL, METERED RESPIRATORY (INHALATION)
Qty: 42.5 G | Refills: 3 | Status: SHIPPED | OUTPATIENT
Start: 2021-04-20 | End: 2021-09-21

## 2021-05-04 DIAGNOSIS — F98.8 ATTENTION DEFICIT DISORDER (ADD) WITHOUT HYPERACTIVITY: ICD-10-CM

## 2021-05-04 DIAGNOSIS — F41.9 ANXIETY: ICD-10-CM

## 2021-05-04 RX ORDER — DEXTROAMPHETAMINE SACCHARATE, AMPHETAMINE ASPARTATE, DEXTROAMPHETAMINE SULFATE AND AMPHETAMINE SULFATE 7.5; 7.5; 7.5; 7.5 MG/1; MG/1; MG/1; MG/1
30 TABLET ORAL 2 TIMES DAILY
Qty: 60 TABLET | Refills: 0 | Status: SHIPPED | OUTPATIENT
Start: 2021-05-04 | End: 2021-06-01 | Stop reason: SDUPTHER

## 2021-05-04 RX ORDER — LORAZEPAM 2 MG/1
2 TABLET ORAL EVERY 6 HOURS PRN
Qty: 360 TABLET | Refills: 0 | Status: SHIPPED | OUTPATIENT
Start: 2021-05-04 | End: 2021-07-23 | Stop reason: SDUPTHER

## 2021-05-07 ENCOUNTER — TELEPHONE (OUTPATIENT)
Dept: FAMILY MEDICINE CLINIC | Facility: CLINIC | Age: 61
End: 2021-05-07

## 2021-05-07 DIAGNOSIS — K44.9 HIATAL HERNIA: Primary | ICD-10-CM

## 2021-05-07 NOTE — TELEPHONE ENCOUNTER
PATIENT'S  STATES DR Sarah Dunham WAS THE ORIGINAL SURGEON, BUT SHE IS NOT HAPPY WITH HIM AND WOULD LIKE ADVICE FOR ANOTHER SURGEON IN THE NETWORK THAT CAN HELP HER  PLEASE CALL PATIENT   PLEASE RESPOND TO CLINICAL STAFF

## 2021-05-07 NOTE — TELEPHONE ENCOUNTER
Pt's  called in  Dr Bette Sylvester recommended a surgeon for Kathrin Meza for her hernia  When the pt contacted this surgeon, they were told she needs to go back to the surgeon who did her original weight loss surgery  However, this original surgeon was not very nice and the pt does not want to go back to him  She wants to know what Dr Bette Sylvester recommends now? Is there any other surgeon that Dr Bette Sylvester could contact and provide information to so that they would see Abingdongisell Meza? When she moves a certain way, it is painful and she has cramps  Please advise  Thank you!

## 2021-06-01 DIAGNOSIS — F98.8 ATTENTION DEFICIT DISORDER (ADD) WITHOUT HYPERACTIVITY: ICD-10-CM

## 2021-06-01 RX ORDER — DEXTROAMPHETAMINE SACCHARATE, AMPHETAMINE ASPARTATE, DEXTROAMPHETAMINE SULFATE AND AMPHETAMINE SULFATE 7.5; 7.5; 7.5; 7.5 MG/1; MG/1; MG/1; MG/1
30 TABLET ORAL 2 TIMES DAILY
Qty: 60 TABLET | Refills: 0 | Status: SHIPPED | OUTPATIENT
Start: 2021-06-01 | End: 2021-06-29 | Stop reason: SDUPTHER

## 2021-06-02 ENCOUNTER — CONSULT (OUTPATIENT)
Dept: SURGERY | Facility: CLINIC | Age: 61
End: 2021-06-02
Payer: COMMERCIAL

## 2021-06-02 VITALS
HEIGHT: 63 IN | BODY MASS INDEX: 51.21 KG/M2 | SYSTOLIC BLOOD PRESSURE: 148 MMHG | DIASTOLIC BLOOD PRESSURE: 96 MMHG | WEIGHT: 289 LBS | TEMPERATURE: 97.9 F | HEART RATE: 104 BPM

## 2021-06-02 DIAGNOSIS — K44.9 HIATAL HERNIA: Primary | ICD-10-CM

## 2021-06-02 DIAGNOSIS — R13.10 DYSPHAGIA, UNSPECIFIED TYPE: ICD-10-CM

## 2021-06-02 PROCEDURE — 99204 OFFICE O/P NEW MOD 45 MIN: CPT | Performed by: SURGERY

## 2021-06-02 NOTE — PROGRESS NOTES
Assessment/Plan:    She reports some dysphagia which she relates as difficulty swallowing as well as a feeling of food getting stuck  Will check a barium swallow study to evaluate her swallowing and motility as well as her esophagus  She does have a hiatal hernia on CT scan  This in conjunction with her weight gain after her surgery leading me to believe that a conversion of her sleeve to a bypass would be a good fit for her as this could repair her diaphragmatic hernia as well as decrease or eliminate her reflux disease  She does not have a good relationship with her previous bariatric surgeon so will discuss referral options for other bariatric surgeons in the area  No problem-specific Assessment & Plan notes found for this encounter  Diagnoses and all orders for this visit:    Hiatal hernia  -     Ambulatory referral to General Surgery    Dysphagia, unspecified type  -     FL Barium Swallow Motility Study; Future          Subjective:      Patient ID: Amy Posada is a 59 y o  female  She presents with complaints of dysphagia and epigastric abdominal pain  She underwent EGD in October which showed gastritis as well as a Schatzki's ring  She has undergone dilations in the past which she says have failed  Her dysphagia it she describes difficulty swallowing and she feels like the food will get stuck in her mid esophagus  She did undergo a CT scan of the chest which showed that her gastric sleeve was partially herniated up through her diaphragm  She has been taking a PPI without much relief  She also reports weight gain after her gastric sleeve procedure done about 5 years ago  She does not have a good relationship with that surgeon  She says the epigastric pain is worse when twisting and bending  She does have some generalized abdominal pain as well  A chart review was performed and previous primary care visit notes were reviewed    All applicable imaging studies were reviewed and images were reviewed personally  All applicable laboratory studies were reviewed personally  Care everywhere review was performed if  available and all pertinent notes were reviewed  The following portions of the patient's history were reviewed and updated as appropriate:   She  has a past medical history of ADD (attention deficit disorder), Anxiety, Arthritis, Asthma, Bipolar 1 disorder ([facility] ), Cellulitis, Cellulitis of leg, Depression, Difficulty swallowing, Disease of thyroid gland, Dysphagia, Elevated lipids, Hearing loss, Hiatal hernia, Hyperlipidemia, Hypertension, Psychiatric disorder, Schatzki's ring, Sleep apnea, and Wears eyeglasses  She   Patient Active Problem List    Diagnosis Date Noted    Dizziness 04/06/2018    Mild intermittent asthma without complication 59/33/0806    Attention deficit disorder (ADD) 05/04/2015    Obstructive sleep apnea 11/20/2014    Postgastrectomy malabsorption 11/20/2014    Hiatal hernia 09/18/2014    Hyperlipidemia 08/20/2014    Depression 06/02/2014    Hypothyroidism 06/02/2014    Morbid obesity ([place] [address] ) 06/02/2014    Anxiety 10/11/2012    Benign essential hypertension 10/11/2012    Bipolar I disorder, single manic episode ([place] [address] ) 10/11/2012     She  has a past surgical history that includes Gastric bypass; Replacement total knee; Joint replacement; Cholecystectomy; Sleeve Gastroplasty; Amputation; Rotator cuff repair (Left); Bunionectomy; Forearm surgery (Left); Bariatric Surgery; Esophageal dilation; pr colonoscopy flx dx w/collj spec when pfrmd (N/A, 6/17/2016); pr esophagogastroduodenoscopy transoral diagnostic (N/A, 4/11/2017); Skin lesion excision; Knee surgery (Left); Tonsillectomy; and Hysterectomy (1998)  Her family history includes Alcohol abuse in her brother and father;  Anxiety disorder in her brother; Brain cancer in her maternal uncle; Breast cancer (age of onset: 62) in her mother; Breast cancer (age of onset: 71) in her paternal aunt; Hepatitis in her brother; Liver cancer (age of onset: 72) in her maternal grandmother; Lung cancer in her maternal aunt, maternal grandfather, and mother; Mental illness in her brother and family; No Known Problems in her paternal grandfather, paternal grandmother, and paternal uncle; Prostate cancer (age of onset: 79) in her father; Substance Abuse in her brother and father  She  reports that she quit smoking about 10 years ago  She has never used smokeless tobacco  She reports current alcohol use  She reports that she does not use drugs  Current Outpatient Medications   Medication Sig Dispense Refill    albuterol (PROVENTIL HFA,VENTOLIN HFA) 90 mcg/act inhaler INHALE 2 PUFFS BY MOUTH EVERY 4 HOURS AS NEEDED FOR WHEEZING OR SHORTNESS OF BREATH 42 5 g 3    amLODIPine (NORVASC) 10 mg tablet TAKE 1 TABLET BY MOUTH  DAILY 90 tablet 1    amphetamine-dextroamphetamine (ADDERALL) 30 MG tablet Take 1 tablet (30 mg total) by mouth 2 (two) times a dayMax Daily Amount: 60 mg 60 tablet 0    calcium citrate-Vitamin D (Calcium Citrate + D3) 200 mg-250 units Take by mouth      clonazePAM (KlonoPIN) 1 mg tablet 1 1/2 tabs at night 135 tablet 1    FLUoxetine (PROzac) 20 mg capsule Take 2 capsules (40 mg total) by mouth daily 180 capsule 1    fluticasone-salmeterol (ADVAIR, WIXELA) 100-50 mcg/dose inhaler Inhale 1 puff 2 (two) times a day Rinse mouth after use   180 each 3    ipratropium-albuterol (DUO-NEB) 0 5-2 5 mg/3 mL nebulizer solution Take 1 vial (3 mL total) by nebulization every 6 (six) hours as needed for wheezing or shortness of breath 60 vial 2    levothyroxine 100 mcg tablet TAKE 1 TABLET BY MOUTH  DAILY 90 tablet 1    LORazepam (ATIVAN) 2 mg tablet Take 1 tablet (2 mg total) by mouth every 6 (six) hours as needed for anxiety 360 tablet 0    montelukast (SINGULAIR) 10 mg tablet TAKE 1 TABLET BY MOUTH  DAILY 90 tablet 1    pantoprazole (PROTONIX) 40 mg tablet Take 1 tablet (40 mg total) by mouth daily 90 tablet 3    Pediatric Multivitamins-Fl (MULTIVITAMINS/FL PO) Take by mouth      QUEtiapine (SEROquel) 100 mg tablet Take 1 tablet (100 mg total) by mouth daily at bedtime 90 tablet 1    clotrimazole-betamethasone (LOTRISONE) 1-0 05 % cream Apply topically 2 (two) times a day (Patient not taking: Reported on 6/2/2021) 30 g 0    meclizine (ANTIVERT) 12 5 MG tablet Take 1 tablet (12 5 mg total) by mouth every 8 (eight) hours as needed for dizziness (Patient not taking: Reported on 11/9/2020) 30 tablet 0     No current facility-administered medications for this visit  She is allergic to oxycontin [oxycodone]; vilazodone hcl; augmentin [amoxicillin-pot clavulanate]; clarithromycin; erythromycin; and other       Review of Systems   HENT: Positive for trouble swallowing  Gastrointestinal: Positive for abdominal pain  All other systems reviewed and are negative  Objective:      /96 (BP Location: Left arm, Patient Position: Sitting, Cuff Size: Large)   Pulse 104   Temp 97 9 °F (36 6 °C) (Tympanic)   Ht 5' 3" (1 6 m)   Wt 131 kg (289 lb)   BMI 51 19 kg/m²          Physical Exam  Vitals signs reviewed  Constitutional:       General: She is not in acute distress  Appearance: Normal appearance  She is obese  HENT:      Head: Normocephalic and atraumatic  Eyes:      Extraocular Movements: Extraocular movements intact  Conjunctiva/sclera: Conjunctivae normal       Pupils: Pupils are equal, round, and reactive to light  Neck:      Musculoskeletal: Normal range of motion  Cardiovascular:      Rate and Rhythm: Normal rate and regular rhythm  Pulmonary:      Effort: Pulmonary effort is normal       Breath sounds: Normal breath sounds  Abdominal:      General: Abdomen is flat  There is no distension  Palpations: Abdomen is soft  Tenderness: There is generalized abdominal tenderness  There is no guarding or rebound  Hernia: No hernia is present         Musculoskeletal: Normal range of motion  General: No swelling or tenderness  Skin:     General: Skin is warm and dry  Neurological:      General: No focal deficit present  Mental Status: She is alert and oriented to person, place, and time  Psychiatric:         Mood and Affect: Mood normal          Behavior: Behavior normal          Thought Content:  Thought content normal          Judgment: Judgment normal

## 2021-06-09 DIAGNOSIS — F41.9 ANXIETY: ICD-10-CM

## 2021-06-10 ENCOUNTER — HOSPITAL ENCOUNTER (OUTPATIENT)
Dept: RADIOLOGY | Facility: HOSPITAL | Age: 61
Discharge: HOME | End: 2021-06-10
Attending: SURGERY
Payer: COMMERCIAL

## 2021-06-10 DIAGNOSIS — R13.10 DYSPHAGIA, UNSPECIFIED TYPE: ICD-10-CM

## 2021-06-10 PROCEDURE — 74220 X-RAY XM ESOPHAGUS 1CNTRST: CPT

## 2021-06-10 RX ORDER — CLONAZEPAM 1 MG/1
TABLET ORAL
Qty: 135 TABLET | OUTPATIENT
Start: 2021-06-10

## 2021-06-11 DIAGNOSIS — K44.9 HIATAL HERNIA: Primary | ICD-10-CM

## 2021-06-15 DIAGNOSIS — F41.9 ANXIETY: ICD-10-CM

## 2021-06-16 RX ORDER — CLONAZEPAM 1 MG/1
TABLET ORAL
Qty: 135 TABLET | Refills: 1 | Status: SHIPPED | OUTPATIENT
Start: 2021-06-16 | End: 2021-08-04 | Stop reason: CLARIF

## 2021-06-25 ENCOUNTER — CONSULT (OUTPATIENT)
Dept: SURGERY | Facility: CLINIC | Age: 61
End: 2021-06-25
Payer: COMMERCIAL

## 2021-06-25 VITALS
HEART RATE: 93 BPM | SYSTOLIC BLOOD PRESSURE: 152 MMHG | WEIGHT: 282.6 LBS | BODY MASS INDEX: 50.07 KG/M2 | HEIGHT: 63 IN | DIASTOLIC BLOOD PRESSURE: 88 MMHG | TEMPERATURE: 97.5 F

## 2021-06-25 DIAGNOSIS — K44.9 HIATAL HERNIA: Primary | ICD-10-CM

## 2021-06-25 PROCEDURE — 99214 OFFICE O/P EST MOD 30 MIN: CPT | Performed by: SURGERY

## 2021-06-25 NOTE — ASSESSMENT & PLAN NOTE
52-year-old female with a complex medical and surgical history notable for a sleeve gastrectomy, now with a hiatal hernia, as well as a Schatzki's ring  Plan:   Ms Lindsey Paz constellation of symptoms are consistent with having a 2 part problem  She has gastroesophageal reflux disease with regurgitation and lower chest and upper abdominal pain likely secondary to a hiatal hernia following sleeve gastrectomy  Additionally she has some dysphagia and regurgitation of undigested food as well as pills from time to time  This is likely secondary to a peptic stricture, or Schatzki's ring in her distal esophagus  Furthermore, she has had significant weight regain after her sleeve gastrectomy approximately 5 years ago and is now about at her preoperative weight  We talked at length that a potential solution for her constellation of symptoms is likely 2 part in nature  We need to deal with her hiatal hernia, as well as gastroesophageal reflux disease in order to prevent persistent acid exposure in her esophagus likely leading to her stricture  That stricture may need to be dilated again preoperatively in order for her dysphagia to be ameliorated enough for her to undergo a subsequent operation  We talked through surgical options, and I feel very strongly that the best possible option for her hiatal hernia as well as GERD after sleeve gastrectomy is for a conversion into a Attila-en-Y gastric bypass with hiatal hernia repair  Unfortunately, she feels strongly that she would like to see a different bariatric surgery group for her problems, as she has had a negative experience with this group in the past and would like to see someone new  In my opinion, she would likely benefit from pH and manometry testing preoperatively, as well as a repeat EGD  I will defer this to her outside bariatric surgeon  She is to follow-up with an outside bariatric surgery group, but may call to see me if any questions arise
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
## 2021-06-28 DIAGNOSIS — F98.8 ATTENTION DEFICIT DISORDER (ADD) WITHOUT HYPERACTIVITY: ICD-10-CM

## 2021-06-28 RX ORDER — DEXTROAMPHETAMINE SACCHARATE, AMPHETAMINE ASPARTATE, DEXTROAMPHETAMINE SULFATE AND AMPHETAMINE SULFATE 7.5; 7.5; 7.5; 7.5 MG/1; MG/1; MG/1; MG/1
30 TABLET ORAL 2 TIMES DAILY
Qty: 60 TABLET | Refills: 0 | Status: CANCELLED | OUTPATIENT
Start: 2021-06-28 | End: 2021-07-28

## 2021-06-29 ENCOUNTER — TELEPHONE (OUTPATIENT)
Dept: FAMILY MEDICINE CLINIC | Facility: CLINIC | Age: 61
End: 2021-06-29

## 2021-06-29 DIAGNOSIS — F98.8 ATTENTION DEFICIT DISORDER (ADD) WITHOUT HYPERACTIVITY: ICD-10-CM

## 2021-06-29 NOTE — TELEPHONE ENCOUNTER
PT ASKING IF dR MENDEZ CAN SIGN OFF ON HER ADDERALL  TODAY IS HER LAST PILL  SHE WANTS IT CALLED INTO [facility]    PT CAN BE REACHED [phone]

## 2021-07-01 RX ORDER — DEXTROAMPHETAMINE SACCHARATE, AMPHETAMINE ASPARTATE, DEXTROAMPHETAMINE SULFATE AND AMPHETAMINE SULFATE 7.5; 7.5; 7.5; 7.5 MG/1; MG/1; MG/1; MG/1
30 TABLET ORAL 2 TIMES DAILY
Qty: 60 TABLET | Refills: 0 | Status: SHIPPED | OUTPATIENT
Start: 2021-07-01 | End: 2021-07-28 | Stop reason: SDUPTHER

## 2021-07-01 NOTE — TELEPHONE ENCOUNTER
Dr Joleen Sahu the rx refill for pt's Adderall did not go through  Please sign and send, pt spouse is following up on it

## 2021-07-14 ENCOUNTER — TELEMEDICINE (OUTPATIENT)
Dept: FAMILY MEDICINE CLINIC | Facility: CLINIC | Age: 61
End: 2021-07-14
Payer: COMMERCIAL

## 2021-07-14 VITALS — WEIGHT: 282 LBS | BODY MASS INDEX: 49.95 KG/M2

## 2021-07-14 DIAGNOSIS — J06.9 UPPER RESPIRATORY TRACT INFECTION, UNSPECIFIED TYPE: Primary | ICD-10-CM

## 2021-07-14 DIAGNOSIS — J45.20 MILD INTERMITTENT ASTHMA WITHOUT COMPLICATION: ICD-10-CM

## 2021-07-14 PROCEDURE — 99441 PR PHYS/QHP TELEPHONE EVALUATION 5-10 MIN: CPT | Performed by: FAMILY MEDICINE

## 2021-07-14 RX ORDER — FLUTICASONE PROPIONATE 50 MCG
1 SPRAY, SUSPENSION (ML) NASAL DAILY
Qty: 17 G | Refills: 3 | Status: SHIPPED | OUTPATIENT
Start: 2021-07-14 | End: 2022-06-21

## 2021-07-14 RX ORDER — OMEPRAZOLE 20 MG/1
CAPSULE, DELAYED RELEASE ORAL
COMMUNITY
End: 2022-06-01 | Stop reason: SDUPTHER

## 2021-07-14 RX ORDER — DOXYCYCLINE HYCLATE 100 MG
100 TABLET ORAL 2 TIMES DAILY
Qty: 20 TABLET | Refills: 0 | Status: SHIPPED | OUTPATIENT
Start: 2021-07-14 | End: 2021-07-24

## 2021-07-14 NOTE — PROGRESS NOTES
Virtual Brief Visit    Verification of patient location:    Patient is currently located in the state of [place]  Patient is currently located in a state in which I am licensed    Assessment/Plan:    Problem List Items Addressed This Visit        Respiratory    Mild intermittent asthma without complication     No wheezing with this infection so will hold off on prednisone           Other Visit Diagnoses     Upper respiratory tract infection, unspecified type    -  Primary    Relevant Medications    fluticasone (FLONASE) 50 mcg/act nasal spray    doxycycline hyclate (VIBRA-TABS) 100 mg tablet                Reason for visit is   Chief Complaint   Patient presents with    Nasal Congestion     wheezing, cough, loss of taste and smell x 2 days    Virtual Brief Visit        Encounter provider Dimitris Jolley MD    Provider located at [address]  [address] [address] [address] [phone]    Recent Visits  No visits were found meeting these conditions  Showing recent visits within past 7 days and meeting all other requirements  Today's Visits  Date Type Provider Dept   07/14/21 Telemedicine Dimitris Jolley MD [facility]   Showing today's visits and meeting all other requirements  Future Appointments  No visits were found meeting these conditions  Showing future appointments within next 150 days and meeting all other requirements       After connecting through telephone, the patient was identified by name and date of birth  Sophia Blancas was informed that this is a telemedicine visit and that the visit is being conducted through telephone  My office door was closed  No one else was in the room  She acknowledged consent and understanding of privacy and security of the platform  The patient has agreed to participate and understands she can discontinue the visit at any time  Patient is aware this is a billable service  Subjective    Sophia Blancas is a 59 y o  female *Fátima Sr sx, grandson had  Sinus infection, low grade temp 99, whitish mucus with cough no change smell/taste, no increased wheezing, no gi sx, no ear or throat pain , no headache, positive congestion, slight runny nose, clear mucus, concerned because colds/sinus infections can flare up asthma       Past Medical History:   Diagnosis Date    ADD (attention deficit disorder)     Anxiety     Arthritis     Asthma     Bipolar 1 disorder ([address] )     Cellulitis     Cellulitis of leg     Depression     Difficulty swallowing     Disease of thyroid gland     hypothyroid    Dysphagia     Elevated lipids     Hearing loss     Hiatal hernia     diaphragmatic    Hyperlipidemia     Hypertension     Psychiatric disorder     Bipolar    Schatzki's ring     Sleep apnea     Wears eyeglasses        Past Surgical History:   Procedure Laterality Date    AMPUTATION      Right little toe    BARIATRIC SURGERY      gastric sleeve    BUNIONECTOMY      CHOLECYSTECTOMY      ESOPHAGEAL DILATION      FOREARM SURGERY Left     FRACTURE REPAIR WITH METAL AND METAL REPLACED    GASTRIC BYPASS      HYSTERECTOMY  1998    age 34    JOINT REPLACEMENT      knee    KNEE SURGERY Left     [place] COLONOSCOPY FLX DX W/COLLJ SPEC WHEN PFRMD N/A 6/17/2016    Procedure: EGD AND COLONOSCOPY;  Surgeon: Luciana June MD;  [facility]; Service: Gastroenterology    [place] ESOPHAGOGASTRODUODENOSCOPY TRANSORAL DIAGNOSTIC N/A 4/11/2017    Procedure: ESOPHAGOGASTRODUODENOSCOPY WITH BALLOON DILATATION;  Surgeon: Prisca Owens MD;  [facility];   Service: Gastroenterology    REPLACEMENT TOTAL KNEE      Left Side    ROTATOR CUFF REPAIR Left     SKIN LESION EXCISION      thighs    SLEEVE GASTROPLASTY      gastric sleeve    TONSILLECTOMY         Current Outpatient Medications   Medication Sig Dispense Refill    albuterol (PROVENTIL HFA,VENTOLIN HFA) 90 mcg/act inhaler INHALE 2 PUFFS BY MOUTH EVERY 4 HOURS AS NEEDED FOR WHEEZING OR SHORTNESS OF BREATH 42 5 g 3    amLODIPine (NORVASC) 10 mg tablet TAKE 1 TABLET BY MOUTH  DAILY 90 tablet 1    amphetamine-dextroamphetamine (ADDERALL) 30 MG tablet Take 1 tablet (30 mg total) by mouth 2 (two) times a dayMax Daily Amount: 60 mg 60 tablet 0    calcium citrate-Vitamin D (Calcium Citrate + D3) 200 mg-250 units Take by mouth      clonazePAM (KlonoPIN) 1 mg tablet 1 1/2 tabs at night 135 tablet 1    FLUoxetine (PROzac) 20 mg capsule Take 2 capsules (40 mg total) by mouth daily 180 capsule 1    fluticasone-salmeterol (ADVAIR, WIXELA) 100-50 mcg/dose inhaler Inhale 1 puff 2 (two) times a day Rinse mouth after use  180 each 3    ipratropium-albuterol (DUO-NEB) 0 5-2 5 mg/3 mL nebulizer solution Take 1 vial (3 mL total) by nebulization every 6 (six) hours as needed for wheezing or shortness of breath 60 vial 2    levothyroxine 100 mcg tablet TAKE 1 TABLET BY MOUTH  DAILY 90 tablet 1    LORazepam (ATIVAN) 2 mg tablet Take 1 tablet (2 mg total) by mouth every 6 (six) hours as needed for anxiety 360 tablet 0    montelukast (SINGULAIR) 10 mg tablet TAKE 1 TABLET BY MOUTH  DAILY 90 tablet 1    pantoprazole (PROTONIX) 40 mg tablet Take 1 tablet (40 mg total) by mouth daily 90 tablet 3    Pediatric Multivitamins-Fl (MULTIVITAMINS/FL PO) Take by mouth      QUEtiapine (SEROquel) 100 mg tablet Take 1 tablet (100 mg total) by mouth daily at bedtime 90 tablet 1    clotrimazole-betamethasone (LOTRISONE) 1-0 05 % cream Apply topically 2 (two) times a day (Patient not taking: Reported on 6/2/2021) 30 g 0    doxycycline hyclate (VIBRA-TABS) 100 mg tablet Take 1 tablet (100 mg total) by mouth 2 (two) times a day for 10 days 20 tablet 0    fluticasone (FLONASE) 50 mcg/act nasal spray 1 spray into each nostril daily 17 g 3    omeprazole (PriLOSEC) 20 mg delayed release capsule omeprazole 20 mg capsule,delayed release       No current facility-administered medications for this visit          Allergies   Allergen Reactions    Oxycontin [Oxycodone]      Some type of reaction 20 years ago where she had trouble breathing   Vilazodone Hcl     Augmentin [Amoxicillin-Pot Clavulanate] GI Intolerance     Doesn't know    Clarithromycin GI Intolerance    Erythromycin GI Intolerance    Other GI Intolerance     vibryd       Review of Systems   Constitutional: Positive for fatigue and fever  Negative for chills  HENT: Positive for congestion and rhinorrhea  Negative for ear pain, facial swelling, sinus pressure, sinus pain and voice change  Respiratory: Positive for cough  Negative for shortness of breath and wheezing  Gastrointestinal: Negative  Neurological: Negative for dizziness and headaches  Vitals:    07/14/21 0843   Weight: 128 kg (282 lb)         I spent 10 minutes directly with the patient during this visit    VIRTUAL VISIT [facility] [facility] verbally agrees to participate in [employer]  Pt is aware that [employer] could be limited without vital signs or the ability to perform a full hands-on physical exam  Danita LORI Patrick understands she or the provider may request at any time to terminate the video visit and request the patient to seek care or treatment in person

## 2021-07-16 ENCOUNTER — RA CDI HCC (OUTPATIENT)
Dept: OTHER | Facility: HOSPITAL | Age: 61
End: 2021-07-16

## 2021-07-17 ENCOUNTER — NURSE TRIAGE (OUTPATIENT)
Dept: OTHER | Facility: OTHER | Age: 61
End: 2021-07-17

## 2021-07-17 DIAGNOSIS — F41.9 ANXIETY: ICD-10-CM

## 2021-07-17 NOTE — PROGRESS NOTES
[place] [id number]  coding opportunities             Chart reviewed, (number of) suggestions sent to provider: 1     Problem listed updated   Provider Accepted, (number of) suggestions accepted: 1               Patients insurance company: [employer]     Visit status: Patient canceled the appointment        [place] [address]  coding opportunities        7/30     Chart reviewed, (number of) suggestions sent to provider: 1    [id number]  Amputation status of right little toe                   Patients insurance company: [employer]

## 2021-07-17 NOTE — TELEPHONE ENCOUNTER
Answer Assessment - Initial Assessment Questions  1  RESPIRATORY STATUS: "Describe your breathing?" (e g , wheezing, shortness of breath, unable to speak, severe coughing)       Chest tightness, and wheezing, sob   2  ONSET: "When did this asthma attack begin?"       Tightness in her chest   3  TRIGGER: "What do you think triggered this attack?" (e g , URI, exposure to pollen or other allergen, tobacco smoke)       Asthma   4  PEAK EXPIRATORY FLOW RATE (PEFR): "Do you use a peak flow meter?" If Yes, ask: "What's the current peak flow? What's your personal best peak flow?"         5  SEVERITY: "How bad is this attack?"     - MILD: No SOB at rest, mild SOB with walking, speaks normally in sentences, can lay down, no retractions, pulse < 100  (GREEN Zone: PEFR %)    - MODERATE: SOB at rest, SOB with minimal exertion and prefers to sit, cannot lie down flat, speaks in phrases, mild retractions, audible wheezing, pulse 100-120  (YELLOW Zone: PEFR 50-80%)     - SEVERE: Very SOB at rest, speaks in single words, struggling to breathe, sitting hunched forward, retractions, usually loud wheezing, sometimes minimal wheezing because of decreased air movement, pulse > 120  (RED Zone: PEFR < 50%)  moderate  6  MEDICATIONS (Inhaler or nebs): "What are your asthma medications?" and "What treatments have you given so far?"     - Quick-relief: albuterol, metaproterenol, salbutamol, or other inhaled or nebulized beta-agonist medicines    - Long-term-control: steroids, cromolyn, or other anti-inflammatory medicines  On an antibiotic, inhaler and nebulizer   7  OTHER SYMPTOMS: "Do you have any other symptoms? (e g , runny nose, chest pain, fever)      No   8   PREGNANCY: "Is there any chance you are pregnant?" "When was your last menstrual,  period?"      no    Protocols used: ASTHMA ATTACK-ADULT-AH  pt  called in to request prednisone for pt who is having increased chest tightness and mild sob, on call provider will take care of it

## 2021-07-17 NOTE — TELEPHONE ENCOUNTER
Reason for Disposition   [1] MILD asthma attack (e g , no SOB at rest, mild SOB with walking, speaks normally in sentences, mild wheezing) AND [2]  persists > 24 hours on appropriate treatment    Protocols used: ASTHMA ATTACK-ADULT-AH

## 2021-07-22 RX ORDER — LORAZEPAM 2 MG/1
2 TABLET ORAL EVERY 6 HOURS PRN
Qty: 360 TABLET | Refills: 0 | Status: CANCELLED | OUTPATIENT
Start: 2021-07-22

## 2021-07-22 NOTE — TELEPHONE ENCOUNTER
Called pt she is doing well, she did not go to urgent care or ed   pt would like you know that surgeon recommended for best treatment pt is to go to [facility] she is to go for consultation 8/10/21 with Dr Ramonita Spatz  For her issues with swallowing, hernia and bariatrics  Rescheduled her 4 month f/u to august    Please send lorazepam to [facility] pt only has two left  Please call pt when this is sent over

## 2021-07-23 DIAGNOSIS — F41.9 ANXIETY: ICD-10-CM

## 2021-07-23 RX ORDER — LORAZEPAM 2 MG/1
2 TABLET ORAL EVERY 6 HOURS PRN
Qty: 360 TABLET | Refills: 0 | Status: SHIPPED | OUTPATIENT
Start: 2021-07-23 | End: 2021-08-04 | Stop reason: SDUPTHER

## 2021-07-23 NOTE — TELEPHONE ENCOUNTER
This is  a controlled substance-pt is to take it as prescribed, if anxiety  that bad needs ov to discuss different treatment plan, please do NOT do this again, must take as prescribed please

## 2021-07-23 NOTE — TELEPHONE ENCOUNTER
Edvin Barrow called back stating pt is taking an extra pill due to her anxiety because she is going to [address] [place] [address] for hernia

## 2021-07-28 DIAGNOSIS — F98.8 ATTENTION DEFICIT DISORDER (ADD) WITHOUT HYPERACTIVITY: ICD-10-CM

## 2021-07-28 RX ORDER — DEXTROAMPHETAMINE SACCHARATE, AMPHETAMINE ASPARTATE, DEXTROAMPHETAMINE SULFATE AND AMPHETAMINE SULFATE 7.5; 7.5; 7.5; 7.5 MG/1; MG/1; MG/1; MG/1
30 TABLET ORAL 2 TIMES DAILY
Qty: 60 TABLET | Refills: 0 | Status: SHIPPED | OUTPATIENT
Start: 2021-07-28 | End: 2021-08-30 | Stop reason: SDUPTHER

## 2021-07-30 PROBLEM — Z89.421 HISTORY OF AMPUTATION OF LESSER TOE OF RIGHT FOOT (HCC): Status: ACTIVE | Noted: 2021-07-30

## 2021-08-04 ENCOUNTER — OFFICE VISIT (OUTPATIENT)
Dept: FAMILY MEDICINE CLINIC | Facility: CLINIC | Age: 61
End: 2021-08-04
Payer: COMMERCIAL

## 2021-08-04 VITALS
DIASTOLIC BLOOD PRESSURE: 88 MMHG | RESPIRATION RATE: 22 BRPM | WEIGHT: 278 LBS | BODY MASS INDEX: 49.26 KG/M2 | HEIGHT: 63 IN | HEART RATE: 96 BPM | SYSTOLIC BLOOD PRESSURE: 128 MMHG

## 2021-08-04 DIAGNOSIS — R82.81 PYURIA: ICD-10-CM

## 2021-08-04 DIAGNOSIS — E66.01 MORBID OBESITY (HCC): ICD-10-CM

## 2021-08-04 DIAGNOSIS — K44.9 HIATAL HERNIA: ICD-10-CM

## 2021-08-04 DIAGNOSIS — F41.9 ANXIETY: ICD-10-CM

## 2021-08-04 DIAGNOSIS — I10 BENIGN ESSENTIAL HYPERTENSION: ICD-10-CM

## 2021-08-04 DIAGNOSIS — Z78.0 POSTMENOPAUSAL: ICD-10-CM

## 2021-08-04 DIAGNOSIS — E03.9 HYPOTHYROIDISM, UNSPECIFIED TYPE: ICD-10-CM

## 2021-08-04 DIAGNOSIS — R13.10 DYSPHAGIA, UNSPECIFIED TYPE: ICD-10-CM

## 2021-08-04 DIAGNOSIS — R82.90 ABNORMAL URINE: ICD-10-CM

## 2021-08-04 DIAGNOSIS — Z00.00 MEDICARE ANNUAL WELLNESS VISIT, SUBSEQUENT: Primary | ICD-10-CM

## 2021-08-04 DIAGNOSIS — R60.0 LOCALIZED EDEMA: ICD-10-CM

## 2021-08-04 DIAGNOSIS — R60.0 LOCALIZED EDEMA: Primary | ICD-10-CM

## 2021-08-04 LAB
SL AMB  POCT GLUCOSE, UA: NORMAL
SL AMB LEUKOCYTE ESTERASE,UA: NORMAL
SL AMB POCT BILIRUBIN,UA: NORMAL
SL AMB POCT BLOOD,UA: NORMAL
SL AMB POCT CLARITY,UA: CLEAR
SL AMB POCT COLOR,UA: NORMAL
SL AMB POCT KETONES,UA: NORMAL
SL AMB POCT NITRITE,UA: NORMAL
SL AMB POCT PH,UA: 5.5
SL AMB POCT SPECIFIC GRAVITY,UA: 1.02
SL AMB POCT URINE PROTEIN: 30
SL AMB POCT UROBILINOGEN: 0.2

## 2021-08-04 PROCEDURE — 81002 URINALYSIS NONAUTO W/O SCOPE: CPT | Performed by: FAMILY MEDICINE

## 2021-08-04 PROCEDURE — 80307 DRUG TEST PRSMV CHEM ANLYZR: CPT | Performed by: FAMILY MEDICINE

## 2021-08-04 PROCEDURE — 99214 OFFICE O/P EST MOD 30 MIN: CPT | Performed by: FAMILY MEDICINE

## 2021-08-04 PROCEDURE — G0439 PPPS, SUBSEQ VISIT: HCPCS | Performed by: FAMILY MEDICINE

## 2021-08-04 RX ORDER — LORAZEPAM 2 MG/1
2 TABLET ORAL EVERY 6 HOURS PRN
Qty: 360 TABLET | Refills: 0 | Status: SHIPPED | OUTPATIENT
Start: 2021-08-04 | End: 2021-11-05 | Stop reason: SDUPTHER

## 2021-08-04 RX ORDER — HYDROCHLOROTHIAZIDE 25 MG/1
25 TABLET ORAL DAILY PRN
Qty: 90 TABLET | Refills: 1 | Status: SHIPPED | OUTPATIENT
Start: 2021-08-04 | End: 2022-01-31

## 2021-08-04 NOTE — LETTER
August 4, 2021     Patient: Keny Kaur   YOB: 1960   Date of Visit: 8/4/2021       To Whom it May Concern:    Jocelyn Pinedo is under my professional care  She was seen in my office on 8/4/2021  Her  brought her to her appointment and should be excused from work due to this    If you have any questions or concerns, please don't hesitate to call           Sincerely,          Vipin Bañuelos MD        CC: No Recipients

## 2021-08-04 NOTE — ASSESSMENT & PLAN NOTE
Prefers Lorazepam  And would like to take that qid and stop clonazepam, will  Taper off Klonopin 1 at bedtime for a week, them 1/2 at bedtime for a week, then 1/2 every other
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
## 2021-08-04 NOTE — PROGRESS NOTES
Assessment and Plan:    Problem List Items Addressed This Visit     None                 Diagnoses and all orders for this visit:    Medicare annual wellness visit, subsequent    Dysphagia, unspecified type    Hiatal hernia    Hypothyroidism, unspecified type  -     TSH, 3rd generation; Future  -     Lipid panel; Future    Benign essential hypertension  -     Comprehensive metabolic panel; Future  -     CBC and differential; Future    Morbid obesity (HCC)  -     Cortisol Level, AM Specimen; Future    Anxiety  -     LORazepam (ATIVAN) 2 mg tablet; Take 1 tablet (2 mg total) by mouth every 6 (six) hours as needed for anxiety              Subjective:      Patient ID: Juan Olson is a 59 y o  female  CC:    Chief Complaint   Patient presents with    Follow-up    Medication Problem       HPI:    F/u anxiety, multiple medical problems,due for lab,retaining some fluid , stressed out with GI issues-has to go to [facility] for sx      The following portions of the patient's history were reviewed and updated as appropriate: allergies, current medications, past family history, past medical history, past social history, past surgical history and problem list       Review of Systems   Constitutional: Negative for activity change, appetite change and fatigue  Respiratory: Negative for shortness of breath  Cardiovascular: Positive for leg swelling  Negative for chest pain and palpitations  Neurological: Negative for dizziness and headaches  Hematological: Negative for adenopathy  Psychiatric/Behavioral: Positive for dysphoric mood  The patient is nervous/anxious  Data to review:       Objective:    Vitals:    08/04/21 1229   BP: 128/88   BP Location: Left arm   Patient Position: Sitting   Cuff Size: Adult   Pulse: (!) 114   Resp: 22   Weight: 126 kg (278 lb)   Height: 5' 3" (1 6 m)        Physical Exam  Vitals reviewed  Constitutional:       Appearance: Normal appearance  She is obese     Neck:      Vascular: No carotid bruit  Cardiovascular:      Rate and Rhythm: Normal rate and regular rhythm  Pulses: Normal pulses  Heart sounds: Normal heart sounds  Pulmonary:      Effort: Pulmonary effort is normal       Breath sounds: Normal breath sounds  Musculoskeletal:      Right lower leg: Edema present  Left lower leg: Edema present  Lymphadenopathy:      Cervical: No cervical adenopathy  Neurological:      Mental Status: She is alert  BMI Counseling: Body mass index is 49 25 kg/m²  The BMI is above normal  Nutrition recommendations include reducing portion sizes, decreasing overall calorie intake, 3-5 servings of fruits/vegetables daily, reducing fast food intake and consuming healthier snacks  Exercise recommendations include exercising 3-5 times per week  Depression Screening Follow-up Plan: Patient's depression screening was positive with a PHQ-2 score of 2  Their PHQ-9 score was 10  Patient assessed for underlying major depression  They have no active suicidal ideations  Brief counseling provided and recommend additional follow-up/re-evaluation next office visit  Continue regular follow-up with their psychologist/therapist/psychiatrist who is managing their mental health condition(s)

## 2021-08-04 NOTE — PATIENT INSTRUCTIONS
Prefers Lorazepam  And would like to take that qid and stop clonazepam, will  Taper off Klonopin 1 at bedtime for a week, them 1/2 at bedtime for a week, then 1/2 every other night, hydrochlorothiazide as needed for edema

## 2021-08-16 LAB
AMPHETAMINES UR QL SCN: NORMAL
BARBITURATES UR QL SCN: NEGATIVE NG/ML
BENZODIAZ UR QL: NEGATIVE NG/ML
BZE UR QL: NEGATIVE NG/ML
CANNABINOIDS UR QL SCN: NEGATIVE NG/ML
METHADONE UR QL SCN: NEGATIVE NG/ML
OPIATES UR QL: NEGATIVE NG/ML
PCP UR QL: NEGATIVE NG/ML
PROPOXYPH UR QL SCN: NEGATIVE NG/ML

## 2021-08-18 ENCOUNTER — HOSPITAL ENCOUNTER (OUTPATIENT)
Dept: MAMMOGRAPHY | Facility: MEDICAL CENTER | Age: 61
Discharge: HOME | End: 2021-08-18
Payer: COMMERCIAL

## 2021-08-18 VITALS — BODY MASS INDEX: 49.25 KG/M2 | HEIGHT: 63 IN

## 2021-08-18 DIAGNOSIS — Z12.31 ENCOUNTER FOR SCREENING MAMMOGRAM FOR MALIGNANT NEOPLASM OF BREAST: ICD-10-CM

## 2021-08-18 PROCEDURE — 77063 BREAST TOMOSYNTHESIS BI: CPT

## 2021-08-18 PROCEDURE — 77067 SCR MAMMO BI INCL CAD: CPT

## 2021-08-24 ENCOUNTER — TELEPHONE (OUTPATIENT)
Dept: OTHER | Facility: OTHER | Age: 61
End: 2021-08-24

## 2021-08-24 NOTE — TELEPHONE ENCOUNTER
Patient's  called to cancel appointment schedule for tomorrow Wednesday Aug 25, 2021  2:15 PM  NEW PATIENT PG  MD Gayathri Sher [facility]   Due to she has an conflicting appointment  They will call back to reschedule

## 2021-08-27 ENCOUNTER — OFFICE VISIT (OUTPATIENT)
Dept: CARDIOLOGY CLINIC | Facility: CLINIC | Age: 61
End: 2021-08-27
Payer: COMMERCIAL

## 2021-08-27 VITALS
WEIGHT: 270.8 LBS | HEART RATE: 111 BPM | BODY MASS INDEX: 47.98 KG/M2 | DIASTOLIC BLOOD PRESSURE: 88 MMHG | HEIGHT: 63 IN | SYSTOLIC BLOOD PRESSURE: 122 MMHG

## 2021-08-27 DIAGNOSIS — E66.01 CLASS 3 SEVERE OBESITY DUE TO EXCESS CALORIES WITH BODY MASS INDEX (BMI) OF 45.0 TO 49.9 IN ADULT, UNSPECIFIED WHETHER SERIOUS COMORBIDITY PRESENT (HCC): ICD-10-CM

## 2021-08-27 DIAGNOSIS — Z01.810 PRE-OPERATIVE CARDIOVASCULAR EXAMINATION: ICD-10-CM

## 2021-08-27 DIAGNOSIS — I10 ESSENTIAL HYPERTENSION: Primary | ICD-10-CM

## 2021-08-27 DIAGNOSIS — G47.33 OBSTRUCTIVE SLEEP APNEA: ICD-10-CM

## 2021-08-27 PROCEDURE — 99204 OFFICE O/P NEW MOD 45 MIN: CPT | Performed by: INTERNAL MEDICINE

## 2021-08-27 PROCEDURE — 93000 ELECTROCARDIOGRAM COMPLETE: CPT | Performed by: INTERNAL MEDICINE

## 2021-08-27 NOTE — PROGRESS NOTES
[facility] Cardiology  Office Consultation  Yasmeen Hernandez 59 y o  female MRN: [id number]        Chief Complaint    Chief Complaint   Patient presents with    New Patient Visit     Cardiac clearance       Referring Provider: Self, Referral    Impression & Plan:    1  Essential hypertension  Well controlled on current regimen  - POCT ECG    2  Obstructive sleep apnea      3  Class 3 severe obesity due to excess calories with body mass index (BMI) of 45 0 to 49 9 in adult, unspecified whether serious comorbidity present ([facility] [address] )      4  Pre-operative cardiovascular examination      No further cardiac workup recommended prior to surgery  Patient is low risk for perioperative MI or cardiac arrest by [facility] score, 0 1% risk  No further cardiology recommendations prior to proceeding with planned surgeries  We will see Yasmeen Hernandez back in as needed for follow-up  HPI: Yasmeen Hernandez is a 59y o  year old female presenting prior to redo bariatric surgery and hiatal hernia repair  She had gastric sleeve 4 years ago, and has been notified that the sleeve she had was under recall for complications  She has had development of a diaphragmatic hernia  She has a hiatal  Hernia and Schatzki's ring at [facility]  She does not have AGUILAR  She does not have chest pain or pressure with exertion  She does daily chores such as taking out the trash, running the vacuum, etc without any symptoms  She does get short of breath outside in the heat when exerting herself related to asthma and deconditioning  She has never had an MI, stroke, TIA, or any form of vascular disease  Her blood pressure is well controlled on amlodipine for multiple years         Review of Systems      Past Medical History:   Diagnosis Date    ADD (attention deficit disorder)     Anxiety     Arthritis     Asthma     Bipolar 1 disorder (HCC)     Cellulitis     Cellulitis of leg     Depression     Difficulty swallowing     Disease of thyroid gland hypothyroid    Dysphagia     Elevated lipids     Hearing loss     Hiatal hernia     diaphragmatic    Hyperlipidemia     Hypertension     Psychiatric disorder     Bipolar    Schatzki's ring     Sleep apnea     Wears eyeglasses      Past Surgical History:   Procedure Laterality Date    AMPUTATION      Right little toe    BARIATRIC SURGERY      gastric sleeve    BUNIONECTOMY      CHOLECYSTECTOMY      ESOPHAGEAL DILATION      FOREARM SURGERY Left     FRACTURE REPAIR WITH METAL AND METAL REPLACED    GASTRIC BYPASS      HYSTERECTOMY  1998    age 34    JOINT REPLACEMENT      knee    KNEE SURGERY Left     OK COLONOSCOPY FLX DX W/COLLJ SPEC WHEN PFRMD N/A 6/17/2016    Procedure: EGD AND COLONOSCOPY;  Surgeon: Kasey Harry MD;  [facility]; Service: Gastroenterology    OK ESOPHAGOGASTRODUODENOSCOPY TRANSORAL DIAGNOSTIC N/A 4/11/2017    Procedure: ESOPHAGOGASTRODUODENOSCOPY WITH BALLOON DILATATION;  Surgeon: Tashia Tapia MD;  Location[facility];   Service: Gastroenterology    REPLACEMENT TOTAL KNEE      Left Side    ROTATOR CUFF REPAIR Left     SKIN LESION EXCISION      thighs    SLEEVE GASTROPLASTY      gastric sleeve    TONSILLECTOMY       Social History     Substance and Sexual Activity   Alcohol Use Yes    Comment: occasionally      Social History     Substance and Sexual Activity   Drug Use No     Social History     Tobacco Use   Smoking Status Former Smoker    Quit date: 2011    Years since quitting: 10 6   Smokeless Tobacco Never Used     Family History   Problem Relation Age of Onset   [employer] Breast cancer Mother 62    Lung cancer Mother     Alcohol abuse Father     Substance Abuse Father     Prostate cancer Father 79    Anxiety disorder Brother     Alcohol abuse Brother     Substance Abuse Brother     Mental illness Brother     Hepatitis Brother     Liver cancer Maternal Grandmother 72    Lung cancer Maternal Grandfather     Brain cancer Maternal Uncle     Breast cancer Paternal Aunt 71    Mental illness Family     Lung cancer Maternal Aunt     No Known Problems Paternal Uncle     No Known Problems Paternal Grandmother     No Known Problems Paternal Grandfather        Allergies: Allergies   Allergen Reactions    Oxycontin [Oxycodone]      Some type of reaction 20 years ago where she had trouble breathing   Vilazodone Hcl     Augmentin [Amoxicillin-Pot Clavulanate] GI Intolerance     Doesn't know    Clarithromycin GI Intolerance    Erythromycin GI Intolerance    Other GI Intolerance     vibryd       Medications (as of START of this encounter): Outpatient Medications Prior to Visit   Medication Sig Dispense Refill    albuterol (PROVENTIL HFA,VENTOLIN HFA) 90 mcg/act inhaler INHALE 2 PUFFS BY MOUTH EVERY 4 HOURS AS NEEDED FOR WHEEZING OR SHORTNESS OF BREATH 42 5 g 3    amLODIPine (NORVASC) 10 mg tablet TAKE 1 TABLET BY MOUTH  DAILY 90 tablet 1    amphetamine-dextroamphetamine (ADDERALL) 30 MG tablet Take 1 tablet (30 mg total) by mouth 2 (two) times a dayMax Daily Amount: 60 mg 60 tablet 0    calcium citrate-Vitamin D (Calcium Citrate + D3) 200 mg-250 units Take by mouth      FLUoxetine (PROzac) 20 mg capsule Take 2 capsules (40 mg total) by mouth daily 180 capsule 1    fluticasone (FLONASE) 50 mcg/act nasal spray 1 spray into each nostril daily 17 g 3    fluticasone-salmeterol (ADVAIR, WIXELA) 100-50 mcg/dose inhaler Inhale 1 puff 2 (two) times a day Rinse mouth after use   180 each 3    hydrochlorothiazide (HYDRODIURIL) 25 mg tablet Take 1 tablet (25 mg total) by mouth daily as needed (edema) 90 tablet 1    ipratropium-albuterol (DUO-NEB) 0 5-2 5 mg/3 mL nebulizer solution Take 1 vial (3 mL total) by nebulization every 6 (six) hours as needed for wheezing or shortness of breath 60 vial 2    levothyroxine 100 mcg tablet TAKE 1 TABLET BY MOUTH  DAILY 90 tablet 1    LORazepam (ATIVAN) 2 mg tablet Take 1 tablet (2 mg total) by mouth every 6 (six) hours as needed for anxiety 360 tablet 0    montelukast (SINGULAIR) 10 mg tablet TAKE 1 TABLET BY MOUTH  DAILY 90 tablet 1    omeprazole (PriLOSEC) 20 mg delayed release capsule omeprazole 20 mg capsule,delayed release      Pediatric Multivitamins-Fl (MULTIVITAMINS/FL PO) Take by mouth      QUEtiapine (SEROquel) 100 mg tablet Take 1 tablet (100 mg total) by mouth daily at bedtime 90 tablet 1     No facility-administered medications prior to visit  Vitals:    08/27/21 0911   BP: 122/88   Pulse: (!) 111     Weight (last 2 days)     Date/Time   Weight    08/27/21 0911   123 (270 8)              General: Milvia Kline is a well appearing female, in no acute distress, sitting comfortably  HEENT: moist mucous membranes, EOMI  Neck:  No JVD, supple, trachea midline   Cardiovascular: unremarkable S1/S2, regulard rhythm tachycardic, no murmurs, rubs or gallops   Pulmonary: normal respiratory effort, CTAB   Abdomen: soft and nondistended  Extremities: No lower extremity edema  Warm and well perfused extremities  Neuro: no focal motor deficits, AAOx3 (person, place, time)  Psych: Normal mood and affect, cooperative      Laboratory Studies:    Laboratory studies personally reviewed    Cardiac testing:     EKG reviewed personally: Today: Sinus tachycardia, 111 bpm, normal axis, normal intervals  Other than sinus tachycardia, normal study  Time Spent:  Total time (face-to-face and non-face-to-face) spent on today's visit was 40 minutes  This includes preparation for the visits (i e  reviewing test results), performance of a medically appropriate history and examination, and orders for medications, tests or other procedures  This time is exclusive of procedures performed and time spent teaching  Lm Gardner MD    This note was completed in part utilizing [employer] direct voice recognition software   Grammatical errors, random word insertion, spelling mistakes, occasional wrong word or "sound-alike" substitutions and incomplete sentences may be an occasional consequence of the system secondary to software limitations, ambient noise and hardware issues  At the time of dictation, efforts were made to edit, clarify and /or correct errors  Please read the chart carefully and recognize, using context, where substitutions have occurred  If you have any questions or concerns about the context, text or information contained within the body of this dictation, please contact myself, the provider, for further clarification

## 2021-08-30 DIAGNOSIS — F98.8 ATTENTION DEFICIT DISORDER (ADD) WITHOUT HYPERACTIVITY: ICD-10-CM

## 2021-08-31 RX ORDER — DEXTROAMPHETAMINE SACCHARATE, AMPHETAMINE ASPARTATE, DEXTROAMPHETAMINE SULFATE AND AMPHETAMINE SULFATE 7.5; 7.5; 7.5; 7.5 MG/1; MG/1; MG/1; MG/1
30 TABLET ORAL 2 TIMES DAILY
Qty: 60 TABLET | Refills: 0 | Status: SHIPPED | OUTPATIENT
Start: 2021-08-31 | End: 2021-09-27 | Stop reason: SDUPTHER

## 2021-09-07 ENCOUNTER — TELEPHONE (OUTPATIENT)
Dept: FAMILY MEDICINE CLINIC | Facility: CLINIC | Age: 61
End: 2021-09-07

## 2021-09-07 NOTE — TELEPHONE ENCOUNTER
CALLED REGARDING WIFE RECEIIVING A JURY SUMMONS AND  CALLED MATT REGARDING QUESTION 7 - DO YOU HAVE A MENTAL DISABILITY & ALSO A PHYSICAL DISABLITY, WHICH SHE HAS  A FEW  COURTHOUSE NEEDS A LETTER FAXED CONFIRMING HER DISABILITIES BY DR Carroll Ahumada - FAXED [phone]  JUROR # [id number] MUST BE INCLUDED  PLEASE STATE ALL OF PATIENT'S DISABILITIES - MENTAL & PHYSICAL  TO THE [facility] OF COMMON PLEAS

## 2021-09-14 DIAGNOSIS — F32.A DEPRESSION, UNSPECIFIED DEPRESSION TYPE: ICD-10-CM

## 2021-09-14 RX ORDER — FLUOXETINE HYDROCHLORIDE 20 MG/1
40 CAPSULE ORAL DAILY
Qty: 180 CAPSULE | Refills: 1 | Status: SHIPPED | OUTPATIENT
Start: 2021-09-14 | End: 2022-03-14

## 2021-09-21 DIAGNOSIS — J45.20 MILD INTERMITTENT ASTHMA WITHOUT COMPLICATION: ICD-10-CM

## 2021-09-21 RX ORDER — ALBUTEROL SULFATE 90 UG/1
AEROSOL, METERED RESPIRATORY (INHALATION)
Qty: 42.5 G | Refills: 3 | Status: SHIPPED | OUTPATIENT
Start: 2021-09-21

## 2021-09-27 DIAGNOSIS — F98.8 ATTENTION DEFICIT DISORDER (ADD) WITHOUT HYPERACTIVITY: ICD-10-CM

## 2021-09-28 RX ORDER — DEXTROAMPHETAMINE SACCHARATE, AMPHETAMINE ASPARTATE, DEXTROAMPHETAMINE SULFATE AND AMPHETAMINE SULFATE 7.5; 7.5; 7.5; 7.5 MG/1; MG/1; MG/1; MG/1
30 TABLET ORAL 2 TIMES DAILY
Qty: 60 TABLET | Refills: 0 | Status: SHIPPED | OUTPATIENT
Start: 2021-09-28 | End: 2021-10-25 | Stop reason: SDUPTHER

## 2021-10-25 DIAGNOSIS — F98.8 ATTENTION DEFICIT DISORDER (ADD) WITHOUT HYPERACTIVITY: ICD-10-CM

## 2021-10-25 RX ORDER — DEXTROAMPHETAMINE SACCHARATE, AMPHETAMINE ASPARTATE, DEXTROAMPHETAMINE SULFATE AND AMPHETAMINE SULFATE 7.5; 7.5; 7.5; 7.5 MG/1; MG/1; MG/1; MG/1
30 TABLET ORAL 2 TIMES DAILY
Qty: 60 TABLET | Refills: 0 | Status: SHIPPED | OUTPATIENT
Start: 2021-10-25 | End: 2021-10-27 | Stop reason: SDUPTHER

## 2021-10-27 ENCOUNTER — TELEPHONE (OUTPATIENT)
Dept: FAMILY MEDICINE CLINIC | Facility: CLINIC | Age: 61
End: 2021-10-27

## 2021-10-27 DIAGNOSIS — F98.8 ATTENTION DEFICIT DISORDER (ADD) WITHOUT HYPERACTIVITY: ICD-10-CM

## 2021-10-27 RX ORDER — DEXTROAMPHETAMINE SACCHARATE, AMPHETAMINE ASPARTATE, DEXTROAMPHETAMINE SULFATE AND AMPHETAMINE SULFATE 7.5; 7.5; 7.5; 7.5 MG/1; MG/1; MG/1; MG/1
30 TABLET ORAL 2 TIMES DAILY
Qty: 60 TABLET | Refills: 0 | Status: SHIPPED | OUTPATIENT
Start: 2021-10-27 | End: 2021-11-23 | Stop reason: SDUPTHER

## 2021-11-05 ENCOUNTER — OFFICE VISIT (OUTPATIENT)
Dept: FAMILY MEDICINE CLINIC | Facility: CLINIC | Age: 61
End: 2021-11-05
Payer: COMMERCIAL

## 2021-11-05 VITALS
BODY MASS INDEX: 50.32 KG/M2 | HEIGHT: 63 IN | OXYGEN SATURATION: 98 % | SYSTOLIC BLOOD PRESSURE: 124 MMHG | DIASTOLIC BLOOD PRESSURE: 86 MMHG | WEIGHT: 284 LBS | HEART RATE: 108 BPM | RESPIRATION RATE: 18 BRPM

## 2021-11-05 DIAGNOSIS — F41.9 ANXIETY: ICD-10-CM

## 2021-11-05 DIAGNOSIS — E78.5 HYPERLIPIDEMIA, UNSPECIFIED HYPERLIPIDEMIA TYPE: ICD-10-CM

## 2021-11-05 DIAGNOSIS — K91.2 POSTGASTRECTOMY MALABSORPTION: ICD-10-CM

## 2021-11-05 DIAGNOSIS — E03.9 HYPOTHYROIDISM, UNSPECIFIED TYPE: ICD-10-CM

## 2021-11-05 DIAGNOSIS — F98.8 ATTENTION DEFICIT DISORDER, UNSPECIFIED HYPERACTIVITY PRESENCE: ICD-10-CM

## 2021-11-05 DIAGNOSIS — Z90.3 POSTGASTRECTOMY MALABSORPTION: ICD-10-CM

## 2021-11-05 DIAGNOSIS — I10 BENIGN ESSENTIAL HYPERTENSION: Primary | ICD-10-CM

## 2021-11-05 DIAGNOSIS — F31.78 BIPOLAR 1 DISORDER, MIXED, FULL REMISSION (HCC): ICD-10-CM

## 2021-11-05 PROCEDURE — 99214 OFFICE O/P EST MOD 30 MIN: CPT | Performed by: FAMILY MEDICINE

## 2021-11-05 RX ORDER — LORAZEPAM 2 MG/1
2 TABLET ORAL EVERY 6 HOURS PRN
Qty: 360 TABLET | Refills: 0 | Status: SHIPPED | OUTPATIENT
Start: 2021-11-05 | End: 2022-02-04 | Stop reason: SDUPTHER

## 2021-11-05 RX ORDER — QUETIAPINE FUMARATE 100 MG/1
100 TABLET, FILM COATED ORAL
Qty: 90 TABLET | Refills: 1 | Status: SHIPPED | OUTPATIENT
Start: 2021-11-05 | End: 2021-11-30

## 2021-11-22 ENCOUNTER — VBI (OUTPATIENT)
Dept: ADMINISTRATIVE | Facility: OTHER | Age: 61
End: 2021-11-22

## 2021-11-23 DIAGNOSIS — F98.8 ATTENTION DEFICIT DISORDER (ADD) WITHOUT HYPERACTIVITY: ICD-10-CM

## 2021-11-23 RX ORDER — DEXTROAMPHETAMINE SACCHARATE, AMPHETAMINE ASPARTATE, DEXTROAMPHETAMINE SULFATE AND AMPHETAMINE SULFATE 7.5; 7.5; 7.5; 7.5 MG/1; MG/1; MG/1; MG/1
30 TABLET ORAL 2 TIMES DAILY
Qty: 60 TABLET | Refills: 0 | Status: SHIPPED | OUTPATIENT
Start: 2021-11-23 | End: 2021-12-27 | Stop reason: SDUPTHER

## 2021-11-30 DIAGNOSIS — F31.78 BIPOLAR 1 DISORDER, MIXED, FULL REMISSION (HCC): ICD-10-CM

## 2021-11-30 RX ORDER — QUETIAPINE FUMARATE 100 MG/1
TABLET, FILM COATED ORAL
Qty: 90 TABLET | Refills: 1 | Status: SHIPPED | OUTPATIENT
Start: 2021-11-30 | End: 2022-05-02 | Stop reason: SDUPTHER

## 2021-12-15 ENCOUNTER — VBI (OUTPATIENT)
Dept: ADMINISTRATIVE | Facility: OTHER | Age: 61
End: 2021-12-15

## 2021-12-18 DIAGNOSIS — E03.9 HYPOTHYROIDISM, UNSPECIFIED TYPE: ICD-10-CM

## 2021-12-20 RX ORDER — LEVOTHYROXINE SODIUM 0.1 MG/1
TABLET ORAL
Qty: 90 TABLET | Refills: 1 | Status: SHIPPED | OUTPATIENT
Start: 2021-12-20 | End: 2022-04-25

## 2021-12-27 ENCOUNTER — TELEPHONE (OUTPATIENT)
Dept: FAMILY MEDICINE CLINIC | Facility: CLINIC | Age: 61
End: 2021-12-27

## 2021-12-27 DIAGNOSIS — F98.8 ATTENTION DEFICIT DISORDER (ADD) WITHOUT HYPERACTIVITY: ICD-10-CM

## 2021-12-27 RX ORDER — DEXTROAMPHETAMINE SACCHARATE, AMPHETAMINE ASPARTATE, DEXTROAMPHETAMINE SULFATE AND AMPHETAMINE SULFATE 7.5; 7.5; 7.5; 7.5 MG/1; MG/1; MG/1; MG/1
30 TABLET ORAL 2 TIMES DAILY
Qty: 60 TABLET | Refills: 0 | Status: SHIPPED | OUTPATIENT
Start: 2021-12-27 | End: 2022-01-26 | Stop reason: SDUPTHER

## 2022-01-17 ENCOUNTER — TELEPHONE (OUTPATIENT)
Dept: FAMILY MEDICINE CLINIC | Facility: CLINIC | Age: 62
End: 2022-01-17

## 2022-01-17 NOTE — TELEPHONE ENCOUNTER
PT CALLED IN STATING THAT SHE BELIEVES THAT SHE HAS BRONCHITIS  PT STATES LAST TIME THIS HAPPENED DR Sharmaine Su DIDN'T HAVE HER COME IN AND PRESCRIBED HER MEDICATION  PT WANTS TO KNOW IF THAT CAN BE DONE AGAIN  PT WOULD LIKE TO BE NOTIFIED OF WHATEVER DECISION THE DOCTOR MAKES  PLEASE ADVISE   Prachi Heller

## 2022-01-18 ENCOUNTER — TELEPHONE (OUTPATIENT)
Dept: FAMILY MEDICINE CLINIC | Facility: CLINIC | Age: 62
End: 2022-01-18

## 2022-01-18 NOTE — TELEPHONE ENCOUNTER
If not vaccinated needs virtual visit tomorrow and really needs to go for a covid test-does she wish me to enter order for test, will not just rx antibiotics without an eval because of covid

## 2022-01-18 NOTE — TELEPHONE ENCOUNTER
PT RYAN GODOY, ASKING IF SOMETHING CAN BE CALLED IN FOR KIMS BRONCHITIS, SHE IS HAVING CHEST CONGESTION, COUGH, BODY ACHES, SHE IS NOT VACCINATED FOR COVID AND SHE DID NOT TAKE A HOME TEST  SHE SAID SHE KNOWS HER BODY  PT DOES HAVE A VIRTUAL APPT WITH DR Dk Yuen ON 1-    PT CAN BE REACHED [phone]

## 2022-01-20 ENCOUNTER — TELEMEDICINE (OUTPATIENT)
Dept: FAMILY MEDICINE CLINIC | Facility: CLINIC | Age: 62
End: 2022-01-20
Payer: COMMERCIAL

## 2022-01-20 VITALS — BODY MASS INDEX: 50.32 KG/M2 | WEIGHT: 284 LBS | HEIGHT: 63 IN

## 2022-01-20 DIAGNOSIS — K91.2 POSTGASTRECTOMY MALABSORPTION: ICD-10-CM

## 2022-01-20 DIAGNOSIS — Z89.421 HISTORY OF AMPUTATION OF LESSER TOE OF RIGHT FOOT (HCC): ICD-10-CM

## 2022-01-20 DIAGNOSIS — F98.8 ATTENTION DEFICIT DISORDER, UNSPECIFIED HYPERACTIVITY PRESENCE: ICD-10-CM

## 2022-01-20 DIAGNOSIS — J45.41 MODERATE PERSISTENT ASTHMA WITH ACUTE EXACERBATION: ICD-10-CM

## 2022-01-20 DIAGNOSIS — E66.01 MORBID OBESITY (HCC): ICD-10-CM

## 2022-01-20 DIAGNOSIS — Z90.3 POSTGASTRECTOMY MALABSORPTION: ICD-10-CM

## 2022-01-20 DIAGNOSIS — F31.9 BIPOLAR I DISORDER (HCC): ICD-10-CM

## 2022-01-20 DIAGNOSIS — B34.9 VIRAL INFECTION, UNSPECIFIED: Primary | ICD-10-CM

## 2022-01-20 DIAGNOSIS — G47.33 OBSTRUCTIVE SLEEP APNEA: ICD-10-CM

## 2022-01-20 DIAGNOSIS — I10 BENIGN ESSENTIAL HYPERTENSION: ICD-10-CM

## 2022-01-20 PROCEDURE — 99442 PR PHYS/QHP TELEPHONE EVALUATION 11-20 MIN: CPT | Performed by: FAMILY MEDICINE

## 2022-01-20 RX ORDER — METHYLPREDNISOLONE 4 MG/1
TABLET ORAL
Qty: 21 EACH | Refills: 0 | Status: SHIPPED | OUTPATIENT
Start: 2022-01-20 | End: 2022-06-01

## 2022-01-20 NOTE — ASSESSMENT & PLAN NOTE
Patient's history of bipolar  currently on several medications including Prozac and Seroquel  Follow-up in the future  recommend Psychiatry or counseling

## 2022-01-20 NOTE — PROGRESS NOTES
COVID-19 Outpatient Progress Note    Assessment/Plan:    Problem List Items Addressed This Visit     Asthma     Patient currently having an asthma exacerbation  Question cause  Could be COVID, could be infection, could be cold weather  At this point, I do agree that she could use steroids, therefore a Medrol Dosepak  I am trying to keep it at lower dose  reviewed side effects of recurrent use of steroids as well  Recommend restart of Advair  She had run out before, therefore I would restart that     continue albuterol  continue Singulair  Relevant Medications    fluticasone-salmeterol (Advair) 100-50 mcg/dose inhaler    methylPREDNISolone 4 MG tablet therapy pack    Attention deficit disorder (ADD)     Patient to follow with Dr Abdulaziz Todd  She has been on Adderall  Benign essential hypertension     Unfortunate, the patient was not able to check her blood pressure today  Previously it has been quite good  continues on amlodipine, hydrochlorothiazide         Bipolar I disorder ([facility] [address] )     Patient's history of bipolar  currently on several medications including Prozac and Seroquel  Follow-up in the future  recommend Psychiatry or counseling  History of amputation of lesser toe of right foot ([facility] [address] )     Patient did have amputation previously  Was postop for infection  Morbid obesity ([facility] [address] )     Patient's last BMI was quite a bit elevated  she is planning on having a gastric sleeve redo  Patient needs to have blood work done  Reviewed that with her it specifically  orders are already in the system  These labs were ordered previously by Dr Abdulaziz Todd, and she had recommended to the patient that she have them done several months ago  Obstructive sleep apnea     Follow in the future at office visit  No change at the moment  Postgastrectomy malabsorption     Patient does have post gastrectomy malabsorption      Unfortunately, she also is due for labs which she has not done  please see discussion of obesity  Patient was informed previously that she should have blood work done  Reviewed with her again that she does need blood work done  Other Visit Diagnoses     Viral infection, unspecified    -  Primary    Possible COVI D  Recommend testing  Could be making asthma worse  If COVID negative, just the URI  Relevant Orders    [facility]         Disposition:     Referred patient to centralized site to test for COVID-19  I have spent 20 minutes directly with the patient  Encounter provider Fahad Santana MD    Provider located at [address]  [address] [facility] [address] [place] [id number] [phone]    Recent Visits  Date Type Provider Dept   01/18/22 Telephone Angela [address] [facility] Primary Care   01/17/22 Telephone Dianne Borja  [address] Primary Care   Showing recent visits within past 7 days and meeting all other requirements  Today's Visits  Date Type Provider Dept   01/20/22 Telemedicine Fahad Santana MD [facility] Primary Care   Showing today's visits and meeting all other requirements  Future Appointments  No visits were found meeting these conditions  Showing future appointments within next 150 days and meeting all other requirements     This virtual check-in was done via telephone and she agrees to proceed  Patient agrees to participate in a virtual check in via telephone or video visit instead of presenting to the office to address urgent/immediate medical needs  Patient is aware this is a billable service  After connecting through Telephone, the patient was identified by name and date of birth  Felisa Basilio was informed that this was a telemedicine visit and that the exam was being conducted confidentially over secure lines  My office door was closed  No one else was in the room   Felisa Basilio acknowledged consent and understanding of privacy and security of the telemedicine visit  I informed the patient that I have reviewed her record in Epic and presented the opportunity for her to ask any questions regarding the visit today  The patient agreed to participate  It was my intent to perform this visit via video technology but the patient was not able to do a video connection so the visit was completed via audio telephone only  Verification of patient location:  Patient is located in the following state in which I hold an active license: [place]    Subjective:   Estrellita Johnson is a 59 y o  female who is concerned about COVID-19  Patient's symptoms include fever, fatigue, malaise, nasal congestion, rhinorrhea, sore throat, cough, shortness of breath, myalgias and headache  Patient denies chills, anosmia, loss of taste, chest tightness, abdominal pain, nausea, vomiting and diarrhea  COVID-19 vaccination status: Not vaccinated    Exposure:   Contact with a person who is under investigation (PUI) for or who is positive for COVID-19 within the last 14 days?: No    Hospitalized recently for fever and/or lower respiratory symptoms?: No      Currently a healthcare worker that is involved in direct patient care?: No      Works in a special setting where the risk of COVID-19 transmission may be high? (this may include long-term care, correctional and senior care facilities; homeless shelters; assisted-living facilities and group homes ): No      Resident in a special setting where the risk of COVID-19 transmission may be high? (this may include long-term care, correctional and senior care facilities; homeless shelters; assisted-living facilities and group homes ): No      Patient feels she has bronchitis  Has had similar in past   PN drip, phlegm, cough, wheezes  Has aches  Has albuterol, using 4 times a day  Has neb as well  In past she was on advair, but ran out and did not get refill  Bipolar: Has had for a while   Has been doing well  Using Seroquel, ativan and prozac  Anxiety: Has had before, and is on Ativan  Right 5th toe: Amputation before  Had bunion first, then had bunion surgery, but devloped infection  Toe then removed  Has asthma  Using albuterol and singulair, but ran out of advair  No results found for: Nguyen Faes, SARSCORONAVI, CORONAVIRUSR, [address]  Past Medical History:   Diagnosis Date    ADD (attention deficit disorder)     Anxiety     Arthritis     Asthma     Bipolar 1 disorder ([facility] [address] )     Cellulitis     Cellulitis of leg     Depression     Difficulty swallowing     Disease of thyroid gland     hypothyroid    Dysphagia     Elevated lipids     Hearing loss     Hiatal hernia     diaphragmatic    Hyperlipidemia     Hypertension     Psychiatric disorder     Bipolar    Schatzki's ring     Sleep apnea     Thyroid disease     Wears eyeglasses      Past Surgical History:   Procedure Laterality Date    AMPUTATION      Right little toe    BARIATRIC SURGERY      gastric sleeve    BUNIONECTOMY      CHOLECYSTECTOMY      ESOPHAGEAL DILATION      FOREARM SURGERY Left     FRACTURE REPAIR WITH METAL AND METAL REPLACED    GASTRIC BYPASS      HYSTERECTOMY  1998    age 34    JOINT REPLACEMENT      knee    KNEE SURGERY Left     [place] COLONOSCOPY FLX DX W/COLLJ SPEC WHEN PFRMD N/A 6/17/2016    Procedure: EGD AND COLONOSCOPY;  Surgeon: Ayah Barrientos MD;  [facility]; Service: Gastroenterology    PA ESOPHAGOGASTRODUODENOSCOPY TRANSORAL DIAGNOSTIC N/A 4/11/2017    Procedure: ESOPHAGOGASTRODUODENOSCOPY WITH BALLOON DILATATION;  Surgeon: Parisa Adam MD;  [facility]: [facility];   Service: Gastroenterology    REPLACEMENT TOTAL KNEE      Left Side    ROTATOR CUFF REPAIR Left     SKIN LESION EXCISION      thighs    SLEEVE GASTROPLASTY      gastric sleeve    TONSILLECTOMY       Current Outpatient Medications   Medication Sig Dispense Refill    albuterol (Melissa Simpler HFA) 90 mcg/act inhaler INHALE 2 PUFFS BY MOUTH EVERY 4 HOURS AS NEEDED FOR WHEEZING OR SHORTNESS OF BREATH 42 5 g 3    amLODIPine (NORVASC) 10 mg tablet TAKE 1 TABLET BY MOUTH  DAILY 90 tablet 1    amphetamine-dextroamphetamine (ADDERALL) 30 MG tablet Take 1 tablet (30 mg total) by mouth 2 (two) times a day Max Daily Amount: 60 mg 60 tablet 0    calcium citrate-Vitamin D (Calcium Citrate + D3) 200 mg-250 units Take by mouth      FLUoxetine (PROzac) 20 mg capsule Take 2 capsules (40 mg total) by mouth daily 180 capsule 1    fluticasone (FLONASE) 50 mcg/act nasal spray 1 spray into each nostril daily 17 g 3    fluticasone-salmeterol (Advair) 100-50 mcg/dose inhaler Inhale 1 puff 2 (two) times a day Rinse mouth after use  60 blister 5    hydrochlorothiazide (HYDRODIURIL) 25 mg tablet Take 1 tablet (25 mg total) by mouth daily as needed (edema) 90 tablet 1    ipratropium-albuterol (DUO-NEB) 0 5-2 5 mg/3 mL nebulizer solution INHALE 1 CONTENTS OF 1 VIAL VIA NEBULIZER EVERY 6 HOURS AS NEEDED FOR WHEEZING OR SHORTNESS OF BREATH 180 mL 1    levothyroxine 100 mcg tablet TAKE 1 TABLET(100 MCG) BY MOUTH DAILY 90 tablet 1    LORazepam (ATIVAN) 2 mg tablet Take 1 tablet (2 mg total) by mouth every 6 (six) hours as needed for anxiety 360 tablet 0    montelukast (SINGULAIR) 10 mg tablet TAKE 1 TABLET BY MOUTH  DAILY 90 tablet 1    omeprazole (PriLOSEC) 20 mg delayed release capsule omeprazole 20 mg capsule,delayed release      Pediatric Multiple Vitamins (Multivitamin Childrens) CHEW Chew      QUEtiapine (SEROquel) 100 mg tablet TAKE 1 TABLET(100 MG) BY MOUTH DAILY AT BEDTIME 90 tablet 1    methylPREDNISolone 4 MG tablet therapy pack Use as directed on package 21 each 0     No current facility-administered medications for this visit  Allergies   Allergen Reactions    Oxycontin [Oxycodone]      Some type of reaction 20 years ago where she had trouble breathing      Vilazodone Hcl     Augmentin [Amoxicillin-Pot Clavulanate] GI Intolerance     Doesn't know    Clarithromycin GI Intolerance    Erythromycin GI Intolerance    Other GI Intolerance     vibryd       Review of Systems   Constitutional: Positive for fatigue and fever  Negative for chills  HENT: Positive for congestion, rhinorrhea and sore throat  Respiratory: Positive for cough and shortness of breath  Negative for chest tightness  Gastrointestinal: Negative for abdominal pain, diarrhea, nausea and vomiting  Musculoskeletal: Positive for myalgias  Neurological: Positive for headaches  Objective:    Vitals:    01/20/22 1314   Weight: 129 kg (284 lb)   Height: 5' 3" (1 6 m)       Physical Exam    VIRTUAL VISIT DISCLAIMER    Rajinder Levine verbally agrees to participate in [employer]  Pt is aware that [employer] could be limited without vital signs or the ability to perform a full hands-on physical exam  Danita LORI Espino [place] understands she or the provider may request at any time to terminate the video visit and request the patient to seek care or treatment in person

## 2022-01-20 NOTE — ASSESSMENT & PLAN NOTE
Unfortunate, the patient was not able to check her blood pressure today  Previously it has been quite good      continues on amlodipine, hydrochlorothiazide

## 2022-01-20 NOTE — PATIENT INSTRUCTIONS
Problem List Items Addressed This Visit     Asthma     Patient currently having an asthma exacerbation  Question cause  Could be COVID, could be infection, could be cold weather  At this point, I do agree that she could use steroids, therefore a Medrol Dosepak  I am trying to keep it at lower dose  reviewed side effects of recurrent use of steroids as well  Recommend restart of Advair  She had run out before, therefore I would restart that     continue albuterol  continue Singulair  Relevant Medications    fluticasone-salmeterol (Advair) 100-50 mcg/dose inhaler    methylPREDNISolone 4 MG tablet therapy pack    Attention deficit disorder (ADD)     Patient to follow with Dr Maria T Osborne  She has been on Adderall  Benign essential hypertension     Unfortunate, the patient was not able to check her blood pressure today  Previously it has been quite good  continues on amlodipine, hydrochlorothiazide         Bipolar I disorder ([facility] )     Patient's history of bipolar  currently on several medications including Prozac and Seroquel  Follow-up in the future  recommend Psychiatry or counseling  History of amputation of lesser toe of right foot ([facility] [address] )     Patient did have amputation previously  Was postop for infection  Morbid obesity ([facility] [address] )     Patient's last BMI was quite a bit elevated  she is planning on having a gastric sleeve redo  Patient needs to have blood work done  Reviewed that with her it specifically  orders are already in the system  These labs were ordered previously by Dr Maria T Osborne, and she had recommended to the patient that she have them done several months ago  Obstructive sleep apnea     Follow in the future at office visit  No change at the moment  Postgastrectomy malabsorption     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 or [facility]          COVID 19 Instructions    Whitley Palomino was advised to limit contact with others to essential tasks such as getting food, medications, and medical care  Proper handwashing reviewed, and Hand sanitzer when washing is not available  If the patient develops symptoms of COVID 19, the patient should call the office as soon as possible  For [id number] Flu season, it is strongly recommended that Flu Vaccinations be obtained  Virtual Visits:  Alysia: This works on smart phones (any phone with Internet browsing capability)  You should get a text message when the provider is ready to see you  Click on the link in the text message, and the call should start  You will need to type in your name, and allow camera and microphone access  This is HIPPA compliant, and secure  If you have not already done so, get immunized to COVID 19  We are committed to getting you vaccinated as soon as possible and will be closely following CDC and [facility]  guidelines as they are released and revised  Please refer to our COVID-19 vaccine webpage for the most up to date information on the vaccine and our distribution efforts  This site will also have the most up to date recommendations for COVID booster vaccine      Charles tn    Call [phone]), option [phone]    OUR NEW LOCATION:    [address], [place], [address]  Fax: [phone]    Lab services and OB/GYN are at this location as well

## 2022-01-20 NOTE — ASSESSMENT & PLAN NOTE
Patient's last BMI was quite a bit elevated  she is planning on having a gastric sleeve redo  Patient needs to have blood work done  Reviewed that with her it specifically  orders are already in the system  These labs were ordered previously by Dr Rodrick Cannon, and she had recommended to the patient that she have them done several months ago

## 2022-01-20 NOTE — ASSESSMENT & PLAN NOTE
Patient does have post gastrectomy malabsorption  Unfortunately, she also is due for labs which she has not done  please see discussion of obesity  Patient was informed previously that she should have blood work done  Reviewed with her again that she does need blood work done

## 2022-01-20 NOTE — ASSESSMENT & PLAN NOTE
Patient currently having an asthma exacerbation  Question cause  Could be COVID, could be infection, could be cold weather  At this point, I do agree that she could use steroids, therefore a Medrol Dosepak  I am trying to keep it at lower dose  reviewed side effects of recurrent use of steroids as well  Recommend restart of Advair  She had run out before, therefore I would restart that     continue albuterol  continue Singulair

## 2022-01-24 DIAGNOSIS — F98.8 ATTENTION DEFICIT DISORDER (ADD) WITHOUT HYPERACTIVITY: ICD-10-CM

## 2022-01-24 RX ORDER — DEXTROAMPHETAMINE SACCHARATE, AMPHETAMINE ASPARTATE, DEXTROAMPHETAMINE SULFATE AND AMPHETAMINE SULFATE 7.5; 7.5; 7.5; 7.5 MG/1; MG/1; MG/1; MG/1
30 TABLET ORAL 2 TIMES DAILY
Qty: 60 TABLET | Refills: 0 | OUTPATIENT
Start: 2022-01-24 | End: 2022-02-23

## 2022-01-25 DIAGNOSIS — J45.41 MODERATE PERSISTENT ASTHMA WITH ACUTE EXACERBATION: ICD-10-CM

## 2022-01-26 DIAGNOSIS — F98.8 ATTENTION DEFICIT DISORDER (ADD) WITHOUT HYPERACTIVITY: ICD-10-CM

## 2022-01-26 RX ORDER — DEXTROAMPHETAMINE SACCHARATE, AMPHETAMINE ASPARTATE, DEXTROAMPHETAMINE SULFATE AND AMPHETAMINE SULFATE 7.5; 7.5; 7.5; 7.5 MG/1; MG/1; MG/1; MG/1
30 TABLET ORAL 2 TIMES DAILY
Qty: 60 TABLET | Refills: 0 | Status: SHIPPED | OUTPATIENT
Start: 2022-01-26 | End: 2022-02-23 | Stop reason: SDUPTHER

## 2022-01-31 DIAGNOSIS — R60.0 LOCALIZED EDEMA: ICD-10-CM

## 2022-01-31 PROBLEM — K22.2 SCHATZKI'S RING OF DISTAL ESOPHAGUS: Status: ACTIVE | Noted: 2021-08-16

## 2022-01-31 RX ORDER — HYDROCHLOROTHIAZIDE 25 MG/1
TABLET ORAL
Qty: 90 TABLET | Refills: 1 | Status: SHIPPED | OUTPATIENT
Start: 2022-01-31

## 2022-02-02 ENCOUNTER — APPOINTMENT (OUTPATIENT)
Dept: LAB | Facility: CLINIC | Age: 62
End: 2022-02-02
Payer: COMMERCIAL

## 2022-02-02 DIAGNOSIS — E66.01 MORBID OBESITY (HCC): ICD-10-CM

## 2022-02-02 DIAGNOSIS — E03.9 HYPOTHYROIDISM, UNSPECIFIED TYPE: ICD-10-CM

## 2022-02-02 DIAGNOSIS — I10 BENIGN ESSENTIAL HYPERTENSION: ICD-10-CM

## 2022-02-02 LAB
ALBUMIN SERPL BCP-MCNC: 3.1 G/DL (ref 3.5–5)
ALP SERPL-CCNC: 101 U/L (ref 46–116)
ALT SERPL W P-5'-P-CCNC: 16 U/L (ref 12–78)
ANION GAP SERPL CALCULATED.3IONS-SCNC: 5 MMOL/L (ref 4–13)
AST SERPL W P-5'-P-CCNC: 16 U/L (ref 5–45)
BASOPHILS # BLD AUTO: 0.06 THOUSANDS/ΜL (ref 0–0.1)
BASOPHILS NFR BLD AUTO: 1 % (ref 0–1)
BILIRUB SERPL-MCNC: 0.68 MG/DL (ref 0.2–1)
BUN SERPL-MCNC: 9 MG/DL (ref 5–25)
CALCIUM ALBUM COR SERPL-MCNC: 10.1 MG/DL (ref 8.3–10.1)
CALCIUM SERPL-MCNC: 9.4 MG/DL (ref 8.3–10.1)
CHLORIDE SERPL-SCNC: 106 MMOL/L (ref 100–108)
CHOLEST SERPL-MCNC: 233 MG/DL
CO2 SERPL-SCNC: 31 MMOL/L (ref 21–32)
CREAT SERPL-MCNC: 0.99 MG/DL (ref 0.6–1.3)
EOSINOPHIL # BLD AUTO: 0.36 THOUSAND/ΜL (ref 0–0.61)
EOSINOPHIL NFR BLD AUTO: 4 % (ref 0–6)
ERYTHROCYTE [DISTWIDTH] IN BLOOD BY AUTOMATED COUNT: 14.7 % (ref 11.6–15.1)
GFR SERPL CREATININE-BSD FRML MDRD: 60 ML/MIN/1.73SQ M
GLUCOSE P FAST SERPL-MCNC: 95 MG/DL (ref 65–99)
HCT VFR BLD AUTO: 44 % (ref 34.8–46.1)
HDLC SERPL-MCNC: 66 MG/DL
HGB BLD-MCNC: 13.3 G/DL (ref 11.5–15.4)
IMM GRANULOCYTES # BLD AUTO: 0.07 THOUSAND/UL (ref 0–0.2)
IMM GRANULOCYTES NFR BLD AUTO: 1 % (ref 0–2)
LDLC SERPL CALC-MCNC: 129 MG/DL (ref 0–100)
LYMPHOCYTES # BLD AUTO: 1.82 THOUSANDS/ΜL (ref 0.6–4.47)
LYMPHOCYTES NFR BLD AUTO: 18 % (ref 14–44)
MCH RBC QN AUTO: 26.6 PG (ref 26.8–34.3)
MCHC RBC AUTO-ENTMCNC: 30.2 G/DL (ref 31.4–37.4)
MCV RBC AUTO: 88 FL (ref 82–98)
MONOCYTES # BLD AUTO: 0.7 THOUSAND/ΜL (ref 0.17–1.22)
MONOCYTES NFR BLD AUTO: 7 % (ref 4–12)
NEUTROPHILS # BLD AUTO: 7.41 THOUSANDS/ΜL (ref 1.85–7.62)
NEUTS SEG NFR BLD AUTO: 69 % (ref 43–75)
NONHDLC SERPL-MCNC: 167 MG/DL
NRBC BLD AUTO-RTO: 0 /100 WBCS
PLATELET # BLD AUTO: 441 THOUSANDS/UL (ref 149–390)
PMV BLD AUTO: 10 FL (ref 8.9–12.7)
POTASSIUM SERPL-SCNC: 3.5 MMOL/L (ref 3.5–5.3)
PROT SERPL-MCNC: 7.7 G/DL (ref 6.4–8.2)
RBC # BLD AUTO: 5 MILLION/UL (ref 3.81–5.12)
SODIUM SERPL-SCNC: 142 MMOL/L (ref 136–145)
TRIGL SERPL-MCNC: 189 MG/DL
TSH SERPL DL<=0.05 MIU/L-ACNC: 0.69 UIU/ML (ref 0.36–3.74)
WBC # BLD AUTO: 10.42 THOUSAND/UL (ref 4.31–10.16)

## 2022-02-02 PROCEDURE — 80053 COMPREHEN METABOLIC PANEL: CPT

## 2022-02-02 PROCEDURE — 36415 COLL VENOUS BLD VENIPUNCTURE: CPT

## 2022-02-02 PROCEDURE — 85025 COMPLETE CBC W/AUTO DIFF WBC: CPT

## 2022-02-02 PROCEDURE — 80061 LIPID PANEL: CPT

## 2022-02-02 PROCEDURE — 84443 ASSAY THYROID STIM HORMONE: CPT

## 2022-02-04 ENCOUNTER — OFFICE VISIT (OUTPATIENT)
Dept: FAMILY MEDICINE CLINIC | Facility: CLINIC | Age: 62
End: 2022-02-04
Payer: COMMERCIAL

## 2022-02-04 VITALS
DIASTOLIC BLOOD PRESSURE: 82 MMHG | BODY MASS INDEX: 49.08 KG/M2 | WEIGHT: 277 LBS | RESPIRATION RATE: 16 BRPM | TEMPERATURE: 97.4 F | HEART RATE: 98 BPM | HEIGHT: 63 IN | SYSTOLIC BLOOD PRESSURE: 128 MMHG

## 2022-02-04 DIAGNOSIS — E03.9 HYPOTHYROIDISM, UNSPECIFIED TYPE: ICD-10-CM

## 2022-02-04 DIAGNOSIS — F31.78 BIPOLAR 1 DISORDER, MIXED, FULL REMISSION (HCC): ICD-10-CM

## 2022-02-04 DIAGNOSIS — E66.01 MORBID OBESITY (HCC): Primary | ICD-10-CM

## 2022-02-04 DIAGNOSIS — F98.8 ATTENTION DEFICIT DISORDER, UNSPECIFIED HYPERACTIVITY PRESENCE: ICD-10-CM

## 2022-02-04 DIAGNOSIS — F41.9 ANXIETY: ICD-10-CM

## 2022-02-04 DIAGNOSIS — E66.01 CLASS 3 SEVERE OBESITY DUE TO EXCESS CALORIES WITH BODY MASS INDEX (BMI) OF 45.0 TO 49.9 IN ADULT, UNSPECIFIED WHETHER SERIOUS COMORBIDITY PRESENT (HCC): ICD-10-CM

## 2022-02-04 DIAGNOSIS — I10 BENIGN ESSENTIAL HYPERTENSION: Primary | ICD-10-CM

## 2022-02-04 DIAGNOSIS — F31.9 BIPOLAR I DISORDER (HCC): ICD-10-CM

## 2022-02-04 PROCEDURE — 99214 OFFICE O/P EST MOD 30 MIN: CPT | Performed by: FAMILY MEDICINE

## 2022-02-04 RX ORDER — LORAZEPAM 2 MG/1
2 TABLET ORAL EVERY 6 HOURS PRN
Qty: 360 TABLET | Refills: 0 | Status: SHIPPED | OUTPATIENT
Start: 2022-02-04 | End: 2022-05-02 | Stop reason: SDUPTHER

## 2022-02-04 NOTE — PROGRESS NOTES
Assessment and Plan:    Problem List Items Addressed This Visit        Endocrine    Hypothyroidism     Await lab            Cardiovascular and Mediastinum    Benign essential hypertension - Primary     BP stable            Other    Anxiety     Stable on meds         Relevant Medications    LORazepam (ATIVAN) 2 mg tablet    Other Relevant Orders    Toxicology screen, urine    Attention deficit disorder (ADD)     Stable on meds         Relevant Medications    LORazepam (ATIVAN) 2 mg tablet    Other Relevant Orders    Toxicology screen, urine    Bipolar I disorder (HCC)     stable         Relevant Medications    LORazepam (ATIVAN) 2 mg tablet      Other Visit Diagnoses     Bipolar 1 disorder, mixed, full remission (HCC)        Relevant Medications    LORazepam (ATIVAN) 2 mg tablet    Class 3 severe obesity due to excess calories with body mass index (BMI) of 45 0 to 49 9 in adult, unspecified whether serious comorbidity present ([facility] )                     Diagnoses and all orders for this visit:    Benign essential hypertension    Anxiety  -     LORazepam (ATIVAN) 2 mg tablet; Take 1 tablet (2 mg total) by mouth every 6 (six) hours as needed for anxiety  -     Toxicology screen, urine    Attention deficit disorder, unspecified hyperactivity presence  -     Toxicology screen, urine    Bipolar 1 disorder, mixed, full remission ([facility] [address] )    Class 3 severe obesity due to excess calories with body mass index (BMI) of 45 0 to 49 9 in adult, unspecified whether serious comorbidity present ([facility] [address] )    Hypothyroidism, unspecified type    Bipolar I disorder ([facility] [address] )              Subjective:      Patient ID: Jesus Cox is a 57 y o  female      CC:    Chief Complaint   Patient presents with    Follow-up     Patient here for follow up       HPI:    F/u add, anxiety, asthma doing well, no headaches, no cp, no sob      The following portions of the patient's history were reviewed and updated as appropriate: allergies, current medications, past family history, past medical history, past social history, past surgical history and problem list       Review of Systems   Constitutional: Negative for activity change, appetite change and fatigue  Respiratory: Negative for shortness of breath  Cardiovascular: Negative for chest pain  Neurological: Negative for dizziness and headaches  Psychiatric/Behavioral: Negative for decreased concentration  The patient is not nervous/anxious  Data to review:       Objective:    Vitals:    02/04/22 1006   BP: 128/82   BP Location: Left arm   Patient Position: Sitting   Cuff Size: Adult   Pulse: 98   Resp: 16   Temp: (!) 97 4 °F (36 3 °C)   TempSrc: Temporal   Weight: 126 kg (277 lb)   Height: 5' 3" (1 6 m)        Physical Exam  Vitals reviewed  Constitutional:       Appearance: Normal appearance  She is obese  Cardiovascular:      Rate and Rhythm: Normal rate and regular rhythm  Pulses: Normal pulses  Heart sounds: Normal heart sounds  Pulmonary:      Effort: Pulmonary effort is normal       Breath sounds: Normal breath sounds  Musculoskeletal:      Right lower leg: No edema  Left lower leg: No edema  Lymphadenopathy:      Cervical: No cervical adenopathy  Neurological:      Mental Status: She is alert     Psychiatric:         Mood and Affect: Mood normal

## 2022-02-23 DIAGNOSIS — F98.8 ATTENTION DEFICIT DISORDER (ADD) WITHOUT HYPERACTIVITY: ICD-10-CM

## 2022-02-23 RX ORDER — DEXTROAMPHETAMINE SACCHARATE, AMPHETAMINE ASPARTATE, DEXTROAMPHETAMINE SULFATE AND AMPHETAMINE SULFATE 7.5; 7.5; 7.5; 7.5 MG/1; MG/1; MG/1; MG/1
30 TABLET ORAL 2 TIMES DAILY
Qty: 60 TABLET | Refills: 0 | Status: SHIPPED | OUTPATIENT
Start: 2022-02-23 | End: 2022-03-25 | Stop reason: SDUPTHER

## 2022-03-12 DIAGNOSIS — F32.A DEPRESSION, UNSPECIFIED DEPRESSION TYPE: ICD-10-CM

## 2022-03-14 RX ORDER — FLUOXETINE HYDROCHLORIDE 20 MG/1
CAPSULE ORAL
Qty: 180 CAPSULE | Refills: 1 | Status: SHIPPED | OUTPATIENT
Start: 2022-03-14

## 2022-03-14 NOTE — TELEPHONE ENCOUNTER
Requested Prescriptions     Pending Prescriptions Disp Refills    FLUoxetine (PROzac) 20 mg capsule [Pharmacy Med Name: FLUOXETINE 20MG CAPSULES] 180 capsule 1     Sig: TAKE 2 CAPSULES(40 MG) BY MOUTH DAILY     LOV 2/4/22, F/U 5/6/22, labs pending

## 2022-03-19 ENCOUNTER — APPOINTMENT (OUTPATIENT)
Dept: LAB | Facility: CLINIC | Age: 62
End: 2022-03-19
Payer: COMMERCIAL

## 2022-03-19 DIAGNOSIS — E66.01 MORBID OBESITY (HCC): ICD-10-CM

## 2022-03-19 DIAGNOSIS — R60.0 LOCALIZED EDEMA: ICD-10-CM

## 2022-03-19 DIAGNOSIS — R82.81 PYURIA: ICD-10-CM

## 2022-03-19 LAB — INSULIN SERPL-ACNC: 13.3 MU/L (ref 3–25)

## 2022-03-19 PROCEDURE — 36415 COLL VENOUS BLD VENIPUNCTURE: CPT

## 2022-03-19 PROCEDURE — 83525 ASSAY OF INSULIN: CPT

## 2022-03-19 PROCEDURE — 80307 DRUG TEST PRSMV CHEM ANLYZR: CPT | Performed by: FAMILY MEDICINE

## 2022-03-25 DIAGNOSIS — F98.8 ATTENTION DEFICIT DISORDER (ADD) WITHOUT HYPERACTIVITY: ICD-10-CM

## 2022-03-25 RX ORDER — DEXTROAMPHETAMINE SACCHARATE, AMPHETAMINE ASPARTATE, DEXTROAMPHETAMINE SULFATE AND AMPHETAMINE SULFATE 7.5; 7.5; 7.5; 7.5 MG/1; MG/1; MG/1; MG/1
30 TABLET ORAL 2 TIMES DAILY
Qty: 60 TABLET | Refills: 0 | Status: SHIPPED | OUTPATIENT
Start: 2022-03-25 | End: 2022-04-25 | Stop reason: SDUPTHER

## 2022-03-31 ENCOUNTER — TELEPHONE (OUTPATIENT)
Dept: FAMILY MEDICINE CLINIC | Facility: CLINIC | Age: 62
End: 2022-03-31

## 2022-03-31 NOTE — TELEPHONE ENCOUNTER
----- Message from Abby Santiago MD sent at 3/30/2022  8:01 AM EDT -----  Urine c/w adderall but no lorazepam, needs urine specifically for lorazepam

## 2022-04-23 DIAGNOSIS — E03.9 HYPOTHYROIDISM, UNSPECIFIED TYPE: ICD-10-CM

## 2022-04-23 DIAGNOSIS — I10 BENIGN ESSENTIAL HYPERTENSION: ICD-10-CM

## 2022-04-23 DIAGNOSIS — F98.8 ATTENTION DEFICIT DISORDER (ADD) WITHOUT HYPERACTIVITY: ICD-10-CM

## 2022-04-25 RX ORDER — DEXTROAMPHETAMINE SACCHARATE, AMPHETAMINE ASPARTATE, DEXTROAMPHETAMINE SULFATE AND AMPHETAMINE SULFATE 7.5; 7.5; 7.5; 7.5 MG/1; MG/1; MG/1; MG/1
30 TABLET ORAL 2 TIMES DAILY
Qty: 60 TABLET | Refills: 0 | Status: SHIPPED | OUTPATIENT
Start: 2022-04-25 | End: 2022-05-24 | Stop reason: SDUPTHER

## 2022-04-25 RX ORDER — AMLODIPINE BESYLATE 10 MG/1
TABLET ORAL
Qty: 90 TABLET | Refills: 1 | Status: SHIPPED | OUTPATIENT
Start: 2022-04-25

## 2022-04-25 RX ORDER — LEVOTHYROXINE SODIUM 0.1 MG/1
TABLET ORAL
Qty: 90 TABLET | Refills: 1 | Status: SHIPPED | OUTPATIENT
Start: 2022-04-25 | End: 2022-06-01 | Stop reason: SDUPTHER

## 2022-04-28 DIAGNOSIS — J45.30 MILD PERSISTENT ASTHMA WITHOUT COMPLICATION: ICD-10-CM

## 2022-04-28 RX ORDER — IPRATROPIUM BROMIDE AND ALBUTEROL SULFATE 2.5; .5 MG/3ML; MG/3ML
3 SOLUTION RESPIRATORY (INHALATION) EVERY 8 HOURS PRN
Qty: 180 ML | Refills: 1 | Status: SHIPPED | OUTPATIENT
Start: 2022-04-28 | End: 2022-06-01 | Stop reason: SDUPTHER

## 2022-04-28 NOTE — TELEPHONE ENCOUNTER
Araslei Eason @ [facility] [place] aware as per MF, Duo -Neb can be filled

## 2022-04-28 NOTE — TELEPHONE ENCOUNTER
Rodolfo Oconnor @ [address] [place] [address] in [place] states that re: pts Rx for Duo-Neb it is coming up with contraindications if pt has A-Fib, Htn or HX of Epilepsy  May the pharmacy still fill?

## 2022-05-02 DIAGNOSIS — F31.78 BIPOLAR 1 DISORDER, MIXED, FULL REMISSION (HCC): ICD-10-CM

## 2022-05-02 DIAGNOSIS — F41.9 ANXIETY: ICD-10-CM

## 2022-05-02 RX ORDER — LORAZEPAM 2 MG/1
2 TABLET ORAL EVERY 6 HOURS PRN
Qty: 360 TABLET | Refills: 0 | Status: SHIPPED | OUTPATIENT
Start: 2022-05-02

## 2022-05-02 RX ORDER — QUETIAPINE FUMARATE 100 MG/1
100 TABLET, FILM COATED ORAL
Qty: 90 TABLET | Refills: 1 | Status: SHIPPED | OUTPATIENT
Start: 2022-05-02

## 2022-05-19 ENCOUNTER — TELEPHONE (OUTPATIENT)
Dept: FAMILY MEDICINE CLINIC | Facility: CLINIC | Age: 62
End: 2022-05-19

## 2022-05-24 DIAGNOSIS — F98.8 ATTENTION DEFICIT DISORDER (ADD) WITHOUT HYPERACTIVITY: ICD-10-CM

## 2022-05-24 RX ORDER — DEXTROAMPHETAMINE SACCHARATE, AMPHETAMINE ASPARTATE, DEXTROAMPHETAMINE SULFATE AND AMPHETAMINE SULFATE 7.5; 7.5; 7.5; 7.5 MG/1; MG/1; MG/1; MG/1
30 TABLET ORAL 2 TIMES DAILY
Qty: 60 TABLET | Refills: 0 | Status: SHIPPED | OUTPATIENT
Start: 2022-05-24 | End: 2022-06-24 | Stop reason: SDUPTHER

## 2022-06-01 ENCOUNTER — OFFICE VISIT (OUTPATIENT)
Dept: FAMILY MEDICINE CLINIC | Facility: CLINIC | Age: 62
End: 2022-06-01
Payer: COMMERCIAL

## 2022-06-01 VITALS
HEIGHT: 63 IN | WEIGHT: 262 LBS | OXYGEN SATURATION: 96 % | BODY MASS INDEX: 46.42 KG/M2 | HEART RATE: 113 BPM | SYSTOLIC BLOOD PRESSURE: 124 MMHG | DIASTOLIC BLOOD PRESSURE: 76 MMHG | TEMPERATURE: 96.1 F

## 2022-06-01 DIAGNOSIS — F41.9 ANXIETY: ICD-10-CM

## 2022-06-01 DIAGNOSIS — Z51.81 ENCOUNTER FOR MEDICATION MONITORING: ICD-10-CM

## 2022-06-01 DIAGNOSIS — R13.10 DYSPHAGIA, UNSPECIFIED TYPE: ICD-10-CM

## 2022-06-01 DIAGNOSIS — F98.8 ATTENTION DEFICIT DISORDER, UNSPECIFIED HYPERACTIVITY PRESENCE: ICD-10-CM

## 2022-06-01 DIAGNOSIS — F31.9 BIPOLAR I DISORDER (HCC): ICD-10-CM

## 2022-06-01 DIAGNOSIS — J45.30 MILD PERSISTENT ASTHMA WITHOUT COMPLICATION: ICD-10-CM

## 2022-06-01 DIAGNOSIS — E03.9 HYPOTHYROIDISM, UNSPECIFIED TYPE: ICD-10-CM

## 2022-06-01 DIAGNOSIS — J45.40 MODERATE PERSISTENT ASTHMA WITHOUT COMPLICATION: ICD-10-CM

## 2022-06-01 DIAGNOSIS — F41.9 ANXIETY: Primary | ICD-10-CM

## 2022-06-01 DIAGNOSIS — I10 BENIGN ESSENTIAL HYPERTENSION: ICD-10-CM

## 2022-06-01 DIAGNOSIS — L65.9 HAIR LOSS: Primary | ICD-10-CM

## 2022-06-01 PROCEDURE — 99214 OFFICE O/P EST MOD 30 MIN: CPT | Performed by: FAMILY MEDICINE

## 2022-06-01 PROCEDURE — 80307 DRUG TEST PRSMV CHEM ANLYZR: CPT | Performed by: FAMILY MEDICINE

## 2022-06-01 RX ORDER — OMEPRAZOLE 20 MG/1
20 CAPSULE, DELAYED RELEASE ORAL DAILY
Qty: 90 CAPSULE | Refills: 1 | Status: SHIPPED | OUTPATIENT
Start: 2022-06-01

## 2022-06-01 RX ORDER — IPRATROPIUM BROMIDE AND ALBUTEROL SULFATE 2.5; .5 MG/3ML; MG/3ML
3 SOLUTION RESPIRATORY (INHALATION) EVERY 8 HOURS PRN
Qty: 180 ML | Refills: 1 | Status: SHIPPED | OUTPATIENT
Start: 2022-06-01

## 2022-06-01 RX ORDER — LEVOTHYROXINE SODIUM 0.1 MG/1
100 TABLET ORAL DAILY
Qty: 90 TABLET | Refills: 1 | Status: SHIPPED | OUTPATIENT
Start: 2022-06-01

## 2022-06-01 NOTE — PROGRESS NOTES
Assessment and Plan:    Problem List Items Addressed This Visit        Digestive    Dysphagia    Relevant Medications    omeprazole (PriLOSEC) 20 mg delayed release capsule       Endocrine    Hypothyroidism     Having hair loss, last TSH nl, will recheck           Relevant Medications    levothyroxine 100 mcg tablet    Other Relevant Orders    TSH, 3rd generation       Respiratory    Moderate persistent asthma without complication     Stable on meds           Relevant Medications    ipratropium-albuterol (DUO-NEB) 0 5-2 5 mg/3 mL nebulizer solution       Cardiovascular and Mediastinum    Benign essential hypertension     BP stable              Other    Anxiety     Needs serum Lorazepam, do rec  1/2 tab for 2 of the doses, contract signed           Attention deficit disorder (ADD)     Stable on meds,contract signed           Bipolar I disorder ([facility] [address] )     Stable on meds             Other Visit Diagnoses     Hair loss    -  Primary    Relevant Orders    TSH, 3rd generation    CBC and differential    Comprehensive metabolic panel    Mild persistent asthma without complication        Relevant Medications    ipratropium-albuterol (DUO-NEB) 0 5-2 5 mg/3 mL nebulizer solution                 Diagnoses and all orders for this visit:    Hair loss  -     TSH, 3rd generation; Future  -     CBC and differential; Future  -     Comprehensive metabolic panel; Future    Hypothyroidism, unspecified type  -     levothyroxine 100 mcg tablet; Take 1 tablet (100 mcg total) by mouth daily  -     TSH, 3rd generation; Future    Dysphagia, unspecified type  -     omeprazole (PriLOSEC) 20 mg delayed release capsule;  Take 1 capsule (20 mg total) by mouth daily    Moderate persistent asthma without complication    Benign essential hypertension    Anxiety    Attention deficit disorder, unspecified hyperactivity presence    Bipolar I disorder (HCC)    Mild persistent asthma without complication  -     ipratropium-albuterol (DUO-NEB) 0 5-2 5 mg/3 mL nebulizer solution; Take 3 mL by nebulization every 8 (eight) hours as needed for wheezing or shortness of breath            Subjective:      Patient ID: Bambi Ricks is a 57 y o  female  CC:    Chief Complaint   Patient presents with    Follow-up     For chronic conditions  Pt states she is having a lot more hair loss  mgb       HPI:    F/u anxiety, add, htn, feels fairly well, no cp, no sob, is losing hair,  Last TSH nl, is sweating more with heat, agreeable to try taking 1/2 Lorazepam for 3  Of doses      The following portions of the patient's history were reviewed and updated as appropriate: allergies, current medications, past family history, past medical history, past social history, past surgical history and problem list       Review of Systems   Constitutional: Positive for diaphoresis and unexpected weight change  Negative for activity change, appetite change and fatigue  15 lb loss   Musculoskeletal: Negative for arthralgias and back pain  Skin:        Hair loss   Neurological: Negative for dizziness and headaches  Psychiatric/Behavioral: Positive for sleep disturbance  The patient is nervous/anxious  Data to review:       Objective:    Vitals:    06/01/22 1507   BP: 124/76   BP Location: Right arm   Patient Position: Sitting   Cuff Size: Large   Pulse: (!) 113   Temp: (!) 96 1 °F (35 6 °C)   TempSrc: Temporal   SpO2: 96%   Weight: 119 kg (262 lb)   Height: 5' 3" (1 6 m)        Physical Exam  Vitals reviewed  Constitutional:       Appearance: Normal appearance  She is obese  She is diaphoretic  HENT:      Nose: No congestion or rhinorrhea  Cardiovascular:      Rate and Rhythm: Normal rate and regular rhythm  Pulses: Normal pulses  Heart sounds: Normal heart sounds  Pulmonary:      Effort: Pulmonary effort is normal       Breath sounds: Normal breath sounds  Musculoskeletal:      Right lower leg: No edema  Left lower leg: No edema     Lymphadenopathy: Cervical: No cervical adenopathy  Neurological:      Mental Status: She is alert     Psychiatric:         Mood and Affect: Mood normal

## 2022-06-08 ENCOUNTER — TELEPHONE (OUTPATIENT)
Dept: FAMILY MEDICINE CLINIC | Facility: CLINIC | Age: 62
End: 2022-06-08

## 2022-06-08 NOTE — TELEPHONE ENCOUNTER
----- Message from Omaira Shin MD sent at 6/7/2022  4:58 PM EDT -----  Has order in system for Lorazepam blood test as well as other lab-MUST do lab BEFORE gets any further controlled substances

## 2022-06-21 DIAGNOSIS — J06.9 UPPER RESPIRATORY TRACT INFECTION, UNSPECIFIED TYPE: ICD-10-CM

## 2022-06-21 RX ORDER — FLUTICASONE PROPIONATE 50 MCG
SPRAY, SUSPENSION (ML) NASAL
Qty: 16 G | Refills: 1 | Status: SHIPPED | OUTPATIENT
Start: 2022-06-21

## 2022-06-21 NOTE — TELEPHONE ENCOUNTER
Requested Prescriptions     Pending Prescriptions Disp Refills    fluticasone (FLONASE) 50 mcg/act nasal spray [Pharmacy Med Name: FLUTICASONE 50MCG NASAL SP (120) RX] 16 g      Sig: SHAKE LIQUID AND USE 1 SPRAY IN EACH NOSTRIL DAILY     LOV 6/1/22, F/U 10/7/22, Labs active

## 2022-06-24 ENCOUNTER — TELEPHONE (OUTPATIENT)
Dept: PSYCHIATRY | Facility: CLINIC | Age: 62
End: 2022-06-24

## 2022-06-24 ENCOUNTER — TELEPHONE (OUTPATIENT)
Dept: FAMILY MEDICINE CLINIC | Facility: CLINIC | Age: 62
End: 2022-06-24

## 2022-06-24 DIAGNOSIS — F98.8 ATTENTION DEFICIT DISORDER (ADD) WITHOUT HYPERACTIVITY: ICD-10-CM

## 2022-06-24 RX ORDER — DEXTROAMPHETAMINE SACCHARATE, AMPHETAMINE ASPARTATE, DEXTROAMPHETAMINE SULFATE AND AMPHETAMINE SULFATE 7.5; 7.5; 7.5; 7.5 MG/1; MG/1; MG/1; MG/1
30 TABLET ORAL 2 TIMES DAILY
Qty: 60 TABLET | Refills: 0 | Status: SHIPPED | OUTPATIENT
Start: 2022-06-24 | End: 2022-07-22 | Stop reason: SDUPTHER

## 2022-06-24 NOTE — TELEPHONE ENCOUNTER
Patient significant other called stated patient is requesting a refill on her adderal please call Helder Torres

## 2022-07-22 DIAGNOSIS — F98.8 ATTENTION DEFICIT DISORDER (ADD) WITHOUT HYPERACTIVITY: ICD-10-CM

## 2022-07-22 RX ORDER — DEXTROAMPHETAMINE SACCHARATE, AMPHETAMINE ASPARTATE, DEXTROAMPHETAMINE SULFATE AND AMPHETAMINE SULFATE 7.5; 7.5; 7.5; 7.5 MG/1; MG/1; MG/1; MG/1
30 TABLET ORAL 2 TIMES DAILY
Qty: 60 TABLET | Refills: 0 | Status: SHIPPED | OUTPATIENT
Start: 2022-07-22 | End: 2022-08-21

## 2022-09-12 DIAGNOSIS — J45.20 MILD INTERMITTENT ASTHMA WITHOUT COMPLICATION: ICD-10-CM

## 2022-09-13 RX ORDER — ALBUTEROL SULFATE 90 UG/1
AEROSOL, METERED RESPIRATORY (INHALATION)
Qty: 42.5 G | Refills: 3 | Status: SHIPPED | OUTPATIENT
Start: 2022-09-13

## 2023-01-18 ENCOUNTER — TELEPHONE (OUTPATIENT)
Dept: FAMILY MEDICINE CLINIC | Facility: CLINIC | Age: 63
End: 2023-01-18

## 2023-05-16 ENCOUNTER — TELEPHONE (OUTPATIENT)
Dept: FAMILY MEDICINE CLINIC | Facility: CLINIC | Age: 63
End: 2023-05-16

## 2023-05-16 NOTE — TELEPHONE ENCOUNTER
On 7/18/2022 Yong Lion [address], [place] [facility]   [phone] Heath Gorman   [phone] (Fax)ent established care with

## 2023-05-31 NOTE — TELEPHONE ENCOUNTER
05/30/23 9:07 PM        The office's request has been received, reviewed, and the patient chart updated  The PCP has successfully been removed with a patient attribution note  This message will now be completed          Thank you  Sadia Flores
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
## 2024-04-15 ENCOUNTER — RA CDI HCC (OUTPATIENT)
Dept: OTHER | Facility: HOSPITAL | Age: 64
End: 2024-04-15

## 2024-04-26 ENCOUNTER — TELEPHONE (OUTPATIENT)
Dept: PSYCHIATRY | Facility: CLINIC | Age: 64
End: 2024-04-26

## 2024-04-26 NOTE — TELEPHONE ENCOUNTER
Patient has been added to the Medication Management wait list without a referral.    Insurance: Medicare/Aarp  Insurance Type:    Commercial []   Medicaid []   County (if applicable)   Medicare [x]  Location Preference: [place]  Provider Preference: male or female  Virtual: Yes [x] No []  Were outside resources sent: Yes [x] No []

## 2024-05-23 ENCOUNTER — TELEPHONE (OUTPATIENT)
Dept: ADMINISTRATIVE | Facility: OTHER | Age: 64
End: 2024-05-23

## 2024-05-23 NOTE — TELEPHONE ENCOUNTER
----- Message from Mike PICHARDO sent at 5/22/2024  4:09 PM EDT -----  Regarding: care gap request  05/22/24 4:09 PM    Hello, our patient attached above has had Mammogram completed/performed. Please assist in updating the patient chart by pulling the Care Everywhere (CE) document. The date of service is 1/8/24.     Thank you,  Mike Partida [facility]

## 2024-05-23 NOTE — TELEPHONE ENCOUNTER
Upon review of the In Basket request we were able to note that no further action is required. The patient chart is up to date as a result of a previous request.      Any additional questions or concerns should be emailed to the Practice Liaisons via the appropriate education email address, please do not reply via In Basket.    Thank you  FERNANDA BEVERLY

## 2024-10-03 ENCOUNTER — TELEPHONE (OUTPATIENT)
Age: 64
End: 2024-10-03

## 2025-01-09 ENCOUNTER — TELEPHONE (OUTPATIENT)
Age: 65
End: 2025-01-09

## 2025-01-09 NOTE — TELEPHONE ENCOUNTER
Contacted patient off of Medication Management  wait list to verify needs of services in attempts to offer appt. LVM for patient to contact intake dept  in regards to  scheduling.     Attempt #1

## 2025-01-13 NOTE — TELEPHONE ENCOUNTER
Contacted patient off of Medication Management  wait list to verify needs of services in attempts to offer appt. LVM for patient to contact intake dept  in regards to scheduling.    Attempt #  Pt removed from wait list.

## (undated) DEVICE — ESOPHAGEAL/PYLORIC/COLONIC/BILIARY WIREGUIDED BALLOON DILATATION CATHETER: Brand: CRE™ PRO